# Patient Record
Sex: FEMALE | Race: WHITE | URBAN - METROPOLITAN AREA
[De-identification: names, ages, dates, MRNs, and addresses within clinical notes are randomized per-mention and may not be internally consistent; named-entity substitution may affect disease eponyms.]

---

## 2020-01-06 ENCOUNTER — INPATIENT (INPATIENT)
Facility: HOSPITAL | Age: 85
LOS: 1 days | Discharge: HOME | DRG: 864 | End: 2020-01-08
Attending: INTERNAL MEDICINE | Admitting: HOSPITALIST
Payer: MEDICARE

## 2020-01-06 VITALS
RESPIRATION RATE: 16 BRPM | HEART RATE: 98 BPM | OXYGEN SATURATION: 100 % | WEIGHT: 119.93 LBS | DIASTOLIC BLOOD PRESSURE: 54 MMHG | TEMPERATURE: 99 F | SYSTOLIC BLOOD PRESSURE: 139 MMHG | HEIGHT: 60 IN

## 2020-01-06 DIAGNOSIS — Z98.890 OTHER SPECIFIED POSTPROCEDURAL STATES: Chronic | ICD-10-CM

## 2020-01-06 DIAGNOSIS — R50.9 FEVER, UNSPECIFIED: ICD-10-CM

## 2020-01-06 LAB
ALBUMIN SERPL ELPH-MCNC: 3.6 G/DL — SIGNIFICANT CHANGE UP (ref 3.3–5)
ALP SERPL-CCNC: 43 U/L — SIGNIFICANT CHANGE UP (ref 40–120)
ALT FLD-CCNC: 26 U/L — SIGNIFICANT CHANGE UP (ref 12–78)
ANION GAP SERPL CALC-SCNC: 5 MMOL/L — SIGNIFICANT CHANGE UP (ref 5–17)
APTT BLD: 33.9 SEC — SIGNIFICANT CHANGE UP (ref 27.5–36.3)
AST SERPL-CCNC: 18 U/L — SIGNIFICANT CHANGE UP (ref 15–37)
BASOPHILS # BLD AUTO: 0.09 K/UL — SIGNIFICANT CHANGE UP (ref 0–0.2)
BASOPHILS NFR BLD AUTO: 2 % — SIGNIFICANT CHANGE UP (ref 0–2)
BILIRUB SERPL-MCNC: 0.2 MG/DL — SIGNIFICANT CHANGE UP (ref 0.2–1.2)
BUN SERPL-MCNC: 20 MG/DL — SIGNIFICANT CHANGE UP (ref 7–23)
CALCIUM SERPL-MCNC: 9 MG/DL — SIGNIFICANT CHANGE UP (ref 8.5–10.1)
CHLORIDE SERPL-SCNC: 109 MMOL/L — HIGH (ref 96–108)
CO2 SERPL-SCNC: 26 MMOL/L — SIGNIFICANT CHANGE UP (ref 22–31)
CREAT SERPL-MCNC: 0.67 MG/DL — SIGNIFICANT CHANGE UP (ref 0.5–1.3)
EOSINOPHIL # BLD AUTO: 0.04 K/UL — SIGNIFICANT CHANGE UP (ref 0–0.5)
EOSINOPHIL NFR BLD AUTO: 1 % — SIGNIFICANT CHANGE UP (ref 0–6)
GLUCOSE SERPL-MCNC: 129 MG/DL — HIGH (ref 70–99)
HCT VFR BLD CALC: 26.3 % — LOW (ref 34.5–45)
HGB BLD-MCNC: 9 G/DL — LOW (ref 11.5–15.5)
INR BLD: 1.15 RATIO — SIGNIFICANT CHANGE UP (ref 0.88–1.16)
LACTATE SERPL-SCNC: 2.2 MMOL/L — HIGH (ref 0.7–2)
LYMPHOCYTES # BLD AUTO: 0.74 K/UL — LOW (ref 1–3.3)
LYMPHOCYTES # BLD AUTO: 17 % — SIGNIFICANT CHANGE UP (ref 13–44)
MCHC RBC-ENTMCNC: 34.2 GM/DL — SIGNIFICANT CHANGE UP (ref 32–36)
MCHC RBC-ENTMCNC: 37.3 PG — HIGH (ref 27–34)
MCV RBC AUTO: 109.1 FL — HIGH (ref 80–100)
MONOCYTES # BLD AUTO: 0.43 K/UL — SIGNIFICANT CHANGE UP (ref 0–0.9)
MONOCYTES NFR BLD AUTO: 10 % — SIGNIFICANT CHANGE UP (ref 2–14)
NEUTROPHILS # BLD AUTO: 2.81 K/UL — SIGNIFICANT CHANGE UP (ref 1.8–7.4)
NEUTROPHILS NFR BLD AUTO: 64 % — SIGNIFICANT CHANGE UP (ref 43–77)
NRBC # BLD: SIGNIFICANT CHANGE UP /100 WBCS (ref 0–0)
PLATELET # BLD AUTO: 465 K/UL — HIGH (ref 150–400)
POTASSIUM SERPL-MCNC: 3.7 MMOL/L — SIGNIFICANT CHANGE UP (ref 3.5–5.3)
POTASSIUM SERPL-SCNC: 3.7 MMOL/L — SIGNIFICANT CHANGE UP (ref 3.5–5.3)
PROT SERPL-MCNC: 7.2 GM/DL — SIGNIFICANT CHANGE UP (ref 6–8.3)
PROTHROM AB SERPL-ACNC: 12.8 SEC — SIGNIFICANT CHANGE UP (ref 10–12.9)
RAPID RVP RESULT: SIGNIFICANT CHANGE UP
RBC # BLD: 2.41 M/UL — LOW (ref 3.8–5.2)
RBC # FLD: 14.4 % — SIGNIFICANT CHANGE UP (ref 10.3–14.5)
SODIUM SERPL-SCNC: 140 MMOL/L — SIGNIFICANT CHANGE UP (ref 135–145)
WBC # BLD: 4.33 K/UL — SIGNIFICANT CHANGE UP (ref 3.8–10.5)
WBC # FLD AUTO: 4.33 K/UL — SIGNIFICANT CHANGE UP (ref 3.8–10.5)

## 2020-01-06 PROCEDURE — 83036 HEMOGLOBIN GLYCOSYLATED A1C: CPT

## 2020-01-06 PROCEDURE — 83605 ASSAY OF LACTIC ACID: CPT

## 2020-01-06 PROCEDURE — 81003 URINALYSIS AUTO W/O SCOPE: CPT

## 2020-01-06 PROCEDURE — 85018 HEMOGLOBIN: CPT

## 2020-01-06 PROCEDURE — 84100 ASSAY OF PHOSPHORUS: CPT

## 2020-01-06 PROCEDURE — 71045 X-RAY EXAM CHEST 1 VIEW: CPT | Mod: 26

## 2020-01-06 PROCEDURE — 93010 ELECTROCARDIOGRAM REPORT: CPT

## 2020-01-06 PROCEDURE — G0008: CPT

## 2020-01-06 PROCEDURE — 87086 URINE CULTURE/COLONY COUNT: CPT

## 2020-01-06 PROCEDURE — 86900 BLOOD TYPING SEROLOGIC ABO: CPT

## 2020-01-06 PROCEDURE — 80048 BASIC METABOLIC PNL TOTAL CA: CPT

## 2020-01-06 PROCEDURE — 36430 TRANSFUSION BLD/BLD COMPNT: CPT

## 2020-01-06 PROCEDURE — 86920 COMPATIBILITY TEST SPIN: CPT

## 2020-01-06 PROCEDURE — 83735 ASSAY OF MAGNESIUM: CPT

## 2020-01-06 PROCEDURE — 36415 COLL VENOUS BLD VENIPUNCTURE: CPT

## 2020-01-06 PROCEDURE — P9016: CPT

## 2020-01-06 PROCEDURE — 86901 BLOOD TYPING SEROLOGIC RH(D): CPT

## 2020-01-06 PROCEDURE — 85014 HEMATOCRIT: CPT

## 2020-01-06 PROCEDURE — 85027 COMPLETE CBC AUTOMATED: CPT

## 2020-01-06 PROCEDURE — 85025 COMPLETE CBC W/AUTO DIFF WBC: CPT

## 2020-01-06 PROCEDURE — 86850 RBC ANTIBODY SCREEN: CPT

## 2020-01-06 PROCEDURE — 90686 IIV4 VACC NO PRSV 0.5 ML IM: CPT

## 2020-01-06 RX ORDER — SODIUM CHLORIDE 9 MG/ML
1700 INJECTION, SOLUTION INTRAVENOUS ONCE
Refills: 0 | Status: COMPLETED | OUTPATIENT
Start: 2020-01-06 | End: 2020-01-06

## 2020-01-06 RX ORDER — ACETAMINOPHEN 500 MG
650 TABLET ORAL EVERY 6 HOURS
Refills: 0 | Status: DISCONTINUED | OUTPATIENT
Start: 2020-01-06 | End: 2020-01-08

## 2020-01-06 RX ORDER — ASPIRIN/CALCIUM CARB/MAGNESIUM 324 MG
81 TABLET ORAL DAILY
Refills: 0 | Status: DISCONTINUED | OUTPATIENT
Start: 2020-01-06 | End: 2020-01-08

## 2020-01-06 RX ORDER — ATORVASTATIN CALCIUM 80 MG/1
80 TABLET, FILM COATED ORAL AT BEDTIME
Refills: 0 | Status: DISCONTINUED | OUTPATIENT
Start: 2020-01-06 | End: 2020-01-08

## 2020-01-06 RX ORDER — VANCOMYCIN HCL 1 G
1000 VIAL (EA) INTRAVENOUS ONCE
Refills: 0 | Status: COMPLETED | OUTPATIENT
Start: 2020-01-06 | End: 2020-01-06

## 2020-01-06 RX ORDER — PIPERACILLIN AND TAZOBACTAM 4; .5 G/20ML; G/20ML
3.38 INJECTION, POWDER, LYOPHILIZED, FOR SOLUTION INTRAVENOUS ONCE
Refills: 0 | Status: COMPLETED | OUTPATIENT
Start: 2020-01-06 | End: 2020-01-06

## 2020-01-06 RX ORDER — LEVOTHYROXINE SODIUM 125 MCG
50 TABLET ORAL DAILY
Refills: 0 | Status: DISCONTINUED | OUTPATIENT
Start: 2020-01-06 | End: 2020-01-08

## 2020-01-06 RX ORDER — LOSARTAN POTASSIUM 100 MG/1
50 TABLET, FILM COATED ORAL DAILY
Refills: 0 | Status: DISCONTINUED | OUTPATIENT
Start: 2020-01-06 | End: 2020-01-08

## 2020-01-06 RX ORDER — SERTRALINE 25 MG/1
100 TABLET, FILM COATED ORAL DAILY
Refills: 0 | Status: DISCONTINUED | OUTPATIENT
Start: 2020-01-06 | End: 2020-01-08

## 2020-01-06 RX ORDER — SODIUM CHLORIDE 9 MG/ML
1000 INJECTION, SOLUTION INTRAVENOUS ONCE
Refills: 0 | Status: COMPLETED | OUTPATIENT
Start: 2020-01-06 | End: 2020-01-06

## 2020-01-06 RX ADMIN — PIPERACILLIN AND TAZOBACTAM 3.38 GRAM(S): 4; .5 INJECTION, POWDER, LYOPHILIZED, FOR SOLUTION INTRAVENOUS at 18:40

## 2020-01-06 RX ADMIN — SODIUM CHLORIDE 1000 MILLILITER(S): 9 INJECTION, SOLUTION INTRAVENOUS at 22:32

## 2020-01-06 RX ADMIN — SODIUM CHLORIDE 1700 MILLILITER(S): 9 INJECTION, SOLUTION INTRAVENOUS at 18:08

## 2020-01-06 RX ADMIN — PIPERACILLIN AND TAZOBACTAM 200 GRAM(S): 4; .5 INJECTION, POWDER, LYOPHILIZED, FOR SOLUTION INTRAVENOUS at 18:08

## 2020-01-06 RX ADMIN — Medication 650 MILLIGRAM(S): at 22:33

## 2020-01-06 RX ADMIN — Medication 250 MILLIGRAM(S): at 22:32

## 2020-01-06 NOTE — H&P ADULT - ASSESSMENT
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   -Vitamin B12 and Folate     #Thrombocytosis   -?reactive vs secondary to hematological malignancy   -likely reactive/acute phase reactant given fever/sepsis   -continue to trend     #Hyperglycemia   -No Hx of DM  -HbA1c in AM   -will start on ISS based on results     #DVT Prophylaxis   Heparin SubQ    IMPROVE VTE Individual Risk Assessment    RISK                                                                Points    [  ] Previous VTE                                                  3    [  ] Thrombophilia                                               2    [  ] Lower limb paralysis                                      2        (unable to hold up >15 seconds)      [ x ] Current Cancer                                              2         (within 6 months)    [  ] Immobilization > 24 hrs                                1    [  ] ICU/CCU stay > 24 hours                              1    [x  ] Age > 60                                                      1    IMPROVE VTE Score: 3      #DIET: DASH diet       CODE STATUS: 85 y/o female with a PMHx of anemia, 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    Admit: Med/Surgery     #Febrile Syndrome complicated by sepsis in an immunocompromised host   Lactate 2.2-->2.1  s/p 2.7L IVF in ED   s/p Vancomycin and Cefepime   RVP negative   CXR   UA, Urine culture if positive   Blood cultures x 2   Tylenol 650 PRN for fever   monitor vitals   fall precautions     #Acute Promyelocytic Leukemia   -outpatient management   -s/p Chemotherapy  -monitor CBC     #Macrocytic Anemia  -likely secondary to hematological malignancy   -Vitamin B12 and Folate     #Thrombocytosis   -?reactive vs secondary to hematological malignancy   -likely reactive/acute phase reactant given fever/sepsis   -continue to trend     #Hyperglycemia   -No Hx of DM  -HbA1c in AM   -will start on ISS based on results     #DVT Prophylaxis   Heparin SubQ    IMPROVE VTE Individual Risk Assessment    RISK                                                                Points    [  ] Previous VTE                                                  3    [  ] Thrombophilia                                               2    [  ] Lower limb paralysis                                      2        (unable to hold up >15 seconds)      [ x ] Current Cancer                                              2         (within 6 months)    [  ] Immobilization > 24 hrs                                1    [  ] ICU/CCU stay > 24 hours                              1    [x  ] Age > 60                                                      1    IMPROVE VTE Score: 3      #DIET: DASH diet       CODE STATUS: 83 y/o female with a PMHx of anemia, 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    Admit: Med/Surgery     #Febrile Syndrome complicated by sepsis in an immunocompromised host   Lactate 2.2-->2.1  s/p 2.7L IVF in ED   s/p Vancomycin and Cefepime   RVP negative   CXR   UA, Urine culture if positive   Blood cultures x 2   Tylenol 650 PRN for fever   monitor vitals   fall precautions     #Acute Promyelocytic Leukemia   -outpatient management   -s/p Chemotherapy  -monitor CBC     #Macrocytic Anemia  -likely secondary to hematological malignancy   -Vitamin B12 and Folate     #Thrombocytosis   -?reactive vs secondary to hematological malignancy   -likely reactive/acute phase reactant given fever/sepsis   -continue to trend     #Hyperglycemia   -No Hx of DM  -HbA1c in AM   -will start on ISS based on results     #HLD   therapeutic exchange: Lipitor 80mg for Crestor 20mg     #HTN  c/w Losartan     #Hypothyroidism  c/w Synthroid     #Anxiety/Depression  c/w Zoloft 100mg     #DVT Prophylaxis   Heparin SubQ    IMPROVE VTE Individual Risk Assessment    RISK                                                                Points    [  ] Previous VTE                                                  3    [  ] Thrombophilia                                               2    [  ] Lower limb paralysis                                      2        (unable to hold up >15 seconds)      [ x ] Current Cancer                                              2         (within 6 months)    [  ] Immobilization > 24 hrs                                1    [  ] ICU/CCU stay > 24 hours                              1    [x  ] Age > 60                                                      1    IMPROVE VTE Score: 3      #DIET: DASH diet       CODE STATUS: 85 y/o female with a PMHx of anemia, 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   -trend, outpatient workup    #Thrombocytosis   -?reactive vs secondary to hematological malignancy   -likely reactive/acute phase reactant given fever/sepsis   -continue to trend     #Hyperglycemia   -No Hx of DM      #HLD   therapeutic exchange: Lipitor 80mg for Crestor 20mg     #HTN  c/w Losartan     #Hypothyroidism  c/w Synthroid     #Anxiety/Depression  c/w Zoloft 100mg     #DVT Prophylaxis   Lovenox    IMPROVE VTE Individual Risk Assessment    RISK                                                                Points    [  ] Previous VTE                                                  3    [  ] Thrombophilia                                               2    [  ] Lower limb paralysis                                      2        (unable to hold up >15 seconds)      [ x ] Current Cancer                                              2         (within 6 months)    [  ] Immobilization > 24 hrs                                1    [  ] ICU/CCU stay > 24 hours                              1    [x  ] Age > 60                                                      1    IMPROVE VTE Score: 3      #DIET: DASH diet 83 y/o female with a PMHx of anemia, 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    Admit: Med/Surgery     #Febrile Syndrome complicated by sepsis in an immunocompromised host, Viral illness vs HAP   Lactate 2.2-->2.1  s/p 2.7L IVF in ED   s/p Vancomycin and Cefepime, will continue   ID consult  RVP negative   CXR   UA, Urine culture if positive   Blood cultures x 2   Tylenol 650 PRN for fever   monitor vitals   fall precautions     #Acute Promyelocytic Leukemia   -outpatient management   -s/p Chemotherapy  -monitor CBC     #Macrocytic Anemia  -likely secondary to hematological malignancy   -trend, outpatient workup    #Thrombocytosis   -?reactive vs secondary to hematological malignancy   -likely reactive/acute phase reactant given fever/sepsis   -continue to trend     #Hyperglycemia   -No Hx of DM      #HLD   therapeutic exchange: Lipitor 80mg for Crestor 20mg     #HTN  c/w Losartan     #Hypothyroidism  c/w Synthroid     #Anxiety/Depression  c/w Zoloft 100mg     #DVT Prophylaxis   Lovenox    IMPROVE VTE Individual Risk Assessment    RISK                                                                Points    [  ] Previous VTE                                                  3    [  ] Thrombophilia                                               2    [  ] Lower limb paralysis                                      2        (unable to hold up >15 seconds)      [ x ] Current Cancer                                              2         (within 6 months)    [  ] Immobilization > 24 hrs                                1    [  ] ICU/CCU stay > 24 hours                              1    [x  ] Age > 60                                                      1    IMPROVE VTE Score: 3      #DIET: DASH diet

## 2020-01-06 NOTE — ED ADULT NURSE NOTE - OBJECTIVE STATEMENT
pt presents to ed ambulatory from dr corbin office for fever/chills. pt received chemo last week. pt is pt of [facility]. pt alert and oriented x4, rosa 4 , vitals stable. pt states tempt of 101 oral at home, afebrile orally in ed. bp normotensive. pt in no distress. ekg done, cardiac monitor

## 2020-01-06 NOTE — H&P ADULT - HISTORY OF PRESENT ILLNESS
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 Pt states she noticed that she had a temperature of 100.8 this morning. States she went to get evaluated at her Hemotologist/Oncologist Dr. Forte, where she was found to have temperature 101.7 and low WBC when the labs were drawn. Patient was sent to ED by her Heme/Onc for further evaluation. Patient reports of clear rhinorrhea, otherwise denies sore throat, cough, congestion, sinus tenderness, chest pain, palpitations shortness of breath, abdominal pain, nausea, vomiting, diarrhea, constipation, urinary frequency, urgency or dysuria. Patient denies recent travel. No sick contacts. No Hx of DVT/PE  Patient reports of receiving 1st round of chemotherapy about 2 weeks ago and reports of chemotherapy associated fatigue which has since resolved. Patient is due for chemotherapy 01/13/2020 for   Off note: Pt states she has a stray cat at home and feeds raccoons and birds. 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 Pt states she noticed that she had a temperature of 100.8 this morning. States she went to get evaluated at her Hemotologist/Oncologist Dr. Forte, where she was found to have temperature 101.7 and low WBC when the labs were drawn. Patient was sent to ED by her Heme/Onc for further evaluation. Patient reports of clear rhinorrhea, otherwise denies sore throat, cough, congestion, sinus tenderness, chest pain, palpitations shortness of breath, abdominal pain, nausea, vomiting, diarrhea, constipation, urinary frequency, urgency or dysuria. Patient denies recent travel. No sick contacts. No Hx of DVT/PE  Patient reports of receiving 1st round of chemotherapy about 2 weeks ago and reports of chemotherapy associated fatigue which has since resolved. Patient is due for chemotherapy 01/13/2020   Off note: Pt states she has a stray cat at home and feeds raccoons and birds. Denies any bites/trauma

## 2020-01-06 NOTE — ED PROVIDER NOTE - CARE PLAN
Principal Discharge DX:	Fever, unspecified fever cause  Secondary Diagnosis:	Leukemia in relapse, unspecified leukemia type

## 2020-01-06 NOTE — H&P ADULT - ATTENDING COMMENTS
Patient seen and examined after initial evaluation above by family medicine resident. Case discussed and reviewed in detail. Please note my plan below.     85 y/o F with PMH of anemia, leukemia on chemo, HTN, dyslipidemia, hypothyroidism, p/w fever.     *Severe sepsis (Fever at home + HR >90) - Possible HCAP (suspect gram negative source) vs viral infection   -RVP negative  -Lactic acidosis improving 2.2 -> 2.1  -IVF   -F/u blood culture  -Vanco / cefepime  -F/u final CXR report  -ID consult     *Leukemia on chemo  -F/u outpatient w/ heme/onc     *Anemia / Thrombocytosis   -Trend and if stable -> f/u outpatient for further management     *HTN / dyslipidemia / hypothyroidism  -C/w home meds and f/u outpatient for further management     *DVT ppx  -Lovenox

## 2020-01-06 NOTE — H&P ADULT - NSHPREVIEWOFSYSTEMS_GEN_ALL_CORE
Review of Symptoms  Gen: +fevers, +chills, sweats, weight loss, fatigue  Visual: no recent changes in vision, no blurriness, no seeing spots  Cardiovascular: no chest pain, no palpitations, no orthopnea, no leg swelling  Respiratory: no shortness of breath, no exertional dyspnea, no cough, +rhinorrhea, no nasal congestion  GI: no difficulty swallowing, no nausea, no vomiting, no abdominal pain, no diarrhea, no constipation, no melana  : no dysuria, no increased freq, no hematuria, no malodorous urine  Derm: no wounds, no rashes  Heme: no easy bleeding or bruising  MSK: no joint pain, no joint swelling or redness, no extremity pain   Neuro: no headache, no numbness, no weakness, no memory loss

## 2020-01-06 NOTE — ED PROVIDER NOTE - OBJECTIVE STATEMENT
83 y/o female with a PMHx of anemia presents to ED c/o fever tmax 104 at MD office. Pt sent in by Dr. Arreaga for high fever and low WBC. Currently on chemo, last chemo x2 weeks ago, due on the 01/13/2020 for second for leukemia. Denies SOB, cough, n/v/d. +fever. +chills. Pt did not take any medications PTA. NKDA. 85 y/o female with a PMHx of anemia, leukemia presents to ED c/o fever tmax 102 at MD office. Pt sent in by Dr. Arreaga for high fever and low WBC. Currently on chemo, last chemo x2 weeks ago, due on the 01/13/2020 for second for leukemia. Denies SOB, cough, n/v/d. +fever. +chills. Pt did not take any medications PTA. NKDA.

## 2020-01-06 NOTE — H&P ADULT - NSHPSOCIALHISTORY_GEN_ALL_CORE
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

## 2020-01-06 NOTE — ED PROVIDER NOTE - PROGRESS NOTE DETAILS
85 yo female with PMHx: leukemia and anemia presented to ed with c/c of fever. spoke to pt oncologist recommend staying for further workup pt agrees with plan. -Josie Hanson PA-C spoke to pt and son they agree for admission as per her oncologist. spoke to hospitalist for admission. -Josie Hanson PA-C

## 2020-01-06 NOTE — ED ADULT NURSE NOTE - CHIEF COMPLAINT
Patient sleeping in room, respirations regular, even, and unlabored. Sitter in position for direct obs. Patient has no complaints at this time.      The patient is a 84y Female complaining of fever.

## 2020-01-06 NOTE — ED ADULT NURSE NOTE - NSIMPLEMENTINTERV_GEN_ALL_ED
Implemented All Universal Safety Interventions:  [place] to call system. Call bell, personal items and telephone within reach. Instruct patient to call for assistance. Room bathroom lighting operational. Non-slip footwear when patient is off stretcher. Physically safe environment: no spills, clutter or unnecessary equipment. Stretcher in lowest position, wheels locked, appropriate side rails in place.

## 2020-01-06 NOTE — ED PROVIDER NOTE - NS_ ATTENDINGSCRIBEDETAILS _ED_A_ED_FT
I, Brian Mclean MD,  performed the initial face to face bedside interview with this patient regarding history of present illness, review of symptoms and relevant past medical, social and family history.  I completed an independent physical examination.    The history, relevant review of systems, past medical and surgical history, medical decision making, and physical examination was documented by the scribe in my presence and I attest to the accuracy of the documentation.

## 2020-01-06 NOTE — H&P ADULT - NSICDXFAMILYHX_GEN_ALL_CORE_FT
FAMILY HISTORY:  Family history of cancer in brother, Colon cancer  Patient's father is , Hx of spine cancer  Patient's mother is , Hx of breast and bone cancer

## 2020-01-06 NOTE — H&P ADULT - NSICDXPASTMEDICALHX_GEN_ALL_CORE_FT
PAST MEDICAL HISTORY:  Acute promyelocytic leukemia     Anemia     HLD (hyperlipidemia)     HTN (hypertension)     Hypothyroidism

## 2020-01-06 NOTE — PROVIDER CONTACT NOTE (CRITICAL VALUE NOTIFICATION) - NS PROVIDER READ BACK
Skin: Skin is warm and dry. No rash noted. Psychiatric: She has a normal mood and affect. Vitals reviewed. ASSESSMENT/PLAN:  1. Bacterial upper respiratory infection  Continue cough suppressant prn, mucinex  Hydration  Take augmentin with food or milk   - amoxicillin-clavulanate (AUGMENTIN) 500-125 MG per tablet; Take 1 tablet by mouth 2 times daily for 7 days  Dispense: 14 tablet; Refill: 0         Return for already scheduled. yes

## 2020-01-07 LAB
ANION GAP SERPL CALC-SCNC: 5 MMOL/L — SIGNIFICANT CHANGE UP (ref 5–17)
APPEARANCE UR: CLEAR — SIGNIFICANT CHANGE UP
BASOPHILS # BLD AUTO: 0.06 K/UL — SIGNIFICANT CHANGE UP (ref 0–0.2)
BASOPHILS NFR BLD AUTO: 2 % — SIGNIFICANT CHANGE UP (ref 0–2)
BILIRUB UR-MCNC: NEGATIVE — SIGNIFICANT CHANGE UP
BUN SERPL-MCNC: 15 MG/DL — SIGNIFICANT CHANGE UP (ref 7–23)
CALCIUM SERPL-MCNC: 8.1 MG/DL — LOW (ref 8.5–10.1)
CHLORIDE SERPL-SCNC: 112 MMOL/L — HIGH (ref 96–108)
CO2 SERPL-SCNC: 24 MMOL/L — SIGNIFICANT CHANGE UP (ref 22–31)
COLOR SPEC: YELLOW — SIGNIFICANT CHANGE UP
CREAT SERPL-MCNC: 0.66 MG/DL — SIGNIFICANT CHANGE UP (ref 0.5–1.3)
DIFF PNL FLD: NEGATIVE — SIGNIFICANT CHANGE UP
EOSINOPHIL # BLD AUTO: 0.11 K/UL — SIGNIFICANT CHANGE UP (ref 0–0.5)
EOSINOPHIL NFR BLD AUTO: 4 % — SIGNIFICANT CHANGE UP (ref 0–6)
GLUCOSE SERPL-MCNC: 132 MG/DL — HIGH (ref 70–99)
GLUCOSE UR QL: 50 MG/DL
HBA1C BLD-MCNC: 7.7 % — HIGH (ref 4–5.6)
HCT VFR BLD CALC: 22.3 % — LOW (ref 34.5–45)
HCT VFR BLD CALC: 22.6 % — LOW (ref 34.5–45)
HCT VFR BLD CALC: 22.6 % — LOW (ref 34.5–45)
HGB BLD-MCNC: 7.3 G/DL — LOW (ref 11.5–15.5)
HGB BLD-MCNC: 7.3 G/DL — LOW (ref 11.5–15.5)
HGB BLD-MCNC: 7.5 G/DL — LOW (ref 11.5–15.5)
KETONES UR-MCNC: NEGATIVE — SIGNIFICANT CHANGE UP
LACTATE SERPL-SCNC: 1.4 MMOL/L — SIGNIFICANT CHANGE UP (ref 0.7–2)
LEUKOCYTE ESTERASE UR-ACNC: NEGATIVE — SIGNIFICANT CHANGE UP
LYMPHOCYTES # BLD AUTO: 0.69 K/UL — LOW (ref 1–3.3)
LYMPHOCYTES # BLD AUTO: 24 % — SIGNIFICANT CHANGE UP (ref 13–44)
MCHC RBC-ENTMCNC: 33.2 GM/DL — SIGNIFICANT CHANGE UP (ref 32–36)
MCHC RBC-ENTMCNC: 36.6 PG — HIGH (ref 27–34)
MCV RBC AUTO: 110.2 FL — HIGH (ref 80–100)
MONOCYTES # BLD AUTO: 0.31 K/UL — SIGNIFICANT CHANGE UP (ref 0–0.9)
MONOCYTES NFR BLD AUTO: 11 % — SIGNIFICANT CHANGE UP (ref 2–14)
NEUTROPHILS # BLD AUTO: 1.69 K/UL — LOW (ref 1.8–7.4)
NEUTROPHILS NFR BLD AUTO: 56 % — SIGNIFICANT CHANGE UP (ref 43–77)
NITRITE UR-MCNC: NEGATIVE — SIGNIFICANT CHANGE UP
NRBC # BLD: SIGNIFICANT CHANGE UP /100 WBCS (ref 0–0)
PH UR: 8 — SIGNIFICANT CHANGE UP (ref 5–8)
PLATELET # BLD AUTO: 351 K/UL — SIGNIFICANT CHANGE UP (ref 150–400)
POTASSIUM SERPL-MCNC: 3.7 MMOL/L — SIGNIFICANT CHANGE UP (ref 3.5–5.3)
POTASSIUM SERPL-SCNC: 3.7 MMOL/L — SIGNIFICANT CHANGE UP (ref 3.5–5.3)
PROT UR-MCNC: NEGATIVE MG/DL — SIGNIFICANT CHANGE UP
RBC # BLD: 2.05 M/UL — LOW (ref 3.8–5.2)
RBC # FLD: 14.8 % — HIGH (ref 10.3–14.5)
SODIUM SERPL-SCNC: 141 MMOL/L — SIGNIFICANT CHANGE UP (ref 135–145)
SP GR SPEC: 1.01 — SIGNIFICANT CHANGE UP (ref 1.01–1.02)
UROBILINOGEN FLD QL: NEGATIVE MG/DL — SIGNIFICANT CHANGE UP
WBC # BLD: 2.86 K/UL — LOW (ref 3.8–10.5)
WBC # FLD AUTO: 2.86 K/UL — LOW (ref 3.8–10.5)

## 2020-01-07 RX ORDER — CEFEPIME 1 G/1
2000 INJECTION, POWDER, FOR SOLUTION INTRAMUSCULAR; INTRAVENOUS EVERY 12 HOURS
Refills: 0 | Status: COMPLETED | OUTPATIENT
Start: 2020-01-07 | End: 2020-01-08

## 2020-01-07 RX ORDER — CEFEPIME 1 G/1
2000 INJECTION, POWDER, FOR SOLUTION INTRAMUSCULAR; INTRAVENOUS EVERY 12 HOURS
Refills: 0 | Status: DISCONTINUED | OUTPATIENT
Start: 2020-01-07 | End: 2020-01-07

## 2020-01-07 RX ORDER — ENOXAPARIN SODIUM 100 MG/ML
40 INJECTION SUBCUTANEOUS ONCE
Refills: 0 | Status: DISCONTINUED | OUTPATIENT
Start: 2020-01-07 | End: 2020-01-07

## 2020-01-07 RX ORDER — VANCOMYCIN HCL 1 G
1000 VIAL (EA) INTRAVENOUS EVERY 12 HOURS
Refills: 0 | Status: DISCONTINUED | OUTPATIENT
Start: 2020-01-07 | End: 2020-01-07

## 2020-01-07 RX ORDER — CEFEPIME 1 G/1
1000 INJECTION, POWDER, FOR SOLUTION INTRAMUSCULAR; INTRAVENOUS EVERY 12 HOURS
Refills: 0 | Status: DISCONTINUED | OUTPATIENT
Start: 2020-01-07 | End: 2020-01-07

## 2020-01-07 RX ORDER — PIPERACILLIN AND TAZOBACTAM 4; .5 G/20ML; G/20ML
3.38 INJECTION, POWDER, LYOPHILIZED, FOR SOLUTION INTRAVENOUS EVERY 8 HOURS
Refills: 0 | Status: DISCONTINUED | OUTPATIENT
Start: 2020-01-07 | End: 2020-01-07

## 2020-01-07 RX ORDER — INFLUENZA VIRUS VACCINE 15; 15; 15; 15 UG/.5ML; UG/.5ML; UG/.5ML; UG/.5ML
0.5 SUSPENSION INTRAMUSCULAR ONCE
Refills: 0 | Status: COMPLETED | OUTPATIENT
Start: 2020-01-07 | End: 2020-01-08

## 2020-01-07 RX ORDER — SODIUM CHLORIDE 9 MG/ML
250 INJECTION INTRAMUSCULAR; INTRAVENOUS; SUBCUTANEOUS ONCE
Refills: 0 | Status: COMPLETED | OUTPATIENT
Start: 2020-01-07 | End: 2020-01-07

## 2020-01-07 RX ORDER — ENOXAPARIN SODIUM 100 MG/ML
40 INJECTION SUBCUTANEOUS DAILY
Refills: 0 | Status: DISCONTINUED | OUTPATIENT
Start: 2020-01-07 | End: 2020-01-08

## 2020-01-07 RX ADMIN — Medication 50 MICROGRAM(S): at 04:44

## 2020-01-07 RX ADMIN — CEFEPIME 100 MILLIGRAM(S): 1 INJECTION, POWDER, FOR SOLUTION INTRAMUSCULAR; INTRAVENOUS at 18:55

## 2020-01-07 RX ADMIN — ATORVASTATIN CALCIUM 80 MILLIGRAM(S): 80 TABLET, FILM COATED ORAL at 21:42

## 2020-01-07 RX ADMIN — CEFEPIME 1000 MILLIGRAM(S): 1 INJECTION, POWDER, FOR SOLUTION INTRAMUSCULAR; INTRAVENOUS at 06:12

## 2020-01-07 RX ADMIN — Medication 81 MILLIGRAM(S): at 12:57

## 2020-01-07 RX ADMIN — Medication 250 MILLIGRAM(S): at 04:56

## 2020-01-07 RX ADMIN — LOSARTAN POTASSIUM 50 MILLIGRAM(S): 100 TABLET, FILM COATED ORAL at 12:57

## 2020-01-07 RX ADMIN — SERTRALINE 100 MILLIGRAM(S): 25 TABLET, FILM COATED ORAL at 12:57

## 2020-01-07 RX ADMIN — ENOXAPARIN SODIUM 40 MILLIGRAM(S): 100 INJECTION SUBCUTANEOUS at 12:57

## 2020-01-07 NOTE — PROGRESS NOTE ADULT - SUBJECTIVE AND OBJECTIVE BOX
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 Pt states she noticed that she had a temperature of 100.8 this morning. States she went to get evaluated at her Hemotologist/Oncologist Dr. Forte, where she was found to have temperature 101.7 and low WBC when the labs were drawn. Patient was sent to ED by her Heme/Onc for further evaluation. Patient reports of clear rhinorrhea, otherwise denies sore throat, cough, congestion, sinus tenderness, chest pain, palpitations shortness of breath, abdominal pain, nausea, vomiting, diarrhea, constipation, urinary frequency, urgency or dysuria. Patient denies recent travel. No sick contacts. No Hx of DVT/PE  Patient reports of receiving 1st round of chemotherapy about 2 weeks ago and reports of chemotherapy associated fatigue which has since resolved. Patient is due for chemotherapy 2020   Off note: Pt states she has a stray cat at home and feeds raccoons and birds. Denies any bites/trauma (2020 22:23)    SUBJECTIVE:  Pt is evlauted bedside and found AAOx3 and in no acute distress. Pt report  one day ago have 2 episodes of fever of 100.8 and 101.7 respectively and was send from the office of her Hemato Onco for further evaluation. At arrival to ED vital signs stable w/o evidenc of fever. Lactate 2.4 and receive 1.9 L of IV fluids with improvment of lactated to 1.4. Chest X ray negative   MEDICATIONS  (STANDING):  aspirin enteric coated 81 milliGRAM(s) Oral daily  atorvastatin 80 milliGRAM(s) Oral at bedtime  cefepime  Injectable. 2000 milliGRAM(s) IV Push every 12 hours  enoxaparin Injectable 40 milliGRAM(s) SubCutaneous daily  influenza   Vaccine 0.5 milliLiter(s) IntraMuscular once  levothyroxine 50 MICROGram(s) Oral daily  losartan 50 milliGRAM(s) Oral daily  sertraline 100 milliGRAM(s) Oral daily  vancomycin  IVPB 1000 milliGRAM(s) IV Intermittent every 12 hours    MEDICATIONS  (PRN):  acetaminophen   Tablet .. 650 milliGRAM(s) Oral every 6 hours PRN Mild Pain (1 - 3)      Vital Signs Last 24 Hrs  T(C): 36.7 (2020 12:21), Max: 37.3 (2020 03:25)  T(F): 98 (2020 12:21), Max: 99.2 (2020 03:25)  HR: 76 (2020 12:21) (67 - 98)  BP: 105/42 (2020 12:21) (95/38 - 139/54)  BP(mean): --  RR: 18 (2020 12:21) (16 - 18)  SpO2: 98% (2020 12:21) (97% - 100%)    GEN: NAD, comfortable, resting in bed  HEENT: NC/AT, EOMI, PERRLA, MMM, clear conjunctiva and sclera, normal hearing, no nasal discharge, throat clear, no thrush, normal dentition.   NECK: supple, no JVD, no LAD, no thyromegaly  BACK:  ROM intact, no spinal/paraspinal tenderness  CV: S1S2, RRR, no mumur  RESP: good air movement, CTABL, no rales, rhonchi or wheezing, respirations unlabored  ABD: +BS, soft, ND, NT, no guarding, no rigidity, no HSM  EXT: +2 radial and pedal pulses, no edema, no calve tenderness  SKIN: No visible Rashes or Ulcers  MSK: ROM intact in all extremities  NEURO:  sensory and CN 2-12 Grossly intact, no motor deficits, no, saddle anesthesia, AOx3  PSYCH: normal behavior         LABS:                          7.5    2.86  )-----------( 351      ( 2020 07:39 )             22.6     2020 07:39    141    |  112    |  15     ----------------------------<  132    3.7     |  24     |  0.66     Ca    8.1        2020 07:39    TPro  7.2    /  Alb  3.6    /  TBili  0.2    /  DBili  x      /  AST  18     /  ALT  26     /  AlkPhos  43     2020 17:56    LIVER FUNCTIONS - ( 2020 17:56 )  Alb: 3.6 g/dL / Pro: 7.2 gm/dL / ALK PHOS: 43 U/L / ALT: 26 U/L / AST: 18 U/L / GGT: x           PT/INR - ( 2020 17:56 )   PT: 12.8 sec;   INR: 1.15 ratio         PTT - ( 2020 17:56 )  PTT:33.9 sec  CAPILLARY BLOOD GLUCOSE            Urinalysis Basic - ( 2020 04:45 )    Color: Yellow / Appearance: Clear / S.010 / pH: x  Gluc: x / Ketone: Negative  / Bili: Negative / Urobili: Negative mg/dL   Blood: x / Protein: Negative mg/dL / Nitrite: Negative   Leuk Esterase: Negative / RBC: x / WBC x   Sq Epi: x / Non Sq Epi: x / Bacteria: x        RADIOLOGY: HPI:  85 y/o female with a PMHx of anemia, 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 Pt states she noticed that she had a temperature of 100.8 this morning. States she went to get evaluated at her Hemotologist/Oncologist Dr. Forte, where she was found to have temperature 101.7 and low WBC when the labs were drawn. Patient was sent to ED by her Heme/Onc for further evaluation. Patient reports of clear rhinorrhea, otherwise denies sore throat, cough, congestion, sinus tenderness, chest pain, palpitations shortness of breath, abdominal pain, nausea, vomiting, diarrhea, constipation, urinary frequency, urgency or dysuria. Patient denies recent travel. No sick contacts. No Hx of DVT/PE  Patient reports of receiving 1st round of chemotherapy about 2 weeks ago and reports of chemotherapy associated fatigue which has since resolved. Patient is due for chemotherapy 2020   Off note: Pt states she has a stray cat at home and feeds raccoons and birds. Denies any bites/trauma (2020 22:23)    SUBJECTIVE:  Pt is evaluated at  [place] [id number] and in no acute distress. Pt report  one day ago have 2 episodes of fever of 100.8 and 101.7 respectively and was send from the office of her Hemato Onco for further evaluation. At arrival to ED vital signs stable w/o evidenc of fever. Lactate 2.4 and receive 1.9 L of IV fluids with improvment of lactated to 1.4. Chest X ray negative for infection or pleural effusion. Pt have remained afebrile since admission. By ID recommendations pt will continue in cefepime 2g every 12 hrs. Today she denies fever, chills, dizziness, nausea, vomits , SOB, chest pain, sore throat, dysuria and any other symptoms.    Review of Symptoms  Gen: no fevers, chills, sweats, weight loss, fatigue  Visual: no recent changes in vision, no blurriness, no seeing spots  Cardiovascular: no chest pain, no palpitations, no orthopnea, no leg swelling  Respiratory: no shortness of breath, no exertional dyspnea, no cough, no rhinorrhea, no nasal congestion  GI: no difficulty swallowing, no nausea, no vomiting, no abdominal pain, no diarrhea, no constipation, no melana  : no dysuria, no increased freq, no hematuria, no malodorous urine  Derm: no wounds, no rashes  Heme: no easy bleeding or bruising  MSK: no joint pain, no joint swelling or redness, no extremity pain   Neuro: no headache, no numbness, no weakness, no memory loss  Psych: no depression, no anxiety, no SI        MEDICATIONS  (STANDING):  aspirin enteric coated 81 milliGRAM(s) Oral daily  atorvastatin 80 milliGRAM(s) Oral at bedtime  cefepime  Injectable. 2000 milliGRAM(s) IV Push every 12 hours  enoxaparin Injectable 40 milliGRAM(s) SubCutaneous daily  influenza   Vaccine 0.5 milliLiter(s) IntraMuscular once  levothyroxine 50 MICROGram(s) Oral daily  losartan 50 milliGRAM(s) Oral daily  sertraline 100 milliGRAM(s) Oral daily  vancomycin  IVPB 1000 milliGRAM(s) IV Intermittent every 12 hours    MEDICATIONS  (PRN):  acetaminophen   Tablet .. 650 milliGRAM(s) Oral every 6 hours PRN Mild Pain (1 - 3)      Vital Signs Last 24 Hrs  T(C): 36.7 (2020 12:21), Max: 37.3 (2020 03:25)  T(F): 98 (2020 12:21), Max: 99.2 (2020 03:25)  HR: 76 (2020 12:21) (67 - 98)  BP: 105/42 (2020 12:21) (95/38 - 139/54)  BP(mean): --  RR: 18 (2020 12:21) (16 - 18)  SpO2: 98% (2020 12:21) (97% - 100%)    GEN: NAD, comfortable, resting in bed  HEENT: NC/AT, EOMI, PERRLA, MMM, clear conjunctiva and sclera, normal hearing, no nasal discharge, throat clear, no thrush, normal dentition.   NECK: supple, no JVD, no LAD, no thyromegaly  BACK:  ROM intact, no spinal/paraspinal tenderness  CV: S1S2, RRR, no mumur  RESP: good air movement, CTABL, no rales, rhonchi or wheezing, respirations unlabored  ABD: +BS, soft, ND, NT, no guarding, no rigidity, no HSM  EXT: +2 radial and pedal pulses, no edema, no calve tenderness  SKIN: No visible Rashes or Ulcers  MSK: ROM intact in all extremities  NEURO:  sensory and CN 2-12 Grossly intact, no motor deficits, no, saddle anesthesia, AOx3  PSYCH: normal behavior         LABS:                          7.5    2.86  )-----------( 351      ( 2020 07:39 )             22.6     2020 07:39    141    |  112    |  15     ----------------------------<  132    3.7     |  24     |  0.66     Ca    8.1        2020 07:39    TPro  7.2    /  Alb  3.6    /  TBili  0.2    /  DBili  x      /  AST  18     /  ALT  26     /  AlkPhos  43     2020 17:56    LIVER FUNCTIONS - ( 2020 17:56 )  Alb: 3.6 g/dL / Pro: 7.2 gm/dL / ALK PHOS: 43 U/L / ALT: 26 U/L / AST: 18 U/L / GGT: x           PT/INR - ( 2020 17:56 )   PT: 12.8 sec;   INR: 1.15 ratio         PTT - ( 2020 17:56 )  PTT:33.9 sec  CAPILLARY BLOOD GLUCOSE            Urinalysis Basic - ( 2020 04:45 )    Color: Yellow / Appearance: Clear / S.010 / pH: x  Gluc: x / Ketone: Negative  / Bili: Negative / Urobili: Negative mg/dL   Blood: x / Protein: Negative mg/dL / Nitrite: Negative   Leuk Esterase: Negative / RBC: x / WBC x   Sq Epi: x / Non Sq Epi: x / Bacteria: x        RADIOLOGY: HPI:  83 y/o female with a PMHx of anemia, 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 Pt states she noticed that she had a temperature of 100.8 this morning. States she went to get evaluated at her Hemotologist/Oncologist Dr. Forte, where she was found to have temperature 101.7 and low WBC when the labs were drawn. Patient was sent to ED by her Heme/Onc for further evaluation. Patient reports of clear rhinorrhea, otherwise denies sore throat, cough, congestion, sinus tenderness, chest pain, palpitations shortness of breath, abdominal pain, nausea, vomiting, diarrhea, constipation, urinary frequency, urgency or dysuria. Patient denies recent travel. No sick contacts. No Hx of DVT/PE  Patient reports of receiving 1st round of chemotherapy about 2 weeks ago and reports of chemotherapy associated fatigue which has since resolved. Patient is due for chemotherapy 2020   Off note: Pt states she has a stray cat at home and feeds raccoons and birds. Denies any bites/trauma (2020 22:23)    SUBJECTIVE:  Pt is evaluated bedside and found AAOx3 and in no acute distress. Pt reports one day ago have 2 episodes of fever of 100.8 and 101.7 respectively and was send from the office of her Hemato Onco for further evaluation. At arrival to ED vital signs stable w/o evidence of fever. Lactate 2.4 and received 2.7 L of IV fluids with improvement of lactate to 1.4. Chest X ray negative for infection or pleural effusion. Pt have remained afebrile since admission. By ID recommendations pt will continue in cefepime 2g every 12 hrs. Today she denies fever, chills, dizziness, nausea, vomits , SOB, chest pain, sore throat, dysuria and any other symptoms.    Review of Symptoms  Gen: no fevers, chills, sweats, weight loss, fatigue  Visual: no recent changes in vision, no blurriness, no seeing spots  Cardiovascular: no chest pain, no palpitations, no orthopnea, no leg swelling  Respiratory: no shortness of breath, no exertional dyspnea, no cough, no rhinorrhea, no nasal congestion  GI: no difficulty swallowing, no nausea, no vomiting, no abdominal pain, no diarrhea, no constipation, no melana  : no dysuria, no increased freq, no hematuria, no malodorous urine  Derm: no wounds, no rashes  Heme: no easy bleeding or bruising  MSK: no joint pain, no joint swelling or redness, no extremity pain   Neuro: no headache, no numbness, no weakness, no memory loss  Psych: no depression, no anxiety, no SI        MEDICATIONS  (STANDING):  aspirin enteric coated 81 milliGRAM(s) Oral daily  atorvastatin 80 milliGRAM(s) Oral at bedtime  cefepime  Injectable. 2000 milliGRAM(s) IV Push every 12 hours  enoxaparin Injectable 40 milliGRAM(s) SubCutaneous daily  influenza   Vaccine 0.5 milliLiter(s) IntraMuscular once  levothyroxine 50 MICROGram(s) Oral daily  losartan 50 milliGRAM(s) Oral daily  sertraline 100 milliGRAM(s) Oral daily  vancomycin  IVPB 1000 milliGRAM(s) IV Intermittent every 12 hours    MEDICATIONS  (PRN):  acetaminophen   Tablet .. 650 milliGRAM(s) Oral every 6 hours PRN Mild Pain (1 - 3)      Vital Signs Last 24 Hrs  T(C): 36.7 (2020 12:21), Max: 37.3 (2020 03:25)  T(F): 98 (2020 12:21), Max: 99.2 (2020 03:25)  HR: 76 (2020 12:21) (67 - 98)  BP: 105/42 (2020 12:21) (95/38 - 139/54)  BP(mean): --  RR: 18 (2020 12:21) (16 - 18)  SpO2: 98% (2020 12:21) (97% - 100%)    GEN: NAD, comfortable, resting in bed  HEENT: NC/AT, EOMI, PERRLA, MMM, clear conjunctiva and sclera, normal hearing, no nasal discharge, throat clear, no thrush, normal dentition.   NECK: supple, no JVD, no LAD, no thyromegaly  BACK:  ROM intact, no spinal/paraspinal tenderness  CHEST: R breast mastectomy scar noticed.   CV: S1S2, RRR, no mumur.   RESP: good air movement, CTABL, no rales, rhonchi or wheezing, respirations unlabored  ABD: +BS, soft, ND, NT, no guarding, no rigidity, no HSM  EXT: +2 radial and pedal pulses, no edema, no calve tenderness  SKIN: No visible Rashes or Ulcers  MSK: ROM intact in all extremities  NEURO:  sensory and CN 2-12 Grossly intact, no motor deficits, no, saddle anesthesia, AOx3  PSYCH: normal behavior         LABS:                          7.5    2.86  )-----------( 351      ( 2020 07:39 )             22.6     2020 07:39    141    |  112    |  15     ----------------------------<  132    3.7     |  24     |  0.66     Ca    8.1        2020 07:39    TPro  7.2    /  Alb  3.6    /  TBili  0.2    /  DBili  x      /  AST  18     /  ALT  26     /  AlkPhos  43     2020 17:56    LIVER FUNCTIONS - ( 2020 17:56 )  Alb: 3.6 g/dL / Pro: 7.2 gm/dL / ALK PHOS: 43 U/L / ALT: 26 U/L / AST: 18 U/L / GGT: x           PT/INR - ( 2020 17:56 )   PT: 12.8 sec;   INR: 1.15 ratio         PTT - ( 2020 17:56 )  PTT:33.9 sec  CAPILLARY BLOOD GLUCOSE            Urinalysis Basic - ( 2020 04:45 )    Color: Yellow / Appearance: Clear / S.010 / pH: x  Gluc: x / Ketone: Negative  / Bili: Negative / Urobili: Negative mg/dL   Blood: x / Protein: Negative mg/dL / Nitrite: Negative   Leuk Esterase: Negative / RBC: x / WBC x   Sq Epi: x / Non Sq Epi: x / Bacteria: x        RADIOLOGY:  < from: Xray Chest 1 View-PORTABLE IMMEDIATE (20 @ 18:26) >  PROCEDURE DATE:  2020          INTERPRETATION:  Clinical indication:  Sepsis    Technique: XR CHEST IMMEDIATE portable AP    Comparison:None    Findings:  Lines: None    Heart/Mediastinum/Lungs/Other: The heart size is normal.The lungs are clear. Increased opacity over the left mid to lower chest is likely related to the presence of a breast prosthesis. There are multiple surgical clips in the left axilla. There are no pleural effusions.    Impression:    As above.    < end of copied text >

## 2020-01-07 NOTE — PROGRESS NOTE ADULT - ASSESSMENT
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    #Febrile Syndrome complicated by sepsis in an immunocompromised host  - Mostly due to viral URI , PNA less likely   - Fever of 100.8 > 101.7 at home   -Lactate 2.2-->2.1-->1.4   - s/p 2.7L IVF in ED   -s/p Vancomycin and Cefepime on ED   - RVP negative  - Chest X ray negative for infection and effusions   - ID consult appreciated   - Continue cefepime 2 g IV every 12 hrs #day 1   - F/U BC and UC   - Continue Tylenol 650 PRN for fever   - Monitor vitals     #Leukopenia  - WBC in decreased 4.33  - s/p 2.7 L of IV fluids  - Etiology could be due to dilutional effect or chemotherapy     #Acute Promyelocytic Leukemia  - Last chemo 2 weeks ago   - Continue outpatient management   - Monitor CBC     #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   -Trend, outpatient workup    #Thrombocytosis   -?reactive vs secondary to hematological malignancy   -likely reactive/acute phase reactant given fever/sepsis   -In decreased trend from 465 to 351    #Hyperglycemia   - Today   -No Hx of DM    #HLD   - Continue Lipitor 80mg     #HTN  - Continue Losartan 50 mg PO daily    #Hypothyroidism  - Continue Synthroid 50 mcg daily     #Anxiety/Depression  - Continue Zoloft 100mg PO daily     #DVT Prophylaxis   - Continue Lovenox 40 mg subcutaneus daily     Case discussed with Dr Armstrong 83 y/o female with a PMHx of anemia, 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    #Febrile Syndrome complicated by sepsis in an immunocompromised host  - Mostly due to viral URI , PNA less likely   - Fever of 100.8 > 101.7 at home   -Lactate 2.2-->2.1-->1.4   - s/p 2.7L IVF in ED   -s/p Vancomycin and Cefepime on ED   - RVP negative  - Chest X ray negative for infection and effusions   - ID consult appreciated   - Continue cefepime 2 g IV every 12 hrs #day 1   - F/U BC and UC   - Continue Tylenol 650 PRN for fever   - Monitor vitals     #Leukopenia  - WBC in decreased 4.33  - s/p 2.7 L of IV fluids  - Etiology could be due to dilutional effect or chemotherapy     #Acute Promyelocytic Leukemia  - Last chemo 2 weeks ago   - Continue outpatient management   - Monitor CBC     #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

## 2020-01-07 NOTE — ED ADULT NURSE REASSESSMENT NOTE - NS ED NURSE REASSESS COMMENT FT1
no acute respiratory distress, ambulating with steady gait, tolerated dinner, no c/o nausea/vomiting, resting in bed comfortably.

## 2020-01-07 NOTE — PATIENT PROFILE ADULT - VISION (WITH CORRECTIVE LENSES IF THE PATIENT USUALLY WEARS THEM):
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

## 2020-01-07 NOTE — PROGRESS NOTE ADULT - SUBJECTIVE AND OBJECTIVE BOX
Pt has been seen and examined with FP resident, resident supervised agree with a/p       Patient is a 84y old  Female who presents with a chief complaint of fever complicated with sepsis (07 Jan 2020 09:04)      PHYSICAL EXAM:  Vital Signs Last 24 Hrs  T(C): 36.5 (07 Jan 2020 04:48), Max: 37.3 (07 Jan 2020 03:25)  T(F): 97.7 (07 Jan 2020 04:48), Max: 99.2 (07 Jan 2020 03:25)  HR: 69 (07 Jan 2020 04:48) (67 - 98)  BP: 95/38 (07 Jan 2020 04:48) (95/38 - 139/54)  BP(mean): --  RR: 18 (07 Jan 2020 04:48) (16 - 18)  SpO2: 100% (07 Jan 2020 04:48) (97% - 100%)  general- comfortable   -rs-aeeb,cta  -cvs-s1s2 normal   -p/a-soft,bs+  -extremity- no asymmetrical swelling noted   -cns- non focal       A/P    #ct abx, possible source if urti     #Dropping cell counts- most likely due to recent chemo     #dvt pr

## 2020-01-07 NOTE — CHART NOTE - NSCHARTNOTEFT_GEN_A_CORE
POST SEPSIS NOTE     Pt seen and evaluated at bedside. No acute distress. Resting comfortably. Receiving IV abx     Vitals   Vital Signs Last 24 Hrs  T(C): 36.5 (07 Jan 2020 04:48), Max: 37.3 (07 Jan 2020 03:25)  T(F): 97.7 (07 Jan 2020 04:48), Max: 99.2 (07 Jan 2020 03:25)  HR: 69 (07 Jan 2020 04:48) (67 - 98)  BP: 95/38 (07 Jan 2020 04:48) (95/38 - 139/54)  RR: 18 (07 Jan 2020 04:48) (16 - 18)  SpO2: 100% (07 Jan 2020 04:48) (97% - 100%)    exam  NAD   s1, s2   CTAB  NT, ND, +BS  extremties warm, 2+ pulses in all extremities, <2 sec cap refill     A/P   Sepsis likely secondary to viral illness/?PNA  repeat lactate: wnl   hemodynamically stable     Arnav Hamilton (PGY-1)

## 2020-01-07 NOTE — CONSULT NOTE ADULT - ASSESSMENT
83 y/o female with h/o anemia, acute Promyelocytic Leukemia on chemotherapy, HTN, HLD and hypothyroidism was admitted on 1/6 for fever. Pt states she noticed that she had a temperature of 100.8F on the day of admission. States she went to get evaluated at her Hemotologist/Oncologist Dr. Forte, where she was found to have temperature 101.7 and low WBC when the labs were drawn. Patient was sent to ED by her Heme/Onc for further evaluation. Patient reports of clear rhinorrhea. In ER she received cefepime.     1. Febrile syndrome. ?cause. Probable URI. Acute promyelocytic leukemia on chemotherapy. Immunocompromised host.  -leukopenia  -obtain BC x 2, urine c/s  -no clinical or radiological evidence of penumonia  -agree with cefepime 2 gm IV q12h   -reason for abx use and side effects reviewed with patient; monitor BMP   -old chart reviewed to assess prior cultures  -monitor temps  -f/u CBC  -supportive care  2. Other issues:   -care per medicine

## 2020-01-07 NOTE — CONSULT NOTE ADULT - SUBJECTIVE AND OBJECTIVE BOX
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 Pt states she noticed that she had a temperature of 100.8F on the day of admission. States she went to get evaluated at her Hemotologist/Oncologist Dr. Forte, where she was found to have temperature 101.7 and low WBC when the labs were drawn. Patient was sent to ED by her Heme/Onc for further evaluation. Patient reports of clear rhinorrhea. In ER she received cefepime.     Patient reports of receiving 1st round of chemotherapy about 2 weeks ago and reports of chemotherapy associated fatigue which has since resolved.      PMH: as above  PSH: as above  Meds: per reconciliation sheet, noted below  MEDICATIONS  (STANDING):  aspirin enteric coated 81 milliGRAM(s) Oral daily  atorvastatin 80 milliGRAM(s) Oral at bedtime  cefepime  Injectable. 1000 milliGRAM(s) IV Push every 12 hours  enoxaparin Injectable 40 milliGRAM(s) SubCutaneous daily  influenza   Vaccine 0.5 milliLiter(s) IntraMuscular once  levothyroxine 50 MICROGram(s) Oral daily  losartan 50 milliGRAM(s) Oral daily  sertraline 100 milliGRAM(s) Oral daily  vancomycin  IVPB 1000 milliGRAM(s) IV Intermittent every 12 hours    MEDICATIONS  (PRN):  acetaminophen   Tablet .. 650 milliGRAM(s) Oral every 6 hours PRN Mild Pain (1 - 3)    Allergies    No Known Allergies    Intolerances    Social: no smoking, no alcohol, no illegal drugs; no recent travel, no exposure to TB   Pt states she has a stray cat at home and feeds raccoons and birds.   FAMILY HISTORY:  Family history of cancer in brother: Colon cancer  Patient's mother is : Hx of breast and bone cancer  Patient's father is : Hx of spine cancer    no history of premature cardiovascular disease in first degree relatives    ROS: the patient denies HA, no seizures, no dizziness, no sore throat, no nasal congestion, no blurry vision, no CP, no palpitations, no SOB, no cough, no abdominal pain, no diarrhea, no N/V, no dysuria, no leg pain, no claudication, no rash, no joint aches, no rectal pain or bleeding, no night sweats  All other systems reviewed and are negative    Vital Signs Last 24 Hrs  T(C): 36.5 (2020 04:48), Max: 37.3 (2020 03:25)  T(F): 97.7 (2020 04:48), Max: 99.2 (2020 03:25)  HR: 69 (2020 04:48) (67 - 98)  BP: 95/38 (2020 04:48) (95/38 - 139/54)  BP(mean): --  RR: 18 (2020 04:48) (16 - 18)  SpO2: 100% (2020 04:48) (97% - 100%)  Daily Height in cm: 144.78 (2020 04:48)    Daily     PE:    Constitutional: frail looking  HEENT: NC/AT, EOMI, PERRLA, conjunctivae clear; ears and nose atraumatic; pharynx benign  Neck: supple; thyroid not palpable  Back: no tenderness  Respiratory: respiratory effort normal; clear to auscultation  Cardiovascular: S1S2 regular, no murmurs  Abdomen: soft, not tender, not distended, positive BS; no liver or spleen organomegaly  Genitourinary: no suprapubic tenderness  Lymphatic: no LN palpable  Musculoskeletal: no muscle tenderness, no joint swelling or tenderness  Extremities: no pedal edema  Neurological/ Psychiatric: AxOx3, judgement and insight normal; moving all extremities  Skin: no rashes; no palpable lesions    Labs: all available labs reviewed                        7.5    2.86  )-----------( 351      ( 2020 07:39 )             22.6     01-07    141  |  112<H>  |  15  ----------------------------<  132<H>  3.7   |  24  |  0.66    Ca    8.1<L>      2020 07:39    TPro  7.2  /  Alb  3.6  /  TBili  0.2  /  DBili  x   /  AST  18  /  ALT  26  /  AlkPhos  43  01-06     LIVER FUNCTIONS - ( 2020 17:56 )  Alb: 3.6 g/dL / Pro: 7.2 gm/dL / ALK PHOS: 43 U/L / ALT: 26 U/L / AST: 18 U/L / GGT: x           Urinalysis Basic - ( 2020 04:45 )    Color: Yellow / Appearance: Clear / S.010 / pH: x  Gluc: x / Ketone: Negative  / Bili: Negative / Urobili: Negative mg/dL   Blood: x / Protein: Negative mg/dL / Nitrite: Negative   Leuk Esterase: Negative / RBC: x / WBC x   Sq Epi: x / Non Sq Epi: x / Bacteria: x    Rapid Respiratory Viral Panel (20 @ 17:56)    Rapid RVP Result: NotDetec: This Respiratory Panel uses polymerase chain reaction (PCR) to detect for  adenovirus; coronavirus (HKU1, NL63, 229E, OC43); human metapneumovirus  (hMPV); human enterovirus/rhinovirus (Entero/RV); influenza A; influenza  A/H1; influenza A/H3; influenza A/H1-2009; influenza B; parainfluenza  viruses 1, 2, 3, 4; respiratory syncytial virus; Mycoplasma pneumoniae;  and Chlamydophila pneumoniae.      Radiology: all available radiological tests reviewed    < from: Xray Chest 1 View-PORTABLE IMMEDIATE (20 @ 18:26) >  Heart/Mediastinum/Lungs/Other: The heart size is normal.The lungs are clear. Increased opacity over the left mid to lower chest is likely related to the presence of a breast prosthesis. There are multiple surgical clips in the left axilla. There are no pleural effusions.    < end of copied text >      Advanced directives addressed: full resuscitation

## 2020-01-07 NOTE — PROGRESS NOTE ADULT - SUBJECTIVE AND OBJECTIVE BOX
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 Pt states she noticed that she had a temperature of 100.8F on the day of admission. States she went to get evaluated at her Hemotologist/Oncologist Dr. Forte, where she was found to have temperature 101.7 and low WBC when the labs were drawn. Patient was sent to ED by her Heme/Onc for further evaluation. Patient reports of clear rhinorrhea. In ER she received cefepime.     Patient reports of receiving 1st round of chemotherapy about 2 weeks ago and reports of chemotherapy associated fatigue which has since resolved.      PMH: as above  PSH: as above  Meds: per reconciliation sheet, noted below  MEDICATIONS  (STANDING):  aspirin enteric coated 81 milliGRAM(s) Oral daily  atorvastatin 80 milliGRAM(s) Oral at bedtime  cefepime  Injectable. 1000 milliGRAM(s) IV Push every 12 hours  enoxaparin Injectable 40 milliGRAM(s) SubCutaneous daily  influenza   Vaccine 0.5 milliLiter(s) IntraMuscular once  levothyroxine 50 MICROGram(s) Oral daily  losartan 50 milliGRAM(s) Oral daily  sertraline 100 milliGRAM(s) Oral daily  vancomycin  IVPB 1000 milliGRAM(s) IV Intermittent every 12 hours    MEDICATIONS  (PRN):  acetaminophen   Tablet .. 650 milliGRAM(s) Oral every 6 hours PRN Mild Pain (1 - 3)    Allergies    No Known Allergies    Intolerances    Social: no smoking, no alcohol, no illegal drugs; no recent travel, no exposure to TB   Pt states she has a stray cat at home and feeds raccoons and birds.   FAMILY HISTORY:  Family history of cancer in brother: Colon cancer  Patient's mother is : Hx of breast and bone cancer  Patient's father is : Hx of spine cancer    no history of premature cardiovascular disease in first degree relatives    ROS: the patient denies HA, no seizures, no dizziness, no sore throat, no nasal congestion, no blurry vision, no CP, no palpitations, no SOB, no cough, no abdominal pain, no diarrhea, no N/V, no dysuria, no leg pain, no claudication, no rash, no joint aches, no rectal pain or bleeding, no night sweats  All other systems reviewed and are negative    Vital Signs Last 24 Hrs  T(C): 36.5 (2020 04:48), Max: 37.3 (2020 03:25)  T(F): 97.7 (2020 04:48), Max: 99.2 (2020 03:25)  HR: 69 (2020 04:48) (67 - 98)  BP: 95/38 (2020 04:48) (95/38 - 139/54)  BP(mean): --  RR: 18 (2020 04:48) (16 - 18)  SpO2: 100% (2020 04:48) (97% - 100%)  Daily Height in cm: 144.78 (2020 04:48)    Daily     PE:    Constitutional: frail looking  HEENT: NC/AT, EOMI, PERRLA, conjunctivae clear; ears and nose atraumatic; pharynx benign  Neck: supple; thyroid not palpable  Back: no tenderness  Respiratory: respiratory effort normal; clear to auscultation  Cardiovascular: S1S2 regular, no murmurs  Abdomen: soft, not tender, not distended, positive BS; no liver or spleen organomegaly  Genitourinary: no suprapubic tenderness  Lymphatic: no LN palpable  Musculoskeletal: no muscle tenderness, no joint swelling or tenderness  Extremities: no pedal edema  Neurological/ Psychiatric: AxOx3, judgement and insight normal; moving all extremities  Skin: no rashes; no palpable lesions    Labs: all available labs reviewed                        7.5    2.86  )-----------( 351      ( 2020 07:39 )             22.6     01-07    141  |  112<H>  |  15  ----------------------------<  132<H>  3.7   |  24  |  0.66    Ca    8.1<L>      2020 07:39    TPro  7.2  /  Alb  3.6  /  TBili  0.2  /  DBili  x   /  AST  18  /  ALT  26  /  AlkPhos  43  01-06     LIVER FUNCTIONS - ( 2020 17:56 )  Alb: 3.6 g/dL / Pro: 7.2 gm/dL / ALK PHOS: 43 U/L / ALT: 26 U/L / AST: 18 U/L / GGT: x           Urinalysis Basic - ( 2020 04:45 )    Color: Yellow / Appearance: Clear / S.010 / pH: x  Gluc: x / Ketone: Negative  / Bili: Negative / Urobili: Negative mg/dL   Blood: x / Protein: Negative mg/dL / Nitrite: Negative   Leuk Esterase: Negative / RBC: x / WBC x   Sq Epi: x / Non Sq Epi: x / Bacteria: x    Rapid Respiratory Viral Panel (20 @ 17:56)    Rapid RVP Result: NotDetec: This Respiratory Panel uses polymerase chain reaction (PCR) to detect for  adenovirus; coronavirus (HKU1, NL63, 229E, OC43); human metapneumovirus  (hMPV); human enterovirus/rhinovirus (Entero/RV); influenza A; influenza  A/H1; influenza A/H3; influenza A/H1-2009; influenza B; parainfluenza  viruses 1, 2, 3, 4; respiratory syncytial virus; Mycoplasma pneumoniae;  and Chlamydophila pneumoniae.      Radiology: all available radiological tests reviewed    < from: Xray Chest 1 View-PORTABLE IMMEDIATE (20 @ 18:26) >  Heart/Mediastinum/Lungs/Other: The heart size is normal.The lungs are clear. Increased opacity over the left mid to lower chest is likely related to the presence of a breast prosthesis. There are multiple surgical clips in the left axilla. There are no pleural effusions.    < end of copied text >      Advanced directives addressed: full resuscitation

## 2020-01-08 VITALS
OXYGEN SATURATION: 97 % | RESPIRATION RATE: 18 BRPM | SYSTOLIC BLOOD PRESSURE: 120 MMHG | HEART RATE: 74 BPM | DIASTOLIC BLOOD PRESSURE: 44 MMHG | TEMPERATURE: 98 F

## 2020-01-08 LAB
ANION GAP SERPL CALC-SCNC: 5 MMOL/L — SIGNIFICANT CHANGE UP (ref 5–17)
BUN SERPL-MCNC: 16 MG/DL — SIGNIFICANT CHANGE UP (ref 7–23)
CALCIUM SERPL-MCNC: 8.1 MG/DL — LOW (ref 8.5–10.1)
CHLORIDE SERPL-SCNC: 115 MMOL/L — HIGH (ref 96–108)
CO2 SERPL-SCNC: 23 MMOL/L — SIGNIFICANT CHANGE UP (ref 22–31)
CREAT SERPL-MCNC: 0.62 MG/DL — SIGNIFICANT CHANGE UP (ref 0.5–1.3)
CULTURE RESULTS: SIGNIFICANT CHANGE UP
GLUCOSE SERPL-MCNC: 132 MG/DL — HIGH (ref 70–99)
HCT VFR BLD CALC: 22.4 % — LOW (ref 34.5–45)
HGB BLD-MCNC: 7.4 G/DL — LOW (ref 11.5–15.5)
MAGNESIUM SERPL-MCNC: 2 MG/DL — SIGNIFICANT CHANGE UP (ref 1.6–2.6)
MCHC RBC-ENTMCNC: 33 GM/DL — SIGNIFICANT CHANGE UP (ref 32–36)
MCHC RBC-ENTMCNC: 36.3 PG — HIGH (ref 27–34)
MCV RBC AUTO: 109.8 FL — HIGH (ref 80–100)
PHOSPHATE SERPL-MCNC: 2.6 MG/DL — SIGNIFICANT CHANGE UP (ref 2.5–4.5)
PLATELET # BLD AUTO: 379 K/UL — SIGNIFICANT CHANGE UP (ref 150–400)
POTASSIUM SERPL-MCNC: 4.1 MMOL/L — SIGNIFICANT CHANGE UP (ref 3.5–5.3)
POTASSIUM SERPL-SCNC: 4.1 MMOL/L — SIGNIFICANT CHANGE UP (ref 3.5–5.3)
RBC # BLD: 2.04 M/UL — LOW (ref 3.8–5.2)
RBC # FLD: 15.6 % — HIGH (ref 10.3–14.5)
SODIUM SERPL-SCNC: 143 MMOL/L — SIGNIFICANT CHANGE UP (ref 135–145)
SPECIMEN SOURCE: SIGNIFICANT CHANGE UP
WBC # BLD: 2.63 K/UL — LOW (ref 3.8–10.5)
WBC # FLD AUTO: 2.63 K/UL — LOW (ref 3.8–10.5)

## 2020-01-08 RX ORDER — SERTRALINE 25 MG/1
1 TABLET, FILM COATED ORAL
Qty: 0 | Refills: 0 | DISCHARGE

## 2020-01-08 RX ORDER — SERTRALINE 25 MG/1
1 TABLET, FILM COATED ORAL
Qty: 0 | Refills: 0 | DISCHARGE
Start: 2020-01-08

## 2020-01-08 RX ADMIN — INFLUENZA VIRUS VACCINE 0.5 MILLILITER(S): 15; 15; 15; 15 SUSPENSION INTRAMUSCULAR at 16:16

## 2020-01-08 RX ADMIN — Medication 50 MICROGRAM(S): at 05:29

## 2020-01-08 RX ADMIN — Medication 81 MILLIGRAM(S): at 12:11

## 2020-01-08 RX ADMIN — CEFEPIME 100 MILLIGRAM(S): 1 INJECTION, POWDER, FOR SOLUTION INTRAMUSCULAR; INTRAVENOUS at 16:21

## 2020-01-08 RX ADMIN — LOSARTAN POTASSIUM 50 MILLIGRAM(S): 100 TABLET, FILM COATED ORAL at 05:29

## 2020-01-08 RX ADMIN — SERTRALINE 100 MILLIGRAM(S): 25 TABLET, FILM COATED ORAL at 12:10

## 2020-01-08 RX ADMIN — CEFEPIME 100 MILLIGRAM(S): 1 INJECTION, POWDER, FOR SOLUTION INTRAMUSCULAR; INTRAVENOUS at 05:29

## 2020-01-08 RX ADMIN — ENOXAPARIN SODIUM 40 MILLIGRAM(S): 100 INJECTION SUBCUTANEOUS at 12:11

## 2020-01-08 NOTE — PROGRESS NOTE ADULT - SUBJECTIVE AND OBJECTIVE BOX
Date of service: 20 @ 07:56    Lying in bed in NAD  No further fever  No cough or SOB    ROS: no fever or chills; denies dizziness, no HA, no abdominal pain, no diarrhea or constipation; no dysuria, no legs pain, no rashes    MEDICATIONS  (STANDING):  aspirin enteric coated 81 milliGRAM(s) Oral daily  atorvastatin 80 milliGRAM(s) Oral at bedtime  cefepime   IVPB 2000 milliGRAM(s) IV Intermittent every 12 hours  enoxaparin Injectable 40 milliGRAM(s) SubCutaneous daily  influenza   Vaccine 0.5 milliLiter(s) IntraMuscular once  levothyroxine 50 MICROGram(s) Oral daily  losartan 50 milliGRAM(s) Oral daily  sertraline 100 milliGRAM(s) Oral daily      Vital Signs Last 24 Hrs  T(C): 36.8 (2020 05:06), Max: 36.8 (2020 05:06)  T(F): 98.3 (2020 05:06), Max: 98.3 (2020 05:06)  HR: 74 (2020 05:06) (74 - 80)  BP: 118/53 (2020 05:06) (105/42 - 139/45)  BP(mean): --  RR: 18 (2020 05:06) (18 - 18)  SpO2: 98% (2020 05:06) (97% - 98%)    Physical Exam:      Constitutional: frail looking  HEENT: NC/AT, EOMI, PERRLA, conjunctivae clear  Neck: supple; thyroid not palpable  Back: no tenderness  Respiratory: respiratory effort normal; clear to auscultation  Cardiovascular: S1S2 regular, no murmurs  Abdomen: soft, not tender, not distended, positive BS  Genitourinary: no suprapubic tenderness  Lymphatic: no LN palpable  Musculoskeletal: no muscle tenderness, no joint swelling or tenderness  Extremities: no pedal edema  Neurological/ Psychiatric: AxOx3, moving all extremities  Skin: no rashes; no palpable lesions    Labs: reviewed                        7.4    2.63  )-----------( 379      ( 2020 06:55 )             22.4     01-08    143  |  115<H>  |  16  ----------------------------<  132<H>  4.1   |  23  |  0.62    Ca    8.1<L>      2020 06:55  Phos  2.6     01-08  Mg     2.0     01-08    TPro  7.2  /  Alb  3.6  /  TBili  0.2  /  DBili  x   /  AST  18  /  ALT  26  /  AlkPhos  43  01-06                        7.5    2.86  )-----------( 351      ( 2020 07:39 )             22.6     01-07    141  |  112<H>  |  15  ----------------------------<  132<H>  3.7   |  24  |  0.66    Ca    8.1<L>      2020 07:39    TPro  7.2  /  Alb  3.6  /  TBili  0.2  /  DBili  x   /  AST  18  /  ALT  26  /  AlkPhos  43  01-06     LIVER FUNCTIONS - ( 2020 17:56 )  Alb: 3.6 g/dL / Pro: 7.2 gm/dL / ALK PHOS: 43 U/L / ALT: 26 U/L / AST: 18 U/L / GGT: x           Urinalysis Basic - ( 2020 04:45 )    Color: Yellow / Appearance: Clear / S.010 / pH: x  Gluc: x / Ketone: Negative  / Bili: Negative / Urobili: Negative mg/dL   Blood: x / Protein: Negative mg/dL / Nitrite: Negative   Leuk Esterase: Negative / RBC: x / WBC x   Sq Epi: x / Non Sq Epi: x / Bacteria: x    Rapid Respiratory Viral Panel (20 @ 17:56)    Rapid RVP Result: NotDetec: This Respiratory Panel uses polymerase chain reaction (PCR) to detect for  adenovirus; coronavirus (HKU1, NL63, 229E, OC43); human metapneumovirus  (hMPV); human enterovirus/rhinovirus (Entero/RV); influenza A; influenza  A/H1; influenza A/H3; influenza A/H1-2009; influenza B; parainfluenza  viruses 1, 2, 3, 4; respiratory syncytial virus; Mycoplasma pneumoniae;  and Chlamydophila pneumoniae.      Culture - Blood (collected 2020 17:56)  Source: .Blood None  Preliminary Report (2020 01:04):    No growth to date.    Culture - Blood (collected 2020 17:56)  Source: .Blood None  Preliminary Report (2020 01:04):    No growth to date.    Radiology: all available radiological tests reviewed    < from: Xray Chest 1 View-PORTABLE IMMEDIATE (20 @ 18:26) >  Heart/Mediastinum/Lungs/Other: The heart size is normal.The lungs are clear. Increased opacity over the left mid to lower chest is likely related to the presence of a breast prosthesis. There are multiple surgical clips in the left axilla. There are no pleural effusions.    < end of copied text >      Advanced directives addressed: full resuscitation

## 2020-01-08 NOTE — DISCHARGE NOTE PROVIDER - NSDCCPCAREPLAN_GEN_ALL_CORE_FT
PRINCIPAL DISCHARGE DIAGNOSIS  Diagnosis: Fever, unspecified fever cause  Assessment and Plan of Treatment: You came for evaluation of fever. During admission you remained w/o fever. You received antibiotic for 2 daybut you dont need to continue it at home. You also were foun with low blood level Hb 7.4 and received a blood tranfusion.You need to follow up with Dr Romaine Kirk on Friday for evaluation and drawn of bood.      SECONDARY DISCHARGE DIAGNOSES  Diagnosis: Anemia  Assessment and Plan of Treatment: You were found with low blood level and  received a blod tranfusion. You need to follow up with Dr Romaine Kirk.    Diagnosis: Leukemia in relapse, unspecified leukemia type  Assessment and Plan of Treatment: Continue outpatient treatment with Dr Romaine Kirk. Make an appt for Friday PRINCIPAL DISCHARGE DIAGNOSIS  Diagnosis: Fever, unspecified fever cause  Assessment and Plan of Treatment: You came for evaluation of fever. During admission you remained without fever. You received antibiotic for 2 days but you dont need to continue it at home. You also were found with low blood level Hemoglobin 7.4 and received a blood tranfusion.You need to follow up with Dr Romaine Kirk or Dr. Leone on Friday for evaluation and drawn of bood.      SECONDARY DISCHARGE DIAGNOSES  Diagnosis: Anemia  Assessment and Plan of Treatment: You were found with low blood level and  received a blood tranfusion. You need to follow up with Dr Romaine Kirk.    Diagnosis: Leukemia in relapse, unspecified leukemia type  Assessment and Plan of Treatment: Continue outpatient treatment with Dr Romaine Kirk. Make an appt for Friday

## 2020-01-08 NOTE — PROGRESS NOTE ADULT - ASSESSMENT
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 Pt states she noticed that she had a temperature of 100.8F on the day of admission. States she went to get evaluated at her Hemotologist/Oncologist Dr. Forte, where she was found to have temperature 101.7 and low WBC when the labs were drawn. Patient was sent to ED by her Heme/Onc for further evaluation. Patient reports of clear rhinorrhea. In ER she received cefepime.     1. Febrile syndrome resolved. ?cause. Probable URI. Acute promyelocytic leukemia on chemotherapy. Immunocompromised host.  -leukopenia  -BC x 2 are negative to date  -no clinical or radiological evidence of penumonia  -on cefepime 2 gm IV q12h # 2  -tolerating abx well so far; no side effects noted  -no evidence of sepsis  -no evidence of bacterial disease  -will d/c further abx coverage  -f/u cultures  -monitor temps  -f/u CBC  -supportive care  2. Other issues:   -care per medicine

## 2020-01-08 NOTE — DISCHARGE NOTE NURSING/CASE MANAGEMENT/SOCIAL WORK - PATIENT PORTAL LINK FT
You can access the FollowMyHealth Patient Portal offered by [facility] by registering at the following website: [email or web address]. By joining [email or web address] FollowMyHealth portal, you will also be able to view your health information using other applications (apps) compatible with our system.

## 2020-01-08 NOTE — DISCHARGE NOTE PROVIDER - CARE PROVIDER_API CALL
Romaine Kirk)  Medical Oncology  [address] [place] [address], [address] [place] [address]  Phone: [phone]  Fax: [phone]  Follow Up Time:

## 2020-01-08 NOTE — PROGRESS NOTE ADULT - REASON FOR ADMISSION
fever complicated with sepsis

## 2020-01-08 NOTE — DISCHARGE NOTE PROVIDER - NSDCMRMEDTOKEN_GEN_ALL_CORE_FT
aspirin 81 mg oral tablet: 1 tab(s) orally once a day  fenofibrate 160 mg oral tablet: 1 tab(s) orally once a day  levothyroxine 50 mcg (0.05 mg) oral tablet: 1 tab(s) orally once a day  losartan 50 mg oral tablet: 1 tab(s) orally once a day (in the evening)  rosuvastatin 20 mg oral tablet: 1 tab(s) orally once a day  sertraline 100 mg oral tablet: 1 tab(s) orally once a day

## 2020-01-08 NOTE — DISCHARGE NOTE PROVIDER - HOSPITAL COURSE
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 Pt noticed that she had a temperature of 100.8 and went to get evaluated at her Hemotologist/Oncologist Dr. Forte, where she was found to have temperature 101.7 and low WBC when the labs were drawn. Patient was sent to ED by her Heme/Onc for further evaluation. In ED VS within normal limits w/o evidence of fever. Lactate was 2.2 and pt received approximately 2.7 L of IV fluids with improvement of lactate to 1.4. Chest X ray performed and showed no evidence of infection. RVP panel negative. Due patient is immunocompromised and currently on chemotherapy pt was admitted for observation. Pt received abx treatment with cefepime 2 g IV every 12 hrs but by ID recommendation she don't need to continue in abx treatment. Repeated CBC on 1/7/20 showed leukopenia of 2.86 and Hb 7.5.Hemotologist/Oncologist recommended to transfuse 1 PRBC on 1/8/20 before discharge. Pt have remained afebrile since admission and is hemodynamically stable. She will be discharge home and will follow with her Hemotologist/Oncologist Dr. Forte on Friday for follow up.        SUBJECTIVE:    Pt is evaluated at bedside and found AAOX3, hemodynamically stable and in no acute distress. She reports feel well and denies fever, chills, SOB, chest pain, leg swelling an any other symptoms. Today labs showed a HB 7.4 by recommendations of hemo- oncologist she will receive 1 PRBC and will be discharge home for follow up outpatient.         Vital Signs Last 24 Hrs    T(C): 37.1 (08 Jan 2020 14:07), Max: 37.1 (08 Jan 2020 14:07)    T(F): 98.8 (08 Jan 2020 14:07), Max: 98.8 (08 Jan 2020 14:07)    HR: 75 (08 Jan 2020 14:07) (74 - 87)    BP: 108/41 (08 Jan 2020 14:07) (108/41 - 139/45)    BP(mean): --    RR: 18 (08 Jan 2020 14:07) (18 - 18)    SpO2: 97% (08 Jan 2020 14:07) (97% - 99%)        GEN: NAD, comfortable, resting in bed    HEENT: NC/AT, EOMI, PERRLA, MMM, clear conjunctiva and sclera, normal hearing, no nasal discharge, throat clear, no thrush, normal dentition.     NECK: supple, no JVD, no LAD, no thyromegaly    BACK:  ROM intact, no spinal/paraspinal tenderness    CHEST: R breast mastectomy scar noticed.     CV: S1S2, RRR, no mumur.     RESP: good air movement, CTABL, no rales, rhonchi or wheezing, respirations unlabored    ABD: +BS, soft, ND, NT, no guarding, no rigidity, no HSM    EXT: +2 radial and pedal pulses, no edema, no calve tenderness    SKIN: No visible Rashes or Ulcers    MSK: ROM intact in all extremities    NEURO:  sensory and CN 2-12 Grossly intact, no motor deficits, no, saddle anesthesia, AOx3    PSYCH: normal behavior         RADIOLOGIC:        Xray Chest 1 View-PORTABLE IMMEDIATE (01.06.20 @ 18:26) >        INTERPRETATION:  Clinical indication:    Sepsis        XR CHEST IMMEDIATE portable AP        Comparison:None        Findings:    Lines: None        Heart/Mediastinum/Lungs/Other: The heart size is normal.The lungs are clear. Increased opacity over the left mid to lower chest is likely related to the presence of a breast prosthesis. There are multiple surgical clips in the left axilla. There are no pleural effusions.        Impression:        As above.            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 Pt noticed that she had a temperature of 100.8 and went to get evaluated at her Hemotologist/Oncologist Dr. Forte, where she was found to have temperature 101.7 and low WBC when the labs were drawn. Patient was sent to ED by her Heme/Onc for further evaluation. In ED VS within normal limits w/o evidence of fever. Lactate was 2.2 and pt received approximately 2.7 L of IV fluids with improvement of lactate to 1.4. Chest X ray performed and showed no evidence of infection. RVP panel negative. Due patient is immunocompromised and currently on chemotherapy pt was admitted for observation. Pt received abx treatment with cefepime 2 g IV every 12 hrs but by ID recommendation she don't need to continue in abx treatment. Repeated CBC on 1/7/20 showed leukopenia of 2.86 and Hb 7.5.Hemotologist/Oncologist recommended to transfuse 1 PRBC on 1/8/20 before discharge. Pt have remained afebrile since admission and is hemodynamically stable. She will be discharge home and will follow with her Hemotologist/Oncologist Dr. Forte on Friday for follow up.        SUBJECTIVE:    Pt is evaluated at bedside and found AAOX3, hemodynamically stable and in no acute distress. She reports feel well and denies fever, chills, SOB, chest pain, leg swelling an any other symptoms. Today labs showed a HB 7.4 by recommendations of hemo- oncologist she will receive 1 PRBC and will be discharge home for follow up outpatient.         Vital Signs Last 24 Hrs    T(C): 37.1 (08 Jan 2020 14:07), Max: 37.1 (08 Jan 2020 14:07)    T(F): 98.8 (08 Jan 2020 14:07), Max: 98.8 (08 Jan 2020 14:07)    HR: 75 (08 Jan 2020 14:07) (74 - 87)    BP: 108/41 (08 Jan 2020 14:07) (108/41 - 139/45)    RR: 18 (08 Jan 2020 14:07) (18 - 18)    SpO2: 97% (08 Jan 2020 14:07) (97% - 99%)        GEN: NAD, comfortable, resting in bed    HEENT: NC/AT, EOMI, PERRLA, MMM, clear conjunctiva and sclera, normal hearing, no nasal discharge, throat clear, no thrush, normal dentition.     NECK: supple, no JVD, no LAD, no thyromegaly    BACK:  ROM intact, no spinal/paraspinal tenderness    CHEST: R breast mastectomy scar noticed.     CV: S1S2, RRR, no mumur.     RESP: good air movement, CTABL, no rales, rhonchi or wheezing, respirations unlabored    ABD: +BS, soft, ND, NT, no guarding, no rigidity, no HSM    EXT: +2 radial and pedal pulses, no edema, no calve tenderness    SKIN: No visible Rashes or Ulcers    MSK: ROM intact in all extremities    NEURO:  sensory and CN 2-12 Grossly intact, no motor deficits, no, saddle anesthesia, AOx3    PSYCH: normal behavior         RADIOLOGIC:        Xray Chest 1 View-PORTABLE IMMEDIATE (01.06.20 @ 18:26) >        INTERPRETATION:  Clinical indication:    Sepsis        XR CHEST IMMEDIATE portable AP        Comparison:None        Findings:    Lines: None        Heart/Mediastinum/Lungs/Other: The heart size is normal.The lungs are clear. Increased opacity over the left mid to lower chest is likely related to the presence of a breast prosthesis. There are multiple surgical clips in the left axilla. There are no pleural effusions.        Impression:        As above.

## 2020-01-12 LAB
CULTURE RESULTS: SIGNIFICANT CHANGE UP
CULTURE RESULTS: SIGNIFICANT CHANGE UP
SPECIMEN SOURCE: SIGNIFICANT CHANGE UP
SPECIMEN SOURCE: SIGNIFICANT CHANGE UP

## 2020-01-13 DIAGNOSIS — E03.9 HYPOTHYROIDISM, UNSPECIFIED: ICD-10-CM

## 2020-01-13 DIAGNOSIS — I10 ESSENTIAL (PRIMARY) HYPERTENSION: ICD-10-CM

## 2020-01-13 DIAGNOSIS — C95.92 LEUKEMIA, UNSPECIFIED, IN RELAPSE: ICD-10-CM

## 2020-01-13 DIAGNOSIS — D53.9 NUTRITIONAL ANEMIA, UNSPECIFIED: ICD-10-CM

## 2020-01-13 DIAGNOSIS — E87.2 ACIDOSIS: ICD-10-CM

## 2020-01-13 DIAGNOSIS — F32.9 MAJOR DEPRESSIVE DISORDER, SINGLE EPISODE, UNSPECIFIED: ICD-10-CM

## 2020-01-13 DIAGNOSIS — R73.9 HYPERGLYCEMIA, UNSPECIFIED: ICD-10-CM

## 2020-01-13 DIAGNOSIS — R50.9 FEVER, UNSPECIFIED: ICD-10-CM

## 2020-01-13 DIAGNOSIS — E78.5 HYPERLIPIDEMIA, UNSPECIFIED: ICD-10-CM

## 2020-01-13 DIAGNOSIS — F41.9 ANXIETY DISORDER, UNSPECIFIED: ICD-10-CM

## 2020-06-16 ENCOUNTER — INPATIENT (INPATIENT)
Facility: HOSPITAL | Age: 85
LOS: 3 days | Discharge: NURSING, REHAB OR LONG-TERM CARE | DRG: 481 | End: 2020-06-20
Attending: FAMILY MEDICINE | Admitting: FAMILY MEDICINE
Payer: MEDICARE

## 2020-06-16 VITALS
WEIGHT: 175.05 LBS | DIASTOLIC BLOOD PRESSURE: 68 MMHG | TEMPERATURE: 98 F | OXYGEN SATURATION: 100 % | RESPIRATION RATE: 18 BRPM | HEIGHT: 65 IN | HEART RATE: 67 BPM | SYSTOLIC BLOOD PRESSURE: 178 MMHG

## 2020-06-16 DIAGNOSIS — Z98.890 OTHER SPECIFIED POSTPROCEDURAL STATES: Chronic | ICD-10-CM

## 2020-06-16 DIAGNOSIS — S72.8X1A OTHER FRACTURE OF RIGHT FEMUR, INITIAL ENCOUNTER FOR CLOSED FRACTURE: ICD-10-CM

## 2020-06-16 PROBLEM — I10 ESSENTIAL (PRIMARY) HYPERTENSION: Chronic | Status: ACTIVE | Noted: 2020-01-06

## 2020-06-16 PROBLEM — D64.9 ANEMIA, UNSPECIFIED: Chronic | Status: ACTIVE | Noted: 2020-01-06

## 2020-06-16 PROBLEM — E78.5 HYPERLIPIDEMIA, UNSPECIFIED: Chronic | Status: ACTIVE | Noted: 2020-01-06

## 2020-06-16 PROBLEM — H35.30 UNSPECIFIED MACULAR DEGENERATION: Chronic | Status: ACTIVE | Noted: 2020-01-07

## 2020-06-16 PROBLEM — C92.40 ACUTE PROMYELOCYTIC LEUKEMIA, NOT HAVING ACHIEVED REMISSION: Chronic | Status: ACTIVE | Noted: 2020-01-06

## 2020-06-16 PROBLEM — H26.9 UNSPECIFIED CATARACT: Chronic | Status: ACTIVE | Noted: 2020-01-07

## 2020-06-16 PROBLEM — E03.9 HYPOTHYROIDISM, UNSPECIFIED: Chronic | Status: ACTIVE | Noted: 2020-01-06

## 2020-06-16 LAB
ADD ON TEST-SPECIMEN IN LAB: SIGNIFICANT CHANGE UP
ALBUMIN SERPL ELPH-MCNC: 3.4 G/DL — SIGNIFICANT CHANGE UP (ref 3.3–5)
ALP SERPL-CCNC: 39 U/L — LOW (ref 40–120)
ALT FLD-CCNC: 37 U/L — SIGNIFICANT CHANGE UP (ref 12–78)
ANION GAP SERPL CALC-SCNC: 4 MMOL/L — LOW (ref 5–17)
ANION GAP SERPL CALC-SCNC: 9 MMOL/L — SIGNIFICANT CHANGE UP (ref 5–17)
APTT BLD: 32.4 SEC — SIGNIFICANT CHANGE UP (ref 27.5–36.3)
AST SERPL-CCNC: 26 U/L — SIGNIFICANT CHANGE UP (ref 15–37)
BASOPHILS # BLD AUTO: 0.05 K/UL — SIGNIFICANT CHANGE UP (ref 0–0.2)
BASOPHILS NFR BLD AUTO: 0.7 % — SIGNIFICANT CHANGE UP (ref 0–2)
BILIRUB SERPL-MCNC: 0.4 MG/DL — SIGNIFICANT CHANGE UP (ref 0.2–1.2)
BLD GP AB SCN SERPL QL: SIGNIFICANT CHANGE UP
BUN SERPL-MCNC: 26 MG/DL — HIGH (ref 7–23)
BUN SERPL-MCNC: 30 MG/DL — HIGH (ref 7–23)
CALCIUM SERPL-MCNC: 7.6 MG/DL — LOW (ref 8.5–10.1)
CALCIUM SERPL-MCNC: 8.5 MG/DL — SIGNIFICANT CHANGE UP (ref 8.5–10.1)
CHLORIDE SERPL-SCNC: 106 MMOL/L — SIGNIFICANT CHANGE UP (ref 96–108)
CHLORIDE SERPL-SCNC: 109 MMOL/L — HIGH (ref 96–108)
CK SERPL-CCNC: 136 U/L — SIGNIFICANT CHANGE UP (ref 26–192)
CO2 SERPL-SCNC: 20 MMOL/L — LOW (ref 22–31)
CO2 SERPL-SCNC: 26 MMOL/L — SIGNIFICANT CHANGE UP (ref 22–31)
CREAT SERPL-MCNC: 0.76 MG/DL — SIGNIFICANT CHANGE UP (ref 0.5–1.3)
CREAT SERPL-MCNC: 0.93 MG/DL — SIGNIFICANT CHANGE UP (ref 0.5–1.3)
EOSINOPHIL # BLD AUTO: 0.13 K/UL — SIGNIFICANT CHANGE UP (ref 0–0.5)
EOSINOPHIL NFR BLD AUTO: 1.9 % — SIGNIFICANT CHANGE UP (ref 0–6)
GLUCOSE SERPL-MCNC: 178 MG/DL — HIGH (ref 70–99)
GLUCOSE SERPL-MCNC: 245 MG/DL — HIGH (ref 70–99)
HCT VFR BLD CALC: 22.2 % — LOW (ref 34.5–45)
HCT VFR BLD CALC: 34.6 % — SIGNIFICANT CHANGE UP (ref 34.5–45)
HGB BLD-MCNC: 11.6 G/DL — SIGNIFICANT CHANGE UP (ref 11.5–15.5)
HGB BLD-MCNC: 7.2 G/DL — LOW (ref 11.5–15.5)
IMM GRANULOCYTES NFR BLD AUTO: 0.4 % — SIGNIFICANT CHANGE UP (ref 0–1.5)
INR BLD: 1.17 RATIO — HIGH (ref 0.88–1.16)
LYMPHOCYTES # BLD AUTO: 1.42 K/UL — SIGNIFICANT CHANGE UP (ref 1–3.3)
LYMPHOCYTES # BLD AUTO: 21.3 % — SIGNIFICANT CHANGE UP (ref 13–44)
MCHC RBC-ENTMCNC: 32.3 PG — SIGNIFICANT CHANGE UP (ref 27–34)
MCHC RBC-ENTMCNC: 32.4 GM/DL — SIGNIFICANT CHANGE UP (ref 32–36)
MCHC RBC-ENTMCNC: 32.6 PG — SIGNIFICANT CHANGE UP (ref 27–34)
MCHC RBC-ENTMCNC: 33.5 GM/DL — SIGNIFICANT CHANGE UP (ref 32–36)
MCV RBC AUTO: 97.2 FL — SIGNIFICANT CHANGE UP (ref 80–100)
MCV RBC AUTO: 99.6 FL — SIGNIFICANT CHANGE UP (ref 80–100)
MONOCYTES # BLD AUTO: 0.71 K/UL — SIGNIFICANT CHANGE UP (ref 0–0.9)
MONOCYTES NFR BLD AUTO: 10.6 % — SIGNIFICANT CHANGE UP (ref 2–14)
NEUTROPHILS # BLD AUTO: 4.33 K/UL — SIGNIFICANT CHANGE UP (ref 1.8–7.4)
NEUTROPHILS NFR BLD AUTO: 65.1 % — SIGNIFICANT CHANGE UP (ref 43–77)
PLATELET # BLD AUTO: 190 K/UL — SIGNIFICANT CHANGE UP (ref 150–400)
PLATELET # BLD AUTO: 219 K/UL — SIGNIFICANT CHANGE UP (ref 150–400)
POTASSIUM SERPL-MCNC: 4 MMOL/L — SIGNIFICANT CHANGE UP (ref 3.5–5.3)
POTASSIUM SERPL-MCNC: 4.5 MMOL/L — SIGNIFICANT CHANGE UP (ref 3.5–5.3)
POTASSIUM SERPL-SCNC: 4 MMOL/L — SIGNIFICANT CHANGE UP (ref 3.5–5.3)
POTASSIUM SERPL-SCNC: 4.5 MMOL/L — SIGNIFICANT CHANGE UP (ref 3.5–5.3)
PROT SERPL-MCNC: 6.8 GM/DL — SIGNIFICANT CHANGE UP (ref 6–8.3)
PROTHROM AB SERPL-ACNC: 13 SEC — HIGH (ref 10–12.9)
RBC # BLD: 2.23 M/UL — LOW (ref 3.8–5.2)
RBC # BLD: 3.56 M/UL — LOW (ref 3.8–5.2)
RBC # FLD: 13.9 % — SIGNIFICANT CHANGE UP (ref 10.3–14.5)
RBC # FLD: 14.1 % — SIGNIFICANT CHANGE UP (ref 10.3–14.5)
SARS-COV-2 RNA SPEC QL NAA+PROBE: SIGNIFICANT CHANGE UP
SODIUM SERPL-SCNC: 136 MMOL/L — SIGNIFICANT CHANGE UP (ref 135–145)
SODIUM SERPL-SCNC: 138 MMOL/L — SIGNIFICANT CHANGE UP (ref 135–145)
TROPONIN I SERPL-MCNC: <0.015 NG/ML — SIGNIFICANT CHANGE UP (ref 0.01–0.04)
WBC # BLD: 6.67 K/UL — SIGNIFICANT CHANGE UP (ref 3.8–10.5)
WBC # BLD: 7.31 K/UL — SIGNIFICANT CHANGE UP (ref 3.8–10.5)
WBC # FLD AUTO: 6.67 K/UL — SIGNIFICANT CHANGE UP (ref 3.8–10.5)
WBC # FLD AUTO: 7.31 K/UL — SIGNIFICANT CHANGE UP (ref 3.8–10.5)

## 2020-06-16 PROCEDURE — 36415 COLL VENOUS BLD VENIPUNCTURE: CPT

## 2020-06-16 PROCEDURE — 82040 ASSAY OF SERUM ALBUMIN: CPT

## 2020-06-16 PROCEDURE — 80048 BASIC METABOLIC PNL TOTAL CA: CPT

## 2020-06-16 PROCEDURE — 97116 GAIT TRAINING THERAPY: CPT | Mod: GP

## 2020-06-16 PROCEDURE — 86923 COMPATIBILITY TEST ELECTRIC: CPT

## 2020-06-16 PROCEDURE — C1889: CPT

## 2020-06-16 PROCEDURE — 99221 1ST HOSP IP/OBS SF/LOW 40: CPT

## 2020-06-16 PROCEDURE — 99223 1ST HOSP IP/OBS HIGH 75: CPT

## 2020-06-16 PROCEDURE — 93010 ELECTROCARDIOGRAM REPORT: CPT

## 2020-06-16 PROCEDURE — 71045 X-RAY EXAM CHEST 1 VIEW: CPT

## 2020-06-16 PROCEDURE — 71045 X-RAY EXAM CHEST 1 VIEW: CPT | Mod: 26

## 2020-06-16 PROCEDURE — 36430 TRANSFUSION BLD/BLD COMPNT: CPT

## 2020-06-16 PROCEDURE — 82306 VITAMIN D 25 HYDROXY: CPT

## 2020-06-16 PROCEDURE — 27506 TREATMENT OF THIGH FRACTURE: CPT | Mod: AS

## 2020-06-16 PROCEDURE — 85027 COMPLETE CBC AUTOMATED: CPT

## 2020-06-16 PROCEDURE — 87635 SARS-COV-2 COVID-19 AMP PRB: CPT

## 2020-06-16 PROCEDURE — 97162 PT EVAL MOD COMPLEX 30 MIN: CPT | Mod: GP

## 2020-06-16 PROCEDURE — 73502 X-RAY EXAM HIP UNI 2-3 VIEWS: CPT | Mod: 26,RT

## 2020-06-16 PROCEDURE — P9016: CPT

## 2020-06-16 PROCEDURE — 97530 THERAPEUTIC ACTIVITIES: CPT | Mod: GP

## 2020-06-16 PROCEDURE — C1713: CPT

## 2020-06-16 PROCEDURE — 73552 X-RAY EXAM OF FEMUR 2/>: CPT | Mod: 26,RT

## 2020-06-16 PROCEDURE — 76000 FLUOROSCOPY <1 HR PHYS/QHP: CPT

## 2020-06-16 RX ORDER — LANOLIN ALCOHOL/MO/W.PET/CERES
3 CREAM (GRAM) TOPICAL AT BEDTIME
Refills: 0 | Status: DISCONTINUED | OUTPATIENT
Start: 2020-06-16 | End: 2020-06-16

## 2020-06-16 RX ORDER — LOSARTAN POTASSIUM 100 MG/1
50 TABLET, FILM COATED ORAL DAILY
Refills: 0 | Status: DISCONTINUED | OUTPATIENT
Start: 2020-06-16 | End: 2020-06-16

## 2020-06-16 RX ORDER — MORPHINE SULFATE 50 MG/1
4 CAPSULE, EXTENDED RELEASE ORAL ONCE
Refills: 0 | Status: DISCONTINUED | OUTPATIENT
Start: 2020-06-16 | End: 2020-06-16

## 2020-06-16 RX ORDER — MAGNESIUM HYDROXIDE 400 MG/1
30 TABLET, CHEWABLE ORAL DAILY
Refills: 0 | Status: DISCONTINUED | OUTPATIENT
Start: 2020-06-16 | End: 2020-06-16

## 2020-06-16 RX ORDER — ONDANSETRON 8 MG/1
4 TABLET, FILM COATED ORAL EVERY 6 HOURS
Refills: 0 | Status: DISCONTINUED | OUTPATIENT
Start: 2020-06-16 | End: 2020-06-20

## 2020-06-16 RX ORDER — MORPHINE SULFATE 50 MG/1
2 CAPSULE, EXTENDED RELEASE ORAL EVERY 4 HOURS
Refills: 0 | Status: DISCONTINUED | OUTPATIENT
Start: 2020-06-16 | End: 2020-06-20

## 2020-06-16 RX ORDER — POLYETHYLENE GLYCOL 3350 17 G/17G
17 POWDER, FOR SOLUTION ORAL DAILY
Refills: 0 | Status: DISCONTINUED | OUTPATIENT
Start: 2020-06-16 | End: 2020-06-16

## 2020-06-16 RX ORDER — HEPARIN SODIUM 5000 [USP'U]/ML
5000 INJECTION INTRAVENOUS; SUBCUTANEOUS EVERY 8 HOURS
Refills: 0 | Status: DISCONTINUED | OUTPATIENT
Start: 2020-06-16 | End: 2020-06-16

## 2020-06-16 RX ORDER — FENOFIBRATE,MICRONIZED 130 MG
145 CAPSULE ORAL DAILY
Refills: 0 | Status: DISCONTINUED | OUTPATIENT
Start: 2020-06-16 | End: 2020-06-16

## 2020-06-16 RX ORDER — LEVOTHYROXINE SODIUM 125 MCG
50 TABLET ORAL DAILY
Refills: 0 | Status: DISCONTINUED | OUTPATIENT
Start: 2020-06-16 | End: 2020-06-16

## 2020-06-16 RX ORDER — SENNA PLUS 8.6 MG/1
2 TABLET ORAL AT BEDTIME
Refills: 0 | Status: DISCONTINUED | OUTPATIENT
Start: 2020-06-16 | End: 2020-06-16

## 2020-06-16 RX ORDER — CEFAZOLIN SODIUM 1 G
2000 VIAL (EA) INJECTION EVERY 6 HOURS
Refills: 0 | Status: COMPLETED | OUTPATIENT
Start: 2020-06-16 | End: 2020-06-17

## 2020-06-16 RX ORDER — SODIUM CHLORIDE 9 MG/ML
1000 INJECTION INTRAMUSCULAR; INTRAVENOUS; SUBCUTANEOUS
Refills: 0 | Status: DISCONTINUED | OUTPATIENT
Start: 2020-06-16 | End: 2020-06-16

## 2020-06-16 RX ORDER — POLYETHYLENE GLYCOL 3350 17 G/17G
17 POWDER, FOR SOLUTION ORAL DAILY
Refills: 0 | Status: DISCONTINUED | OUTPATIENT
Start: 2020-06-16 | End: 2020-06-20

## 2020-06-16 RX ORDER — LANOLIN ALCOHOL/MO/W.PET/CERES
3 CREAM (GRAM) TOPICAL AT BEDTIME
Refills: 0 | Status: DISCONTINUED | OUTPATIENT
Start: 2020-06-16 | End: 2020-06-20

## 2020-06-16 RX ORDER — OXYCODONE HYDROCHLORIDE 5 MG/1
2.5 TABLET ORAL EVERY 4 HOURS
Refills: 0 | Status: DISCONTINUED | OUTPATIENT
Start: 2020-06-16 | End: 2020-06-16

## 2020-06-16 RX ORDER — ONDANSETRON 8 MG/1
4 TABLET, FILM COATED ORAL EVERY 6 HOURS
Refills: 0 | Status: DISCONTINUED | OUTPATIENT
Start: 2020-06-16 | End: 2020-06-16

## 2020-06-16 RX ORDER — MORPHINE SULFATE 50 MG/1
2 CAPSULE, EXTENDED RELEASE ORAL EVERY 4 HOURS
Refills: 0 | Status: DISCONTINUED | OUTPATIENT
Start: 2020-06-16 | End: 2020-06-16

## 2020-06-16 RX ORDER — FENTANYL CITRATE 50 UG/ML
50 INJECTION INTRAVENOUS
Refills: 0 | Status: DISCONTINUED | OUTPATIENT
Start: 2020-06-16 | End: 2020-06-16

## 2020-06-16 RX ORDER — OXYCODONE HYDROCHLORIDE 5 MG/1
5 TABLET ORAL ONCE
Refills: 0 | Status: DISCONTINUED | OUTPATIENT
Start: 2020-06-16 | End: 2020-06-16

## 2020-06-16 RX ORDER — ENOXAPARIN SODIUM 100 MG/ML
40 INJECTION SUBCUTANEOUS EVERY 24 HOURS
Refills: 0 | Status: DISCONTINUED | OUTPATIENT
Start: 2020-06-16 | End: 2020-06-20

## 2020-06-16 RX ORDER — LEVOTHYROXINE SODIUM 125 MCG
50 TABLET ORAL DAILY
Refills: 0 | Status: DISCONTINUED | OUTPATIENT
Start: 2020-06-16 | End: 2020-06-20

## 2020-06-16 RX ORDER — ATORVASTATIN CALCIUM 80 MG/1
80 TABLET, FILM COATED ORAL AT BEDTIME
Refills: 0 | Status: DISCONTINUED | OUTPATIENT
Start: 2020-06-16 | End: 2020-06-16

## 2020-06-16 RX ORDER — FAMOTIDINE 10 MG/ML
20 INJECTION INTRAVENOUS EVERY 12 HOURS
Refills: 0 | Status: DISCONTINUED | OUTPATIENT
Start: 2020-06-16 | End: 2020-06-20

## 2020-06-16 RX ORDER — SODIUM CHLORIDE 9 MG/ML
1000 INJECTION, SOLUTION INTRAVENOUS
Refills: 0 | Status: DISCONTINUED | OUTPATIENT
Start: 2020-06-16 | End: 2020-06-16

## 2020-06-16 RX ORDER — ATORVASTATIN CALCIUM 80 MG/1
80 TABLET, FILM COATED ORAL AT BEDTIME
Refills: 0 | Status: DISCONTINUED | OUTPATIENT
Start: 2020-06-16 | End: 2020-06-20

## 2020-06-16 RX ORDER — SERTRALINE 25 MG/1
100 TABLET, FILM COATED ORAL DAILY
Refills: 0 | Status: DISCONTINUED | OUTPATIENT
Start: 2020-06-16 | End: 2020-06-16

## 2020-06-16 RX ORDER — SODIUM CHLORIDE 9 MG/ML
1000 INJECTION, SOLUTION INTRAVENOUS
Refills: 0 | Status: DISCONTINUED | OUTPATIENT
Start: 2020-06-16 | End: 2020-06-20

## 2020-06-16 RX ORDER — ACETAMINOPHEN 500 MG
975 TABLET ORAL EVERY 8 HOURS
Refills: 0 | Status: DISCONTINUED | OUTPATIENT
Start: 2020-06-16 | End: 2020-06-20

## 2020-06-16 RX ORDER — ONDANSETRON 8 MG/1
4 TABLET, FILM COATED ORAL ONCE
Refills: 0 | Status: COMPLETED | OUTPATIENT
Start: 2020-06-16 | End: 2020-06-16

## 2020-06-16 RX ORDER — ACETAMINOPHEN 500 MG
975 TABLET ORAL EVERY 8 HOURS
Refills: 0 | Status: DISCONTINUED | OUTPATIENT
Start: 2020-06-16 | End: 2020-06-16

## 2020-06-16 RX ORDER — FAMOTIDINE 10 MG/ML
20 INJECTION INTRAVENOUS EVERY 12 HOURS
Refills: 0 | Status: DISCONTINUED | OUTPATIENT
Start: 2020-06-16 | End: 2020-06-16

## 2020-06-16 RX ORDER — FENOFIBRATE,MICRONIZED 130 MG
145 CAPSULE ORAL DAILY
Refills: 0 | Status: DISCONTINUED | OUTPATIENT
Start: 2020-06-16 | End: 2020-06-20

## 2020-06-16 RX ORDER — ASPIRIN/CALCIUM CARB/MAGNESIUM 324 MG
81 TABLET ORAL DAILY
Refills: 0 | Status: DISCONTINUED | OUTPATIENT
Start: 2020-06-16 | End: 2020-06-16

## 2020-06-16 RX ORDER — LOSARTAN POTASSIUM 100 MG/1
50 TABLET, FILM COATED ORAL DAILY
Refills: 0 | Status: DISCONTINUED | OUTPATIENT
Start: 2020-06-16 | End: 2020-06-20

## 2020-06-16 RX ORDER — OXYCODONE HYDROCHLORIDE 5 MG/1
5 TABLET ORAL EVERY 4 HOURS
Refills: 0 | Status: DISCONTINUED | OUTPATIENT
Start: 2020-06-16 | End: 2020-06-16

## 2020-06-16 RX ORDER — OXYCODONE HYDROCHLORIDE 5 MG/1
5 TABLET ORAL EVERY 4 HOURS
Refills: 0 | Status: DISCONTINUED | OUTPATIENT
Start: 2020-06-16 | End: 2020-06-20

## 2020-06-16 RX ADMIN — MORPHINE SULFATE 4 MILLIGRAM(S): 50 CAPSULE, EXTENDED RELEASE ORAL at 12:38

## 2020-06-16 RX ADMIN — ONDANSETRON 4 MILLIGRAM(S): 8 TABLET, FILM COATED ORAL at 21:34

## 2020-06-16 NOTE — DISCHARGE NOTE PROVIDER - HOSPITAL COURSE
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 She woke up feeling fine, hadn't eaten breakfast yet, when she made a phone call to get her car inspected. While on the telephone, she thinks she either lost her balance or may have felt dizzy and she fell backwards injuring her right hip. No LOC. She was subsequently unable to get up and brought to the ER. In ED found to have right subtrochanteric hip fracture.    She underwent IM nail 6/16.     She required prbcs post op for anemia related to surgery. She is now hemodynamically stable and will be discharged to [facility] today. She is recommended dvt px with lovenox X 4 weeks post procedure per a/c services.         for physical exam today please see progress note from 6/20        LABS:                            8.8      4.66  )-----------( 138      ( 20 Jun 2020 10:14 )               26.1         06-19        139  |  108  |  22    ----------------------------<  238<H>    3.9   |  24  |  0.65        Ca    7.7<L>      19 Jun 2020 06:28        Xray Chest 1 View-PORTABLE IMMEDIATE (06.16.20 @ 13:01) >    IMPRESSION: Signs of left mastectomy again noted.        FINAL DIAGNOSIS:    #S/p mechanical Fall with Right hip fracture s/p IMN POD#4.    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    summary to be faxed to pcp

## 2020-06-16 NOTE — H&P ADULT - HISTORY OF PRESENT ILLNESS
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 She woke up feeling fine, hadn't eaten breakfast yet, when she made a phone call to get her car inspected. While on the telephone, she thinks she either lost her balance or may have felt dizzy and she fell backwards injuring her right hip. No LOC. She was subsequently unable to get up and brought to the ER. In ED found to have right subtrochanteric hip fracture. She currently feels ok. No pain at rest. No sob/chest pain. no recent f/c/r. Was eating and drinking as usual.   Was supposed to have cataract surgery next month, so chemo schedule was being adjusted.   She exercises regularly (walks a mile daily). No difficulty climbing a flight of stairs prior to fall.   No h/o heart disease/stroke. no prior reactions to anesthesia.    ROS: all 10 systems reviewed and is as above otherwise negative.     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 She woke up feeling fine, hadn't eaten breakfast yet, when she made a phone call to get her car inspected. While on the telephone, she thinks she either lost her balance or may have felt dizzy and she fell backwards injuring her right hip. No LOC. She was subsequently unable to get up and brought to the ER. In ED found to have right subtrochanteric hip fracture. She currently feels ok. No pain at rest. No sob/chest pain. no recent f/c/r. Was eating and drinking as usual.   Was supposed to have cataract surgery next month, so chemo schedule was being adjusted.   She exercises regularly (walks a mile daily). No difficulty climbing a flight of stairs prior to fall.   No h/o heart disease/stroke. no prior reactions to anesthesia.    ROS: all 10 systems reviewed and is as above otherwise negative.     PMH: as above  PSH: left breast mastectomy  F/H; brother-colon ca, father-spinal ca, other-breast and bone ca  Social: No tobacco/etoh

## 2020-06-16 NOTE — DISCHARGE NOTE PROVIDER - CARE PROVIDER_API CALL
Pancho Salinas  ORTHOPAEDIC SURGERY  [address] [place] [address]  Phone: [phone]  Fax: [phone]  Follow Up Time: Pancho Salinas  ORTHOPAEDIC SURGERY  [address] [facility] [place] [address]  Phone: [phone]  Fax: [phone]  Follow Up Time:     Romaine Kirk  MEDICAL ONCOLOGY  [address] [place] [address]  Phone: [phone]  Fax: [phone]  Follow Up Time:

## 2020-06-16 NOTE — ED PROVIDER NOTE - PHYSICAL EXAMINATION
Constitutional: NAD, well appearing  HEENT: no rhinorrhea, PERRL, no oropharyngeal erythema or exudates, midline uvula.  TMs clear.  CVS:  RRR, no m/r/g  Resp:  CTAB  GI: soft, ntnd  MSK:  pain and deformity to prox R thigh anteriorly.  No hip ttp.  NVI distally.    Neuro:  A&Ox3, 5/5 strength to all extremities,  SILT to all extremities  Skin: no rash  psych: clear thought content  Heme/lymph:  No LAD

## 2020-06-16 NOTE — DISCHARGE NOTE PROVIDER - PROVIDER TOKENS
PROVIDER:[TOKEN:[[id number]]] PROVIDER:[TOKEN:[[id number]]],PROVIDER:[TOKEN:[[id number]]]

## 2020-06-16 NOTE — DISCHARGE NOTE PROVIDER - CARE PROVIDERS DIRECT ADDRESSES
,DirectAddress_Unknown ,DirectAddress_Unknown,[email or web address]

## 2020-06-16 NOTE — ED PROVIDER NOTE - CARE PLAN
Principal Discharge DX:	Other closed fracture of right femur, unspecified portion of femur, initial encounter  Secondary Diagnosis:	Dizziness

## 2020-06-16 NOTE — H&P ADULT - NSHPPHYSICALEXAM_GEN_ALL_CORE
Vital Signs Last 24 Hrs  T(C): 36.8 (16 Jun 2020 11:48), Max: 36.8 (16 Jun 2020 11:48)  T(F): 98.3 (16 Jun 2020 11:48), Max: 98.3 (16 Jun 2020 11:48)  HR: 67 (16 Jun 2020 11:48) (67 - 67)  BP: 178/68 (16 Jun 2020 11:48) (178/68 - 178/68)-->118/72 on monitor.  RR: 18 (16 Jun 2020 11:48) (18 - 18)  SpO2: 100% (16 Jun 2020 11:48) (100% - 100%)    PHYSICAL EXAM:    GENERAL: Comfortable, no acute distress   HEAD:  Normocephalic, atraumatic  EYES: EOMI, PERRLA  HEENT: dry mucous membranes  NECK: Supple, No JVD  NERVOUS SYSTEM:  Alert & Oriented X3, grossly non focal.   CHEST/LUNG: Clear to auscultation bilaterally  HEART: Regular rate and rhythm  ABDOMEN: Soft, Nontender, Nondistended, Bowel sounds present  GENITOURINARY: Voiding, no palpable bladder  EXTREMITIES:   No clubbing, cyanosis, or edema  MUSCULOSKELETAL- RLE flexed at hip and knee, everted.  SKIN-no rash

## 2020-06-16 NOTE — ED PROVIDER NOTE - NS ED ROS FT
Constitutional: nad, well appearing  HEENT:  no nasal congestion, eye drainage or ear pain.   CVS:  no cp  Resp:  No sob, no cough  GI:  no abdominal pain, no nausea or vomiting  :  no dysuria  MSK: R thigh pain  Skin: no rash  Neuro: no change in mental status or level of consciousness  Heme/lymph: no bleeding

## 2020-06-16 NOTE — DISCHARGE NOTE PROVIDER - NSDCCPCAREPLAN_GEN_ALL_CORE_FT
PRINCIPAL DISCHARGE DIAGNOSIS  Diagnosis: Other closed fracture of right femur, unspecified portion of femur, initial encounter  Assessment and Plan of Treatment:       SECONDARY DISCHARGE DIAGNOSES  Diagnosis: Dizziness  Assessment and Plan of Treatment: PRINCIPAL DISCHARGE DIAGNOSIS  Diagnosis: Other closed fracture of right femur, unspecified portion of femur, initial encounter  Assessment and Plan of Treatment: physical therapy at rehab.   follow up with orthopedics as advised.      SECONDARY DISCHARGE DIAGNOSES  Diagnosis: MDS (myelodysplastic syndrome)  Assessment and Plan of Treatment: follow up with Dr. Kirk after discharge from rehab.    Diagnosis: Dizziness  Assessment and Plan of Treatment:

## 2020-06-16 NOTE — ED PROVIDER NOTE - CLINICAL SUMMARY MEDICAL DECISION MAKING FREE TEXT BOX
Pt with deformity to RLE.  XR reveals fx.  Will likely need medical clearance as well given presyncope.  Otherwise asymptomatic.  EKG unremarkable.  Discussed with ortho.  Discussed with Dr. D'Amico.  Pt accepted by hospitalist for further w/u and management.  Further care per inpatient treatment team.

## 2020-06-16 NOTE — ED PROVIDER NOTE - OBJECTIVE STATEMENT
84 y F pmh of htn, hl, anemia, and hypothyroid presenting  s/p fall with pain to rle.  States that she felt lightheaded and fell backwards while talking on the phone.  No LOC.  No n/v/d/f/c.  No cp/sob.  States that she gets lightheaded frequently.  Never fully syncopized.  Unable to bear weight on RLE at this time.

## 2020-06-16 NOTE — DISCHARGE NOTE PROVIDER - NSDCFUADDINST_GEN_ALL_CORE_FT
1.	Pain Control  2.	Toe touch weight bearing right lower extremity, with assistive devices as needed  3.	DVT Prophylaxis per anticoagulation team  4.	PT as needed  5.	Follow up with Dr. Salinas as Outpatient in 10-14 Days after Discharge from the Hospital or Rehab. Call Office For Appointment.  6.	Staples to be removed Post-Op Day 14, and repeat x-rays  7.	Ice/Elevate affected area as needed  8.	Keep Dressing clean and dry

## 2020-06-16 NOTE — ED ADULT NURSE NOTE - OBJECTIVE STATEMENT
Patient comes in s/p fall from standing height on aspirin. Patient states she was lightheaded and fell backwards. Patient with c/o right upper leg pain. Patient denies hitting head, - loc. TA called at triage.

## 2020-06-16 NOTE — DISCHARGE NOTE PROVIDER - NSDCMRMEDTOKEN_GEN_ALL_CORE_FT
aspirin 81 mg oral tablet: 1 tab(s) orally once a day  fenofibrate 160 mg oral tablet: 1 tab(s) orally once a day  levothyroxine 50 mcg (0.05 mg) oral tablet: 1 tab(s) orally once a day  losartan 50 mg oral tablet: 1 tab(s) orally once a day (in the evening)  rosuvastatin 20 mg oral tablet: 1 tab(s) orally once a day  sertraline 100 mg oral tablet: 1 tab(s) orally once a day acetaminophen 325 mg oral tablet: 2 tab(s) orally every 4 hours, As Needed for mild pain  aspirin 81 mg oral tablet: 1 tab(s) orally once a day  calcium-vitamin D 500 mg-200 intl units (5 mcg) oral tablet: 1 tab(s) orally once a day  cholecalciferol oral tablet: 1000 unit(s) orally once a day  enoxaparin: 40 milligram(s) subcutaneous once a day  fenofibrate 160 mg oral tablet: 1 tab(s) orally once a day  levothyroxine 50 mcg (0.05 mg) oral tablet: 1 tab(s) orally once a day  losartan 50 mg oral tablet: 1 tab(s) orally once a day (in the evening)  polyethylene glycol 3350 oral powder for reconstitution: 17 gram(s) orally once a day  rosuvastatin 20 mg oral tablet: 1 tab(s) orally once a day  sertraline 100 mg oral tablet: 1 tab(s) orally once a day  traMADol 50 mg oral tablet: 25 milligram(s) orally every 6 hours, As Needed for moderate pain, 50mg oral every 6 hours as needed for severe pain

## 2020-06-16 NOTE — PROGRESS NOTE ADULT - SUBJECTIVE AND OBJECTIVE BOX
pt seen at bedside in PACU, min pain right hip, denies N/T RLE.     PE right hip   dressing c/d/i   compartments soft non tender  FROM ankle and toes  + EHL FHL GS TA   SILT   DP intact                          7.2    7.31  )-----------( 190      ( 16 Jun 2020 21:09 )             22.2

## 2020-06-16 NOTE — PROGRESS NOTE ADULT - ASSESSMENT
A: 84F s/p Right hip IMN     P:  TTWB RLE   therapy   DVT ppx   post op abx  venodynes  incentive spirometer  pain control  2U PRBC ordered for H&H 7.2/22.2  follow labs

## 2020-06-16 NOTE — ED ADULT TRIAGE NOTE - CHIEF COMPLAINT QUOTE
Patient presents to ED complaining of fall at home. Pt endorses right leg pain. Takes aspirin daily. Denies LOC or head injury. Pt states "I was on the phone, felt dizzy and fell back". Trauma alert called @ 11:42. Dr Isaac made aware and at bedside.

## 2020-06-16 NOTE — ED PROVIDER NOTE - PMH
Acute promyelocytic leukemia    Anemia    Cataract  left eye  HLD (hyperlipidemia)    HTN (hypertension)    Hypothyroidism    Macular degeneration  right

## 2020-06-16 NOTE — CONSULT NOTE ADULT - SUBJECTIVE AND OBJECTIVE BOX
Orthopedics Consult Note:    This is a 84y Female with PMHx Promyelocytic Leukemia on chemotherapy, HTN, HLD and hypothyroidism who presents to the ED today with c/o right hip pain and inability to bear weight s/p fall today. Pt reports she was standing, talking on the phone with her son and eating breakfast when she suddenly fell backwards into a tray stand and then to the floor. Pt denies any other injuries. Pt denies head trauma or LOC. Pt denies any numbness, tingling or paresthesias. Pt is a community ambulator without use of any assistive devices at baseline.    Past Medical & Surgical History:  Other fracture of right femur, initial encounter for closed fracture  Family history of cancer in brother  Patient's mother is   Patient's father is   MEWS Score  Cataract  Macular degeneration  Hypothyroidism  HLD (hyperlipidemia)  HTN (hypertension)  Acute promyelocytic leukemia  Anemia  No pertinent past medical history  Other closed fracture of right femur, unspecified portion of femur, initial encounter  H/O lumpectomy  fall  90+  Dizziness    Allergies:  No Known Allergies    Vital Signs:  T(C): 36.8 (20 @ 11:48), Max: 36.8 (20 @ 11:48)  HR: 67 (20 @ 11:48) (67 - 67)  BP: 178/68 (20 @ 11:48) (178/68 - 178/68)  RR: 18 (20 @ 11:48) (18 - 18)  SpO2: 100% (20 @ 11:48) (100% - 100%)    Labs:                        11.6   6.67  )-----------( 219      ( 2020 11:53 )             34.6       136  |  106  |  26<H>  ----------------------------<  178<H>  4.0   |  26  |  0.76    Ca    8.5      2020 11:53    TPro  6.8  /  Alb  3.4  /  TBili  0.4  /  DBili  x   /  AST  26  /  ALT  37  /  AlkPhos  39<L>      PT/INR - ( 2020 11:53 )   PT: 13.0 sec;   INR: 1.17 ratio         PTT - ( 2020 11:53 )  PTT:32.4 sec    Imaging:  X-rays of the pelvis, right hip and femur demonstrate a subtrochanteric hip fracture.    PE right hip:  +mild swelling, no ecchymosis, no erythema, skin intact, normothermic. +TTP over groin. LROM 2' pain. Moving ankle and all toes, +EHL/FHL/TA/GS. SILT throughout. DP and PT pulses 2+.    Secondary Survey:  Right knee, right ankle, LLE and B/L UEs with no swelling, no ecchymoses, no abrasions, no lacerations or any other signs of injury with full painless baseline ROM and no bony TTP. Able to SLR LLE. No pain with axial loading or log roll LLE. Sensation intact distally, moving all digits. Distal pulses intact.     Spine and ribs with no bony TTP.     Assessment:  84y Female with a right subtrochanteric hip fracture s/p fall today.    Plan:  Pt advised that surgical intervention for right hip fracture with IMN is necessary.  Surgical procedure discussed in detail with pt and family including all R/B/As; Pt expressed understanding and consents for surgery obtained.  Admit to Medical service for optimization and medical clearance.  f/u medical clearance.  f/u labs/imaging.  Complete bedrest.  Pain control.  Ice application.  NPO and hold chemical anticoagulation now for possible OR today pending medical optimization and clearance.   DVT ppx with SCDs for now.    Case discussed with Dr. Salinas who agrees with plan.

## 2020-06-16 NOTE — H&P ADULT - NSHPLABSRESULTS_GEN_ALL_CORE
LABS:                        11.6   6.67  )-----------( 219      ( 16 Jun 2020 11:53 )             34.6     06-16    136  |  106  |  26<H>  ----------------------------<  178<H>  4.0   |  26  |  0.76    Ca    8.5      16 Jun 2020 11:53    TPro  6.8  /  Alb  3.4  /  TBili  0.4  /  DBili  x   /  AST  26  /  ALT  37  /  AlkPhos  39<L>  06-16    PT/INR - ( 16 Jun 2020 11:53 )   PT: 13.0 sec;   INR: 1.17 ratio         PTT - ( 16 Jun 2020 11:53 )  PTT:32.4 sec      CARDIAC MARKERS ( 16 Jun 2020 11:53 )  <0.015 ng/mL / x     / 136 U/L / x     / x          EKG: sinus, no acute ischemic changes

## 2020-06-16 NOTE — ED ADULT NURSE NOTE - NSIMPLEMENTINTERV_GEN_ALL_ED
Implemented All Fall with Harm Risk Interventions:  [place] to call system. Call bell, personal items and telephone within reach. Instruct patient to call for assistance. Room bathroom lighting operational. Non-slip footwear when patient is off stretcher. Physically safe environment: no spills, clutter or unnecessary equipment. Stretcher in lowest position, wheels locked, appropriate side rails in place. Provide visual cue, wrist band, yellow gown, etc. Monitor gait and stability. Monitor for mental status changes and reorient to person, place, and time. Review medications for side effects contributing to fall risk. Reinforce activity limits and safety measures with patient and family. Provide visual clues: red socks.

## 2020-06-17 LAB
24R-OH-CALCIDIOL SERPL-MCNC: 28.8 NG/ML — LOW (ref 30–80)
ANION GAP SERPL CALC-SCNC: 8 MMOL/L — SIGNIFICANT CHANGE UP (ref 5–17)
BUN SERPL-MCNC: 34 MG/DL — HIGH (ref 7–23)
CALCIUM SERPL-MCNC: 7.5 MG/DL — LOW (ref 8.5–10.1)
CHLORIDE SERPL-SCNC: 107 MMOL/L — SIGNIFICANT CHANGE UP (ref 96–108)
CO2 SERPL-SCNC: 23 MMOL/L — SIGNIFICANT CHANGE UP (ref 22–31)
CREAT SERPL-MCNC: 0.96 MG/DL — SIGNIFICANT CHANGE UP (ref 0.5–1.3)
GLUCOSE SERPL-MCNC: 194 MG/DL — HIGH (ref 70–99)
HCT VFR BLD CALC: 30.4 % — LOW (ref 34.5–45)
HGB BLD-MCNC: 10.2 G/DL — LOW (ref 11.5–15.5)
MCHC RBC-ENTMCNC: 32 PG — SIGNIFICANT CHANGE UP (ref 27–34)
MCHC RBC-ENTMCNC: 33.6 GM/DL — SIGNIFICANT CHANGE UP (ref 32–36)
MCV RBC AUTO: 95.3 FL — SIGNIFICANT CHANGE UP (ref 80–100)
PLATELET # BLD AUTO: 140 K/UL — LOW (ref 150–400)
POTASSIUM SERPL-MCNC: 4.2 MMOL/L — SIGNIFICANT CHANGE UP (ref 3.5–5.3)
POTASSIUM SERPL-SCNC: 4.2 MMOL/L — SIGNIFICANT CHANGE UP (ref 3.5–5.3)
RBC # BLD: 3.19 M/UL — LOW (ref 3.8–5.2)
RBC # FLD: 13.7 % — SIGNIFICANT CHANGE UP (ref 10.3–14.5)
SODIUM SERPL-SCNC: 138 MMOL/L — SIGNIFICANT CHANGE UP (ref 135–145)
WBC # BLD: 6.52 K/UL — SIGNIFICANT CHANGE UP (ref 3.8–10.5)
WBC # FLD AUTO: 6.52 K/UL — SIGNIFICANT CHANGE UP (ref 3.8–10.5)

## 2020-06-17 PROCEDURE — 99223 1ST HOSP IP/OBS HIGH 75: CPT

## 2020-06-17 PROCEDURE — 99233 SBSQ HOSP IP/OBS HIGH 50: CPT

## 2020-06-17 RX ADMIN — Medication 100 MILLIGRAM(S): at 06:24

## 2020-06-17 RX ADMIN — Medication 50 MICROGRAM(S): at 06:21

## 2020-06-17 RX ADMIN — Medication 100 MILLIGRAM(S): at 01:07

## 2020-06-17 RX ADMIN — Medication 1 TABLET(S): at 10:57

## 2020-06-17 RX ADMIN — ENOXAPARIN SODIUM 40 MILLIGRAM(S): 100 INJECTION SUBCUTANEOUS at 06:21

## 2020-06-17 RX ADMIN — FAMOTIDINE 20 MILLIGRAM(S): 10 INJECTION INTRAVENOUS at 19:03

## 2020-06-17 RX ADMIN — Medication 975 MILLIGRAM(S): at 21:33

## 2020-06-17 RX ADMIN — POLYETHYLENE GLYCOL 3350 17 GRAM(S): 17 POWDER, FOR SOLUTION ORAL at 10:57

## 2020-06-17 RX ADMIN — FAMOTIDINE 20 MILLIGRAM(S): 10 INJECTION INTRAVENOUS at 06:21

## 2020-06-17 RX ADMIN — LOSARTAN POTASSIUM 50 MILLIGRAM(S): 100 TABLET, FILM COATED ORAL at 10:57

## 2020-06-17 RX ADMIN — Medication 975 MILLIGRAM(S): at 06:21

## 2020-06-17 RX ADMIN — Medication 145 MILLIGRAM(S): at 10:57

## 2020-06-17 RX ADMIN — ATORVASTATIN CALCIUM 80 MILLIGRAM(S): 80 TABLET, FILM COATED ORAL at 21:33

## 2020-06-17 NOTE — PROGRESS NOTE ADULT - SUBJECTIVE AND OBJECTIVE BOX
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 She woke up feeling fine, hadn't eaten breakfast yet, when she made a phone call to get her car inspected. While on the telephone, she thinks she either lost her balance or may have felt dizzy and she fell backwards injuring her right hip. No LOC. She was subsequently unable to get up and brought to the ER. In ED found to have right subtrochanteric hip fracture. She currently feels ok. No pain at rest. No sob/chest pain. no recent f/c/r. Was eating and drinking as usual.   Was supposed to have cataract surgery next month, so chemo schedule was being adjusted.   She exercises regularly (walks a mile daily). No difficulty climbing a flight of stairs prior to fall.   No h/o heart disease/stroke. no prior reactions to anesthesia.    ROS: all 10 systems reviewed and is as above otherwise negative.     PMH: as above  PSH: left breast mastectomy  F/H; brother-colon ca, father-spinal ca, other-breast and bone ca  Social: No tobacco/etoh (16 Jun 2020 13:36)    6/17/20- feels dizzy and lightheaded today    Review of system- All 10 systems reviewed and is as per HPI otherwise negative.     T(C): 37 (06-17-20 @ 09:10), Max: 37.6 (06-16-20 @ 15:05)  HR: 95 (06-17-20 @ 09:10) (68 - 95)  BP: 117/47 (06-17-20 @ 09:10) (82/33 - 148/50)  RR: 16 (06-17-20 @ 09:10) (12 - 17)  SpO2: 97% (06-17-20 @ 09:10) (95% - 100%)  Wt(kg): --    LABS:                        10.2   6.52  )-----------( 140      ( 17 Jun 2020 06:41 )             30.4     06-17    138  |  107  |  34<H>  ----------------------------<  194<H>  4.2   |  23  |  0.96    Ca    7.5<L>      17 Jun 2020 06:41    TPro  x   /  Alb  2.2<L>  /  TBili  x   /  DBili  x   /  AST  x   /  ALT  x   /  AlkPhos  x   06-16    PT/INR - ( 16 Jun 2020 11:53 )   PT: 13.0 sec;   INR: 1.17 ratio       PTT - ( 16 Jun 2020 11:53 )  PTT:32.4 sec    POCT Blood Glucose.: 168 mg/dL (16 Jun 2020 11:51)  CARDIAC MARKERS ( 16 Jun 2020 11:53 )  <0.015 ng/mL / x     / 136 U/L / x     / x        RADIOLOGY & ADDITIONAL TESTS:      PHYSICAL EXAM:  GENERAL: NAD, well-groomed, well-developed  HEAD:  Atraumatic, Normocephalic  EYES: EOMI, PERRLA, conjunctiva and sclera clear  HEENT: Moist mucous membranes  NECK: Supple, No JVD  NERVOUS SYSTEM:  Alert & Oriented X3, Motor Strength 5/5 B/L upper and lower extremities; DTRs 2+ intact and symmetric  CHEST/LUNG: Clear to auscultation bilaterally; No rales, rhonchi, wheezing, or rubs  HEART: Regular rate and rhythm; No murmurs, rubs, or gallops  ABDOMEN: Soft, Nontender, Nondistended; Bowel sounds present  GENITOURINARY- Voiding, no palpable bladder  EXTREMITIES:  2+ Peripheral Pulses, No clubbing, cyanosis, or edema  MUSCULOSKELTAL- RT hip dressing dry  SKIN-no rash, no lesion  CNS- alert, oriented X3, non focal     Daily Height in cm: 152.4 (16 Jun 2020 15:35)      MEDICATIONS  (STANDING):  acetaminophen   Tablet .. 975 milliGRAM(s) Oral every 8 hours  atorvastatin 80 milliGRAM(s) Oral at bedtime  calcium carbonate 1250 mG  + Vitamin D (OsCal 500 + D) 1 Tablet(s) Oral daily  enoxaparin Injectable 40 milliGRAM(s) SubCutaneous every 24 hours  famotidine    Tablet 20 milliGRAM(s) Oral every 12 hours  fenofibrate Tablet 145 milliGRAM(s) Oral daily  lactated ringers. 1000 milliLiter(s) (75 mL/Hr) IV Continuous <Continuous>  levothyroxine 50 MICROGram(s) Oral daily  losartan 50 milliGRAM(s) Oral daily  polyethylene glycol 3350 17 Gram(s) Oral daily    MEDICATIONS  (PRN):  melatonin 3 milliGRAM(s) Oral at bedtime PRN Insomnia  morphine  - Injectable 2 milliGRAM(s) SubCutaneous every 4 hours PRN Severe Pain (7 - 10)  ondansetron Injectable 4 milliGRAM(s) IV Push every 6 hours PRN Nausea and/or Vomiting  oxyCODONE    IR 5 milliGRAM(s) Oral every 4 hours PRN Moderate Pain (4 - 6)    Assessment/Plan  #S/p mechanical Fall with Right hip fracture s/p IMN  #Mild anemia acute blood loss from fracture/postop  Ortho eval appreciated  PT as tolerated  Pain meds prn  Monitor HH  AC by Lovenox x4 weeks  Bowel regimen  Incentive spirometry    #H/o High grade MDS with high risk of progression to AML:  On f/u DR Kirk  Patient states her next chemo is on 06/26    3. HTN:  -continue arb    4. HLD:  -statin/fenofibrate    5. Depression:  -sertraline.     6. Hypothyroid:  -synthroid    7. DVT px- lovenox x4 weeks    #Dispo- continue PT, likely [facility] vs home PT depending on progress. D/w pt and ortho team

## 2020-06-17 NOTE — PROGRESS NOTE ADULT - SUBJECTIVE AND OBJECTIVE BOX
Pt seen and examined at bedside, resting comfortably. No acute events overnight. Denies numbness/tingling, chest pain, SOB.    T(C): 36.3 (06-17-20 @ 05:00), Max: 37.6 (06-16-20 @ 15:05)  HR: 77 (06-17-20 @ 05:00) (67 - 79)  BP: 126/61 (06-17-20 @ 05:00) (82/33 - 178/68)  RR: 16 (06-17-20 @ 05:00) (12 - 18)  SpO2: 97% (06-17-20 @ 05:00) (95% - 100%)                          7.2    7.31  )-----------( 190      ( 16 Jun 2020 21:09 )             22.2     16 Jun 2020 21:09    138    |  109    |  30     ----------------------------<  245    4.5     |  20     |  0.93     Ca    7.6        16 Jun 2020 21:09    TPro  x      /  Alb  2.2    /  TBili  x      /  DBili  x      /  AST  x      /  ALT  x      /  AlkPhos  x      16 Jun 2020 21:09      Physical Exam:  Gen: NAD, A&Ox3    RLE:  Skin intact, dressing C/D/I  SILT L2-S1  + EHL/FHL/TA/GSC  + DP/PT pulses  Compartments soft, compressible    A/P: 84yFemale s/p R femur IMN POD1    - Pain control  - PT/OT  - TTWB RLE  - DVT prophylaxis to start today per AC team  - Encourage incentive spirometry, deep breathing exercises  - Will discuss with attending, Dr. Salinas and advise if plan changes    Alin Jimenez, DO PGY1  Orthopaedic Surgery

## 2020-06-17 NOTE — CONSULT NOTE ADULT - SUBJECTIVE AND OBJECTIVE BOX
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 She woke up feeling fine, hadn't eaten breakfast yet, when she made a phone call to get her car inspected. While on the telephone, she thinks she either lost her balance or may have felt dizzy and she fell backwards injuring her right hip. No LOC. She was subsequently unable to get up and brought to the ER. In ED found to have right subtrochanteric hip fracture. She currently feels ok. No pain at rest. No sob/chest pain. no recent f/c/r. Was eating and drinking as usual.   Was supposed to have cataract surgery next month, so chemo schedule was being adjusted.   She exercises regularly (walks a mile daily). No difficulty climbing a flight of stairs prior to fall.   No h/o heart disease/stroke. no prior reactions to anesthesia.      Patient is a 84y old  Female who presents with a chief complaint of right hip fracture (2020 13:36)      Consulted by Dr. Pancho Salinas  for VTE prophylaxis, risk stratification, and anticoagulation management.    PAST MEDICAL & SURGICAL HISTORY:  Cataract: left eye  Macular degeneration: right  Hypothyroidism  HLD (hyperlipidemia)  HTN (hypertension)  Acute promyelocytic leukemia  Anemia  H/O lumpectomy: Left side in , performed by Dr. Samuels at   Interval Note:  2020 Pt seen at bedside on [facility].  Discussed her anticoagulation with Lovenox for a total of four weeks post her procedure.  Pt states she knows how to give self inj if discharged home.  Questions answered risks and benefits discussed.  Pt wants to go home but she lives alone.      FAMILY HISTORY:  Family history of cancer in brother: Colon cancer  Patient's mother is : Hx of breast and bone cancer  Patient's father is : Hx of spine cancer    CAPRINI SCORE  AGE RELATED RISK FACTORS                                                       MOBILITY RELATED FACTORS  [ ] Age 41-60 years                                            (1 Point)                  [ ] Bed rest                                                        (1 Point)  [ ] Age: 61-74 years                                           (2 Points)                [ ] Plaster cast                                                   (2 Points)  [x ] Age= 75 years                                              (3 Points)                 [ ] Bed bound for more than 72 hours                   (2 Points)    DISEASE RELATED RISK FACTORS                                               GENDER SPECIFIC FACTORS  [ ] Edema in the lower extremities                       (1 Point)           [ ] Pregnancy                                                            (1 Point)  [ ] Varicose veins                                               (1 Point)                  [ ] Post-partum < 6 weeks                                      (1 Point)             [ ] BMI > 25 Kg/m2                                            (1 Point)                  [ ] Hormonal therapy or oral contraception       (1 Point)                 [ ] Sepsis (in the previous month)                        (1 Point)             [ ] History of pregnancy complications                (1Point)  [ ] Pneumonia or serious lung disease                                             [ ] Unexplained or recurrent  (=/>3), premature                                 (In the previous month)                               (1 Point)                birth with toxemia or growth-restricted infant (1 Point)  [ ] Abnormal pulmonary function test            (1 Point)                                   SURGERY RELATED RISK FACTORS  [ ] Acute myocardial infarction                       (1 Point)                  [ ]  Section                                         (1 Point)  [ ] Congestive heart failure (in the previous month) (1 Point)   [ ] Minor surgery   lasting <45 minutes       (1 Point)   [ ] Inflammatory bowel disease                             (1 Point)          [ ] Arthroscopic surgery                                  (2 Points)  [ ] Central venous access                                    (2 Points)            [ x] General surgery lasting >45 minutes      (2 Points)       [ ] Stroke (in the previous month)                  (5 Points)            [ ] Elective major lower extremity arthroplasty (5 Points)                                   [ x ] Malignancy (present or past include skin melanoma                                          but exclude  basal skin cell)    (2 points)                                      TRAUMA RELATED RISK FACTORS                HEMATOLOGY RELATED FACTORS                                  [ x] Fracture of the hip, pelvis, or leg                       (5 Points)  [ ] Prior episodes of VTE                                     (3 Points)          [ ] Acute spinal cord injury (in the previous month)  (5 Points)  [ ] Positive family history for VTE                         (3 Points)       [ ] Paralysis (less than 1 month)                          (5 Points)  [ ] Prothrombin 57481 A                                      (3 Points)         [ ] Multiple Trauma (within 1month)                 (5Points)                                                                                                                                                                [ ] Factor V Leiden                                          (3 Points)                                OTHER RISK FACTORS                          [ ] Lupus anticoagulants                                     (3 Points)                       [ ] BMI > 40                          (1 Point)                                                         [ ] Anticardiolipin antibodies                                (3 Points)                   [ ] Smoking                              (1Point)                                                [ ] High homocysteine in the blood                      (3 Points)                [  ] Diabetes requiring insulin (1point)                         [ ] Other congenital or acquired thrombophilia       (3 Points)          [  ] Chemotherapy                   (1 Point)  [ ] Heparin induced thrombocytopenia                  (3 Points)             [  ] Blood Transfusion                (1 point)                                                                                                             Total Score [ 10 ]                                                                                                                                                                                                                                                                                                                                                                                                                                         IMPROVE Bleeding Risk Score    Falls Risk:   High ( x )  Mod (  )  Low (  )    Interval Note    EBL:  400ml  crcl 38.2 cr .96 bmi 23.3        Denies any personal or familial history of clotting or bleeding disorders.    Allergies    No Known Allergies    Intolerances        REVIEW OF SYSTEMS    (  )Fever	     (  )Constipation	(  )SOB				(  )Headache	(  )Dysuria  (  )Chills	     (  )Melena	(  )Dyspnea present on exertion	                    (  )Dizziness                    (  )Polyuria  (  )Nausea	     (  )Hematochezia	(  )Cough			                    (  )Syncope   	(  )Hematuria  (  )Vomiting    (  )Chest Pain	(  )Wheezing			(x  )Weakness  (x) pain  (  )Diarrhea     (  )Palpitations	(  )Anorexia			(  )Myalgia    Pertinent positives in HPI and daily subjective.  All other ROS negative.    Vital Signs Last 24 Hrs  T(C): 37 (2020 09:10), Max: 37.6 (2020 15:05)  T(F): 98.6 (2020 09:10), Max: 99.6 (2020 15:05)  HR: 95 (2020 09:10) (68 - 95)  BP: 117/47 (2020 09:10) (82/33 - 148/50)  BP(mean): 65 (2020 09:10) (65 - 65)  RR: 16 (2020 09:10) (12 - 17)  SpO2: 97% (2020 09:10) (95% - 100%)    PHYSICAL EXAM:    Constitutional: Appears Well    Neurological: A& O x 3    Skin: Warm    Respiratory and Thorax: normal effort; Breath sounds: normal; No rales/wheezing/rhonchi  	  Cardiovascular: S1, S2, regular, NMBR	    Gastrointestinal: BS + x 4Q, nontender	    Genitourinary:  Bladder nondistended, nontender    Musculoskeletal:   General Right:   no muscle/joint tenderness,   normal tone, no joint swelling,   ROM: limited	    General Left:   no muscle/joint tenderness,   normal tone, no joint swelling,   ROM: full    Hip:  Right: Dressing CDI    Lower extrems:   Right: no calf tenderness              negative giorgio's sign               + pedal pulses    Left:   no calf tenderness              negative giorgio's sign               + pedal pulses                          10.2   6.52  )-----------( 140      ( 2020 06:41 )             30.4           138  |  107  |  34<H>  ----------------------------<  194<H>  4.2   |  23  |  0.96    Ca    7.5<L>      2020 06:41    TPro  x   /  Alb  2.2<L>  /  TBili  x   /  DBili  x   /  AST  x   /  ALT  x   /  AlkPhos  x         PT/INR - ( 2020 11:53 )   PT: 13.0 sec;   INR: 1.17 ratio         PTT - ( 2020 11:53 )  PTT:32.4 sec				    MEDICATIONS  (STANDING):  acetaminophen   Tablet .. 975 milliGRAM(s) Oral every 8 hours  atorvastatin 80 milliGRAM(s) Oral at bedtime  calcium carbonate 1250 mG  + Vitamin D (OsCal 500 + D) 1 Tablet(s) Oral daily  enoxaparin Injectable 40 milliGRAM(s) SubCutaneous every 24 hours  famotidine    Tablet 20 milliGRAM(s) Oral every 12 hours  fenofibrate Tablet 145 milliGRAM(s) Oral daily  lactated ringers. 1000 milliLiter(s) IV Continuous <Continuous>  levothyroxine 50 MICROGram(s) Oral daily  losartan 50 milliGRAM(s) Oral daily  polyethylene glycol 3350 17 Gram(s) Oral daily          DVT Prophylaxis:  LMWH                   (  )  Heparin SQ           (  )  Coumadin             (  )  Xarelto                  (  )  Eliquis                   (  )  Venodynes           (  )  Ambulation          (  )  UFH                       (  )  Contraindicated  (  )  EC Aspirin             (  )

## 2020-06-17 NOTE — CONSULT NOTE ADULT - ASSESSMENT
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 Pt now s/p right femur IMN  on 6-.  Pt has high thrombosis risk and requires anticoagulation prophylaxis.     6- Discussed with pt the use of lovenox as her anticoagulation medication and she is welling to us it.       Plan:  :Lovenox 40mg sq daily for four weeks post procedure  :daily cbc/bmp  :LE Venodynes  : increase mobility as tolerated  :Thanks for consult will f/u

## 2020-06-17 NOTE — PHYSICAL THERAPY INITIAL EVALUATION ADULT - MODALITIES TREATMENT COMMENTS
Pt returned to supine w/ /97, Pt unable to stand today due to weakness and dizziness, denies hip pain at this time, Nursing aide attending to pt at end of session for bed pan. RN aware of attempt, pt resting comfortable

## 2020-06-18 LAB
ANION GAP SERPL CALC-SCNC: 8 MMOL/L — SIGNIFICANT CHANGE UP (ref 5–17)
BUN SERPL-MCNC: 32 MG/DL — HIGH (ref 7–23)
CALCIUM SERPL-MCNC: 7.2 MG/DL — LOW (ref 8.5–10.1)
CHLORIDE SERPL-SCNC: 102 MMOL/L — SIGNIFICANT CHANGE UP (ref 96–108)
CO2 SERPL-SCNC: 23 MMOL/L — SIGNIFICANT CHANGE UP (ref 22–31)
CREAT SERPL-MCNC: 0.96 MG/DL — SIGNIFICANT CHANGE UP (ref 0.5–1.3)
GLUCOSE SERPL-MCNC: 227 MG/DL — HIGH (ref 70–99)
HCT VFR BLD CALC: 20.5 % — CRITICAL LOW (ref 34.5–45)
HCT VFR BLD CALC: 21.9 % — LOW (ref 34.5–45)
HGB BLD-MCNC: 7.1 G/DL — LOW (ref 11.5–15.5)
HGB BLD-MCNC: 7.7 G/DL — LOW (ref 11.5–15.5)
MCHC RBC-ENTMCNC: 32.1 PG — SIGNIFICANT CHANGE UP (ref 27–34)
MCHC RBC-ENTMCNC: 32.7 PG — SIGNIFICANT CHANGE UP (ref 27–34)
MCHC RBC-ENTMCNC: 34.6 GM/DL — SIGNIFICANT CHANGE UP (ref 32–36)
MCHC RBC-ENTMCNC: 35.2 GM/DL — SIGNIFICANT CHANGE UP (ref 32–36)
MCV RBC AUTO: 91.3 FL — SIGNIFICANT CHANGE UP (ref 80–100)
MCV RBC AUTO: 94.5 FL — SIGNIFICANT CHANGE UP (ref 80–100)
PLATELET # BLD AUTO: 102 K/UL — LOW (ref 150–400)
PLATELET # BLD AUTO: 120 K/UL — LOW (ref 150–400)
POTASSIUM SERPL-MCNC: 3.8 MMOL/L — SIGNIFICANT CHANGE UP (ref 3.5–5.3)
POTASSIUM SERPL-SCNC: 3.8 MMOL/L — SIGNIFICANT CHANGE UP (ref 3.5–5.3)
RBC # BLD: 2.17 M/UL — LOW (ref 3.8–5.2)
RBC # BLD: 2.4 M/UL — LOW (ref 3.8–5.2)
RBC # FLD: 14.3 % — SIGNIFICANT CHANGE UP (ref 10.3–14.5)
RBC # FLD: 14.5 % — SIGNIFICANT CHANGE UP (ref 10.3–14.5)
SODIUM SERPL-SCNC: 133 MMOL/L — LOW (ref 135–145)
WBC # BLD: 3.93 K/UL — SIGNIFICANT CHANGE UP (ref 3.8–10.5)
WBC # BLD: 5.26 K/UL — SIGNIFICANT CHANGE UP (ref 3.8–10.5)
WBC # FLD AUTO: 3.93 K/UL — SIGNIFICANT CHANGE UP (ref 3.8–10.5)
WBC # FLD AUTO: 5.26 K/UL — SIGNIFICANT CHANGE UP (ref 3.8–10.5)

## 2020-06-18 PROCEDURE — 99233 SBSQ HOSP IP/OBS HIGH 50: CPT

## 2020-06-18 PROCEDURE — 99231 SBSQ HOSP IP/OBS SF/LOW 25: CPT

## 2020-06-18 RX ORDER — CHOLECALCIFEROL (VITAMIN D3) 125 MCG
1000 CAPSULE ORAL DAILY
Refills: 0 | Status: DISCONTINUED | OUTPATIENT
Start: 2020-06-18 | End: 2020-06-20

## 2020-06-18 RX ADMIN — Medication 1000 UNIT(S): at 14:11

## 2020-06-18 RX ADMIN — Medication 50 MICROGRAM(S): at 05:06

## 2020-06-18 RX ADMIN — Medication 145 MILLIGRAM(S): at 14:12

## 2020-06-18 RX ADMIN — Medication 975 MILLIGRAM(S): at 05:06

## 2020-06-18 RX ADMIN — FAMOTIDINE 20 MILLIGRAM(S): 10 INJECTION INTRAVENOUS at 22:40

## 2020-06-18 RX ADMIN — Medication 1 TABLET(S): at 10:30

## 2020-06-18 RX ADMIN — ATORVASTATIN CALCIUM 80 MILLIGRAM(S): 80 TABLET, FILM COATED ORAL at 22:40

## 2020-06-18 RX ADMIN — POLYETHYLENE GLYCOL 3350 17 GRAM(S): 17 POWDER, FOR SOLUTION ORAL at 10:30

## 2020-06-18 RX ADMIN — FAMOTIDINE 20 MILLIGRAM(S): 10 INJECTION INTRAVENOUS at 05:06

## 2020-06-18 RX ADMIN — Medication 975 MILLIGRAM(S): at 22:40

## 2020-06-18 RX ADMIN — ENOXAPARIN SODIUM 40 MILLIGRAM(S): 100 INJECTION SUBCUTANEOUS at 05:06

## 2020-06-18 RX ADMIN — Medication 975 MILLIGRAM(S): at 14:11

## 2020-06-18 RX ADMIN — OXYCODONE HYDROCHLORIDE 5 MILLIGRAM(S): 5 TABLET ORAL at 03:06

## 2020-06-18 NOTE — DISCHARGE NOTE NURSING/CASE MANAGEMENT/SOCIAL WORK - PATIENT PORTAL LINK FT
You can access the FollowMyHealth Patient Portal offered by [facility] by registering at the following website: [email or web address]. By joining [employer]’s FollowMyHealth portal, you will also be able to view your health information using other applications (apps) compatible with our system.

## 2020-06-18 NOTE — PROGRESS NOTE ADULT - SUBJECTIVE AND OBJECTIVE BOX
Pt S/E at bedside, no acute events overnight, pain controlled, denies SOB, CP, N, V, numbness, tingling, fevers, chills.     Vital Signs Last 24 Hrs  T(C): 37.1 (18 Jun 2020 05:15), Max: 37.8 (17 Jun 2020 19:55)  T(F): 98.7 (18 Jun 2020 05:15), Max: 100.1 (17 Jun 2020 19:55)  HR: 70 (18 Jun 2020 05:15) (70 - 95)  BP: 103/39 (18 Jun 2020 05:15) (103/39 - 117/47)  BP(mean): 65 (17 Jun 2020 09:10) (65 - 65)  RR: 16 (18 Jun 2020 05:15) (16 - 18)  SpO2: 93% (18 Jun 2020 05:15) (93% - 97%)    Gen: NAD,    Right Lower Extremity:  Dressing clean dry intact  +EHL/FHL/TA/GS  SILT L3-S1  +DP/PT Pulses  Compartments soft  No calf TTP B/L

## 2020-06-18 NOTE — PROGRESS NOTE ADULT - ASSESSMENT
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 Pt now s/p right femur IMN  on 6-.  Pt has high thrombosis risk and requires anticoagulation prophylaxis.     6- Discussed with pt the use of lovenox as her anticoagulation medication and she is welling to us it.       Plan:  :Lovenox 40mg sq daily for four weeks post procedure  :daily cbc/bmp noted low h/h 7.1/ 20.5  will receive 2units of PRBC today.  :LE Venodynes  : increase mobility as tolerated   will f/u

## 2020-06-18 NOTE — DISCHARGE NOTE NURSING/CASE MANAGEMENT/SOCIAL WORK - NSTRANSFERDENTURES_GEN_A_NUR
upper
You can access the FollowMyHealth Patient Portal offered by [facility] by registering at the following website: [email or web address]. By joining [employer]’s FollowMyHealth portal, you will also be able to view your health information using other applications (apps) compatible with our system.

## 2020-06-18 NOTE — PROGRESS NOTE ADULT - SUBJECTIVE AND OBJECTIVE BOX
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 She woke up feeling fine, hadn't eaten breakfast yet, when she made a phone call to get her car inspected. While on the telephone, she thinks she either lost her balance or may have felt dizzy and she fell backwards injuring her right hip. No LOC. She was subsequently unable to get up and brought to the ER. In ED found to have right subtrochanteric hip fracture. She currently feels ok. No pain at rest. No sob/chest pain. no recent f/c/r. Was eating and drinking as usual.   Was supposed to have cataract surgery next month, so chemo schedule was being adjusted.   She exercises regularly (walks a mile daily). No difficulty climbing a flight of stairs prior to fall.   No h/o heart disease/stroke. no prior reactions to anesthesia.      Patient is a 84y old  Female who presents with a chief complaint of right hip fracture (2020 13:36)      Consulted by Dr. Pancho Salinas  for VTE prophylaxis, risk stratification, and anticoagulation management.    PAST MEDICAL & SURGICAL HISTORY:  Cataract: left eye  Macular degeneration: right  Hypothyroidism  HLD (hyperlipidemia)  HTN (hypertension)  Acute promyelocytic leukemia  Anemia  H/O lumpectomy: Left side in , performed by Dr. Samuels at   Interval Note:  2020 Pt seen at bedside on [facility].  Discussed her anticoagulation with Lovenox for a total of four weeks post her procedure.  Pt states she knows how to give self inj if discharged home.  Questions answered risks and benefits discussed.  Pt wants to go home but she lives alone.    2020 Pt seen on [facility] oob in chair.  Discussed her anticoagulation with Lovenox  No concerns Pt states she is now going to rehab not sure which one. States her son will take care of it.    FAMILY HISTORY:  Family history of cancer in brother: Colon cancer  Patient's mother is : Hx of breast and bone cancer  Patient's father is : Hx of spine cancer    CAPRINI SCORE  AGE RELATED RISK FACTORS                                                       MOBILITY RELATED FACTORS  [ ] Age 41-60 years                                            (1 Point)                  [ ] Bed rest                                                        (1 Point)  [ ] Age: 61-74 years                                           (2 Points)                [ ] Plaster cast                                                   (2 Points)  [x ] Age= 75 years                                              (3 Points)                 [ ] Bed bound for more than 72 hours                   (2 Points)    DISEASE RELATED RISK FACTORS                                               GENDER SPECIFIC FACTORS  [ ] Edema in the lower extremities                       (1 Point)           [ ] Pregnancy                                                            (1 Point)  [ ] Varicose veins                                               (1 Point)                  [ ] Post-partum < 6 weeks                                      (1 Point)             [ ] BMI > 25 Kg/m2                                            (1 Point)                  [ ] Hormonal therapy or oral contraception       (1 Point)                 [ ] Sepsis (in the previous month)                        (1 Point)             [ ] History of pregnancy complications                (1Point)  [ ] Pneumonia or serious lung disease                                             [ ] Unexplained or recurrent  (=/>3), premature                                 (In the previous month)                               (1 Point)                birth with toxemia or growth-restricted infant (1 Point)  [ ] Abnormal pulmonary function test            (1 Point)                                   SURGERY RELATED RISK FACTORS  [ ] Acute myocardial infarction                       (1 Point)                  [ ]  Section                                         (1 Point)  [ ] Congestive heart failure (in the previous month) (1 Point)   [ ] Minor surgery   lasting <45 minutes       (1 Point)   [ ] Inflammatory bowel disease                             (1 Point)          [ ] Arthroscopic surgery                                  (2 Points)  [ ] Central venous access                                    (2 Points)            [ x] General surgery lasting >45 minutes      (2 Points)       [ ] Stroke (in the previous month)                  (5 Points)            [ ] Elective major lower extremity arthroplasty (5 Points)                                   [ x ] Malignancy (present or past include skin melanoma                                          but exclude  basal skin cell)    (2 points)                                      TRAUMA RELATED RISK FACTORS                HEMATOLOGY RELATED FACTORS                                  [ x] Fracture of the hip, pelvis, or leg                       (5 Points)  [ ] Prior episodes of VTE                                     (3 Points)          [ ] Acute spinal cord injury (in the previous month)  (5 Points)  [ ] Positive family history for VTE                         (3 Points)       [ ] Paralysis (less than 1 month)                          (5 Points)  [ ] Prothrombin 60152 A                                      (3 Points)         [ ] Multiple Trauma (within 1month)                 (5Points)                                                                                                                                                                [ ] Factor V Leiden                                          (3 Points)                                OTHER RISK FACTORS                          [ ] Lupus anticoagulants                                     (3 Points)                       [ ] BMI > 40                          (1 Point)                                                         [ ] Anticardiolipin antibodies                                (3 Points)                   [ ] Smoking                              (1Point)                                                [ ] High homocysteine in the blood                      (3 Points)                [  ] Diabetes requiring insulin (1point)                         [ ] Other congenital or acquired thrombophilia       (3 Points)          [  ] Chemotherapy                   (1 Point)  [ ] Heparin induced thrombocytopenia                  (3 Points)             [  ] Blood Transfusion                (1 point)                                                                                                             Total Score [ 10 ]                                                                                                                                                                                                                                                                                                                                                                                                                                         IMPROVE Bleeding Risk Score    Falls Risk:   High ( x )  Mod (  )  Low (  )    Interval Note    EBL:  400ml  crcl 38.2 cr .96 bmi 23.3        Denies any personal or familial history of clotting or bleeding disorders.    Allergies    No Known Allergies    Intolerances        REVIEW OF SYSTEMS    (  )Fever	     (  )Constipation	(  )SOB				(  )Headache	(  )Dysuria  (  )Chills	     (  )Melena	(  )Dyspnea present on exertion	                    (  )Dizziness                    (  )Polyuria  (  )Nausea	     (  )Hematochezia	(  )Cough			                    (  )Syncope   	(  )Hematuria  (  )Vomiting    (  )Chest Pain	(  )Wheezing			(x  )Weakness  (x) pain  (  )Diarrhea     (  )Palpitations	(  )Anorexia			(  )Myalgia    Pertinent positives in HPI and daily subjective.  All other ROS negative.    Vital Signs Last 24 Hrs  T(C): 36.8 (-18-20 @ 12:55), Max: 37.8 (17-20 @ 19:55)  T(F): 98.2 (-18-20 @ 12:55), Max: 100.1 (-17-20 @ 19:55)  HR: 86 (18-20 @ 12:55) (70 - 86)  BP: 108/36 (-18-20 @ 12:55) (96/31 - 123/34)  BP(mean): --  RR: 16 (-20 @ 12:55) (16 - 18)  SpO2: 97% (18-20 @ 12:55) (93% - 98%)    PHYSICAL EXAM:    Constitutional: Appears Well    Neurological: A& O x 3    Skin: Warm    Respiratory and Thorax: normal effort; Breath sounds: normal; No rales/wheezing/rhonchi  	  Cardiovascular: S1, S2, regular, NMBR	    Gastrointestinal: BS + x 4Q, nontender	    Genitourinary:  Bladder nondistended, nontender    Musculoskeletal:   General Right:   no muscle/joint tenderness,   normal tone, no joint swelling,   ROM: limited	    General Left:   no muscle/joint tenderness,   normal tone, no joint swelling,   ROM: full    Hip:  Right: Dressing CDI    Lower extrems:   Right: no calf tenderness              negative giorgio's sign               + pedal pulses    Left:   no calf tenderness              negative giorgio's sign               + pedal pulses                        7.1    5.26  )-----------( 120      ( 2020 10:15 )             20.5       06-18    133<L>  |  102  |  32<H>  ----------------------------<  227<H>  3.8   |  23  |  0.96    Ca    7.2<L>      2020 10:15    TPro  x   /  Alb  2.2<L>  /  TBili  x   /  DBili  x   /  AST  x   /  ALT  x   /  AlkPhos  x   -16                              10.2   6.52  )-----------( 140      ( 2020 06:41 )             30.4       06-17    138  |  107  |  34<H>  ----------------------------<  194<H>  4.2   |  23  |  0.96    Ca    7.5<L>      2020 06:41    TPro  x   /  Alb  2.2<L>  /  TBili  x   /  DBili  x   /  AST  x   /  ALT  x   /  AlkPhos  x   06-16      PT/INR - ( 2020 11:53 )   PT: 13.0 sec;   INR: 1.17 ratio         PTT - ( 2020 11:53 )  PTT:32.4 sec				    MEDICATIONS  (STANDING):  acetaminophen   Tablet .. 975 milliGRAM(s) Oral every 8 hours  atorvastatin 80 milliGRAM(s) Oral at bedtime  calcium carbonate 1250 mG  + Vitamin D (OsCal 500 + D) 1 Tablet(s) Oral daily  cholecalciferol 1000 Unit(s) Oral daily  enoxaparin Injectable 40 milliGRAM(s) SubCutaneous every 24 hours  famotidine    Tablet 20 milliGRAM(s) Oral every 12 hours  fenofibrate Tablet 145 milliGRAM(s) Oral daily  lactated ringers. 1000 milliLiter(s) IV Continuous <Continuous>  levothyroxine 50 MICROGram(s) Oral daily  losartan 50 milliGRAM(s) Oral daily  polyethylene glycol 3350 17 Gram(s) Oral daily      DVT Prophylaxis:  LMWH                   ( x )  Heparin SQ           (  )  Coumadin             (  )  Xarelto                  (  )  Eliquis                   (  )  Venodynes           (x  )  Ambulation          ( x )  UFH                       (  )  Contraindicated  (  )  EC Aspirin             (  )

## 2020-06-18 NOTE — PROGRESS NOTE ADULT - SUBJECTIVE AND OBJECTIVE BOX
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 She woke up feeling fine, hadn't eaten breakfast yet, when she made a phone call to get her car inspected. While on the telephone, she thinks she either lost her balance or may have felt dizzy and she fell backwards injuring her right hip. No LOC. She was subsequently unable to get up and brought to the ER. In ED found to have right subtrochanteric hip fracture. She currently feels ok. No pain at rest. No sob/chest pain. no recent f/c/r. Was eating and drinking as usual.   Was supposed to have cataract surgery next month, so chemo schedule was being adjusted.   She exercises regularly (walks a mile daily). No difficulty climbing a flight of stairs prior to fall.   No h/o heart disease/stroke. no prior reactions to anesthesia.    ROS: all 10 systems reviewed and is as above otherwise negative.     PMH: as above  PSH: left breast mastectomy  F/H; brother-colon ca, father-spinal ca, other-breast and bone ca  Social: No tobacco/etoh (16 Jun 2020 13:36)    6/17/20- feels dizzy and lightheaded today  6/18/20 still feels weak and fatigued. Up with PT but not able to do much. NO CP/SOB    Review of system- All 10 systems reviewed and is as per HPI otherwise negative.     Vital Signs Last 24 Hrs  T(C): 37.2 (18 Jun 2020 10:03), Max: 37.8 (17 Jun 2020 19:55)  T(F): 98.9 (18 Jun 2020 10:03), Max: 100.1 (17 Jun 2020 19:55)  HR: 82 (18 Jun 2020 10:29) (70 - 85)  BP: 123/34 (18 Jun 2020 10:29) (96/31 - 123/34)  BP(mean): --  RR: 18 (18 Jun 2020 10:29) (16 - 18)  SpO2: 98% (18 Jun 2020 10:29) (93% - 98%)    LABS:                        7.1    5.26  )-----------( 120      ( 18 Jun 2020 10:15 )             20.5     18 Jun 2020 10:15    133    |  102    |  32     ----------------------------<  227    3.8     |  23     |  0.96     Ca    7.2        18 Jun 2020 10:15    TPro  x      /  Alb  2.2    /  TBili  x      /  DBili  x      /  AST  x      /  ALT  x      /  AlkPhos  x      16 Jun 2020 21:09    LIVER FUNCTIONS - ( 16 Jun 2020 21:09 )  Alb: 2.2 g/dL / Pro: x     / ALK PHOS: x     / ALT: x     / AST: x     / GGT: x           PT/INR - ( 16 Jun 2020 11:53 )   PT: 13.0 sec;   INR: 1.17 ratio       PTT - ( 16 Jun 2020 11:53 )  PTT:32.4 sec    CARDIAC MARKERS ( 16 Jun 2020 11:53 )  <0.015 ng/mL / x     / 136 U/L / x     / x        RADIOLOGY & ADDITIONAL TESTS:      PHYSICAL EXAM:  GENERAL: NAD, well-groomed, well-developed  HEAD:  Atraumatic, Normocephalic  EYES: EOMI, PERRLA, conjunctiva and sclera clear  HEENT: Moist mucous membranes  NECK: Supple, No JVD  NERVOUS SYSTEM:  Alert & Oriented X3, Motor Strength 5/5 B/L upper and lower extremities; DTRs 2+ intact and symmetric  CHEST/LUNG: Clear to auscultation bilaterally; No rales, rhonchi, wheezing, or rubs  HEART: Regular rate and rhythm; No murmurs, rubs, or gallops  ABDOMEN: Soft, Nontender, Nondistended; Bowel sounds present  GENITOURINARY- Voiding, no palpable bladder  EXTREMITIES:  2+ Peripheral Pulses, No clubbing, cyanosis, or edema  MUSCULOSKELTAL- RT hip dressing dry  SKIN-no rash, no lesion  CNS- alert, oriented X3, non focal     Daily Height in cm: 152.4 (16 Jun 2020 15:35)      MEDICATIONS  (STANDING):  acetaminophen   Tablet .. 975 milliGRAM(s) Oral every 8 hours  atorvastatin 80 milliGRAM(s) Oral at bedtime  calcium carbonate 1250 mG  + Vitamin D (OsCal 500 + D) 1 Tablet(s) Oral daily  enoxaparin Injectable 40 milliGRAM(s) SubCutaneous every 24 hours  famotidine    Tablet 20 milliGRAM(s) Oral every 12 hours  fenofibrate Tablet 145 milliGRAM(s) Oral daily  lactated ringers. 1000 milliLiter(s) (75 mL/Hr) IV Continuous <Continuous>  levothyroxine 50 MICROGram(s) Oral daily  losartan 50 milliGRAM(s) Oral daily  polyethylene glycol 3350 17 Gram(s) Oral daily    MEDICATIONS  (PRN):  melatonin 3 milliGRAM(s) Oral at bedtime PRN Insomnia  morphine  - Injectable 2 milliGRAM(s) SubCutaneous every 4 hours PRN Severe Pain (7 - 10)  ondansetron Injectable 4 milliGRAM(s) IV Push every 6 hours PRN Nausea and/or Vomiting  oxyCODONE    IR 5 milliGRAM(s) Oral every 4 hours PRN Moderate Pain (4 - 6)    Assessment/Plan  #S/p mechanical Fall with Right hip fracture s/p IMN  #anemia acute blood loss from fracture/postop  Ortho eval appreciated  PT as tolerated  Pain meds prn  Transfuse PRBC today, repeat HH to decide upon further transfusion  Monitor HH  AC by Lovenox x4 weeks  Bowel regimen  Incentive spirometry    #H/o High grade MDS with high risk of progression to AML:  Onc eval DR Kirk appreciated  Patient is OK for discharge to [facility] and can postpone chemo until released from [facility]    3. HTN:  -continue arb    4. HLD:  -statin/fenofibrate    5. Depression:  -sertraline.     6. Hypothyroid:  -synthroid    7. DVT px- lovenox x4 weeks    #Dispo- continue PT, likely [facility] tomorrow if HH stable. D/w pt, DR Kirk and ortho team

## 2020-06-18 NOTE — PROGRESS NOTE ADULT - ASSESSMENT
A/P: 84yFemale s/p R femur IMN POD2    - Pain control  - PT/OT  - TTWB RLE  - DVT prophylaxis to start today per AC team  - Encourage incentive spirometry, deep breathing exercises  - Will discuss with attending, Dr. Salinas and advise if plan changes

## 2020-06-19 LAB
ANION GAP SERPL CALC-SCNC: 7 MMOL/L — SIGNIFICANT CHANGE UP (ref 5–17)
BUN SERPL-MCNC: 22 MG/DL — SIGNIFICANT CHANGE UP (ref 7–23)
CALCIUM SERPL-MCNC: 7.7 MG/DL — LOW (ref 8.5–10.1)
CHLORIDE SERPL-SCNC: 108 MMOL/L — SIGNIFICANT CHANGE UP (ref 96–108)
CO2 SERPL-SCNC: 24 MMOL/L — SIGNIFICANT CHANGE UP (ref 22–31)
CREAT SERPL-MCNC: 0.65 MG/DL — SIGNIFICANT CHANGE UP (ref 0.5–1.3)
GLUCOSE SERPL-MCNC: 238 MG/DL — HIGH (ref 70–99)
HCT VFR BLD CALC: 25.8 % — LOW (ref 34.5–45)
HGB BLD-MCNC: 8.9 G/DL — LOW (ref 11.5–15.5)
MCHC RBC-ENTMCNC: 31.2 PG — SIGNIFICANT CHANGE UP (ref 27–34)
MCHC RBC-ENTMCNC: 34.5 GM/DL — SIGNIFICANT CHANGE UP (ref 32–36)
MCV RBC AUTO: 90.5 FL — SIGNIFICANT CHANGE UP (ref 80–100)
PLATELET # BLD AUTO: 96 K/UL — LOW (ref 150–400)
POTASSIUM SERPL-MCNC: 3.9 MMOL/L — SIGNIFICANT CHANGE UP (ref 3.5–5.3)
POTASSIUM SERPL-SCNC: 3.9 MMOL/L — SIGNIFICANT CHANGE UP (ref 3.5–5.3)
RBC # BLD: 2.85 M/UL — LOW (ref 3.8–5.2)
RBC # FLD: 14.6 % — HIGH (ref 10.3–14.5)
SODIUM SERPL-SCNC: 139 MMOL/L — SIGNIFICANT CHANGE UP (ref 135–145)
WBC # BLD: 4.21 K/UL — SIGNIFICANT CHANGE UP (ref 3.8–10.5)
WBC # FLD AUTO: 4.21 K/UL — SIGNIFICANT CHANGE UP (ref 3.8–10.5)

## 2020-06-19 PROCEDURE — 99231 SBSQ HOSP IP/OBS SF/LOW 25: CPT

## 2020-06-19 PROCEDURE — 99233 SBSQ HOSP IP/OBS HIGH 50: CPT

## 2020-06-19 RX ORDER — HALOPERIDOL DECANOATE 100 MG/ML
1 INJECTION INTRAMUSCULAR ONCE
Refills: 0 | Status: COMPLETED | OUTPATIENT
Start: 2020-06-19 | End: 2020-06-19

## 2020-06-19 RX ADMIN — Medication 0.5 MILLIGRAM(S): at 17:59

## 2020-06-19 RX ADMIN — MORPHINE SULFATE 2 MILLIGRAM(S): 50 CAPSULE, EXTENDED RELEASE ORAL at 19:02

## 2020-06-19 RX ADMIN — Medication 1000 UNIT(S): at 09:21

## 2020-06-19 RX ADMIN — POLYETHYLENE GLYCOL 3350 17 GRAM(S): 17 POWDER, FOR SOLUTION ORAL at 09:21

## 2020-06-19 RX ADMIN — Medication 50 MICROGRAM(S): at 05:07

## 2020-06-19 RX ADMIN — Medication 975 MILLIGRAM(S): at 05:07

## 2020-06-19 RX ADMIN — LOSARTAN POTASSIUM 50 MILLIGRAM(S): 100 TABLET, FILM COATED ORAL at 09:21

## 2020-06-19 RX ADMIN — FAMOTIDINE 20 MILLIGRAM(S): 10 INJECTION INTRAVENOUS at 09:24

## 2020-06-19 RX ADMIN — ENOXAPARIN SODIUM 40 MILLIGRAM(S): 100 INJECTION SUBCUTANEOUS at 09:21

## 2020-06-19 RX ADMIN — HALOPERIDOL DECANOATE 1 MILLIGRAM(S): 100 INJECTION INTRAMUSCULAR at 19:12

## 2020-06-19 RX ADMIN — Medication 145 MILLIGRAM(S): at 09:22

## 2020-06-19 RX ADMIN — Medication 1 TABLET(S): at 09:21

## 2020-06-19 RX ADMIN — OXYCODONE HYDROCHLORIDE 5 MILLIGRAM(S): 5 TABLET ORAL at 09:22

## 2020-06-19 RX ADMIN — Medication 975 MILLIGRAM(S): at 13:07

## 2020-06-19 NOTE — PROGRESS NOTE ADULT - ASSESSMENT
A/P: 84yFemale s/p R femur IMN POD3    - Pain control  - PT/OT  - TTWB RLE  - DVT prophylaxis to start today per AC team  - Encourage incentive spirometry, deep breathing exercises  - Will discuss with attending, Dr. Salinas and advise if plan changes A/P: 84yFemale s/p R femur IMN POD3    - Pain control  - PT/OT  - TTWB RLE  - DVT prophylaxis per AC team  - Encourage incentive spirometry, deep breathing exercises  - Will discuss with attending, Dr. Salinas and advise if plan changes

## 2020-06-19 NOTE — PROGRESS NOTE ADULT - SUBJECTIVE AND OBJECTIVE BOX
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 She woke up feeling fine, hadn't eaten breakfast yet, when she made a phone call to get her car inspected. While on the telephone, she thinks she either lost her balance or may have felt dizzy and she fell backwards injuring her right hip. No LOC. She was subsequently unable to get up and brought to the ER. In ED found to have right subtrochanteric hip fracture.  She underwent IM nail 6/16.   She required prbcs post op for anemia related to surgery.     6/19: pt seen and examined this am. Felt ok. c/o pain/swelling RIght hip/thigh. no sob/chest pain.     ROS: all 10 systems reviewed and is as above otherwise negative.     Vital Signs Last 24 Hrs  T(C): 37.4 (19 Jun 2020 09:24), Max: 37.8 (18 Jun 2020 16:59)  T(F): 99.4 (19 Jun 2020 09:24), Max: 100 (18 Jun 2020 16:59)  HR: 81 (19 Jun 2020 09:24) (75 - 87)  BP: 115/54 (19 Jun 2020 09:24) (106/47 - 123/45)  RR: 16 (19 Jun 2020 09:24) (16 - 18)  SpO2: 97% (19 Jun 2020 09:24) (95% - 98%)    PHYSICAL EXAM:    GENERAL: Comfortable, no acute distress   HEAD:  Normocephalic, atraumatic  EYES: EOMI, PERRLA  HEENT: Moist mucous membranes  NECK: Supple, No JVD  NERVOUS SYSTEM:  Alert & Oriented X3, grossly non focal.   CHEST/LUNG: Clear to auscultation bilaterally  HEART: Regular rate and rhythm  ABDOMEN: Soft, Nontender, Nondistended, Bowel sounds present  GENITOURINARY: Voiding, no palpable bladder  EXTREMITIES:   No clubbing, cyanosis, or edema  MUSCULOSKELETAL- Right thigh hematoma+. Hip dressing c/d/i  SKIN-no rash        LABS:                        8.9    4.21  )-----------( 96       ( 19 Jun 2020 06:28 )             25.8     06-19    139  |  108  |  22  ----------------------------<  238<H>  3.9   |  24  |  0.65    Ca    7.7<L>      19 Jun 2020 06:28          MEDICATIONS  (STANDING):  acetaminophen   Tablet .. 975 milliGRAM(s) Oral every 8 hours  atorvastatin 80 milliGRAM(s) Oral at bedtime  calcium carbonate 1250 mG  + Vitamin D (OsCal 500 + D) 1 Tablet(s) Oral daily  cholecalciferol 1000 Unit(s) Oral daily  enoxaparin Injectable 40 milliGRAM(s) SubCutaneous every 24 hours  famotidine    Tablet 20 milliGRAM(s) Oral every 12 hours  fenofibrate Tablet 145 milliGRAM(s) Oral daily  lactated ringers. 1000 milliLiter(s) (75 mL/Hr) IV Continuous <Continuous>  levothyroxine 50 MICROGram(s) Oral daily  losartan 50 milliGRAM(s) Oral daily  polyethylene glycol 3350 17 Gram(s) Oral daily    MEDICATIONS  (PRN):  melatonin 3 milliGRAM(s) Oral at bedtime PRN Insomnia  morphine  - Injectable 2 milliGRAM(s) SubCutaneous every 4 hours PRN Severe Pain (7 - 10)  ondansetron Injectable 4 milliGRAM(s) IV Push every 6 hours PRN Nausea and/or Vomiting  oxyCODONE    IR 5 milliGRAM(s) Oral every 4 hours PRN Moderate Pain (4 - 6)        Assessment/Plan  #S/p mechanical Fall with Right hip fracture s/p IMN POD#3.  #anemia acute blood loss from fracture/postop  -transfused yesterday with good response.  -physical therapy  -incentive spirometry  -bowel regimen.     #Thrombocytopenia:  -likely related to consumption, but d/w a/c services, will monitor for one more day.     #H/o High grade MDS with high risk of progression to AML:  Onc kalpesh Kirk appreciated  Patient is OK for discharge to [facility] and can postpone chemo until released from [facility]    # HTN:  -continue arb    # HLD:  -statin/fenofibrate    # Depression:  -sertraline.     # Hypothyroid:  -synthroid    # DVT px-   lovenox x4 weeks    #DIspo:  if platelets stable, dc planning to fei tomorrow.

## 2020-06-19 NOTE — PROGRESS NOTE ADULT - ASSESSMENT
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 Pt now s/p right femur IMN  on 6-.  Pt has high thrombosis risk and requires anticoagulation prophylaxis.     6- Discussed with pt the use of lovenox as her anticoagulation medication and she is welling to us it.   6- Spoke with Dr Zuniga concerning decreased plts will monitor one more day.     Plan:  :Lovenox 40mg sq daily for four weeks post procedure  :daily cbc/bmp noted low h/h 7.1/ 20.5   received 1units of PRBC yesterday. today 8.9 /25.8 plts noted 96,00 from 102,000 yesterday will monitor   :LE Venodynes  : increase mobility as tolerated   will f/u

## 2020-06-19 NOTE — PROGRESS NOTE ADULT - SUBJECTIVE AND OBJECTIVE BOX
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 She woke up feeling fine, hadn't eaten breakfast yet, when she made a phone call to get her car inspected. While on the telephone, she thinks she either lost her balance or may have felt dizzy and she fell backwards injuring her right hip. No LOC. She was subsequently unable to get up and brought to the ER. In ED found to have right subtrochanteric hip fracture. She currently feels ok. No pain at rest. No sob/chest pain. no recent f/c/r. Was eating and drinking as usual.   Was supposed to have cataract surgery next month, so chemo schedule was being adjusted.   She exercises regularly (walks a mile daily). No difficulty climbing a flight of stairs prior to fall.   No h/o heart disease/stroke. no prior reactions to anesthesia.      Patient is a 84y old  Female who presents with a chief complaint of right hip fracture (2020 13:36)      Consulted by Dr. Pancho Salinas  for VTE prophylaxis, risk stratification, and anticoagulation management.    PAST MEDICAL & SURGICAL HISTORY:  Cataract: left eye  Macular degeneration: right  Hypothyroidism  HLD (hyperlipidemia)  HTN (hypertension)  Acute promyelocytic leukemia  Anemia  H/O lumpectomy: Left side in , performed by Dr. Samuels at   Interval Note:  [facility]  Discussed her anticoagulation with Lovenox for a total of four weeks post her procedure.  Pt states she knows how to give self inj if discharged home.  Questions answered risks and benefits discussed.  Pt wants to go home but she lives alone.    2020 Pt seen on [facility] oob in chair.  Discussed her anticoagulation with Lovenox  No concerns Pt states she is now going to rehab not sure which one. States her son will take care of it.  2020 Pt seen at bedside on orth oob in chair.  Pt for rehab on discharge.  Noted decrease plts     FAMILY HISTORY:  Family history of cancer in brother: Colon cancer  Patient's mother is : Hx of breast and bone cancer  Patient's father is : Hx of spine cancer    CAPRINI SCORE  AGE RELATED RISK FACTORS                                                       MOBILITY RELATED FACTORS  [ ] Age 41-60 years                                            (1 Point)                  [ ] Bed rest                                                        (1 Point)  [ ] Age: 61-74 years                                           (2 Points)                [ ] Plaster cast                                                   (2 Points)  [x ] Age= 75 years                                              (3 Points)                 [ ] Bed bound for more than 72 hours                   (2 Points)    DISEASE RELATED RISK FACTORS                                               GENDER SPECIFIC FACTORS  [ ] Edema in the lower extremities                       (1 Point)           [ ] Pregnancy                                                            (1 Point)  [ ] Varicose veins                                               (1 Point)                  [ ] Post-partum < 6 weeks                                      (1 Point)             [ ] BMI > 25 Kg/m2                                            (1 Point)                  [ ] Hormonal therapy or oral contraception       (1 Point)                 [ ] Sepsis (in the previous month)                        (1 Point)             [ ] History of pregnancy complications                (1Point)  [ ] Pneumonia or serious lung disease                                             [ ] Unexplained or recurrent  (=/>3), premature                                 (In the previous month)                               (1 Point)                birth with toxemia or growth-restricted infant (1 Point)  [ ] Abnormal pulmonary function test            (1 Point)                                   SURGERY RELATED RISK FACTORS  [ ] Acute myocardial infarction                       (1 Point)                  [ ]  Section                                         (1 Point)  [ ] Congestive heart failure (in the previous month) (1 Point)   [ ] Minor surgery   lasting <45 minutes       (1 Point)   [ ] Inflammatory bowel disease                             (1 Point)          [ ] Arthroscopic surgery                                  (2 Points)  [ ] Central venous access                                    (2 Points)            [ x] General surgery lasting >45 minutes      (2 Points)       [ ] Stroke (in the previous month)                  (5 Points)            [ ] Elective major lower extremity arthroplasty (5 Points)                                   [ x ] Malignancy (present or past include skin melanoma                                          but exclude  basal skin cell)    (2 points)                                      TRAUMA RELATED RISK FACTORS                HEMATOLOGY RELATED FACTORS                                  [ x] Fracture of the hip, pelvis, or leg                       (5 Points)  [ ] Prior episodes of VTE                                     (3 Points)          [ ] Acute spinal cord injury (in the previous month)  (5 Points)  [ ] Positive family history for VTE                         (3 Points)       [ ] Paralysis (less than 1 month)                          (5 Points)  [ ] Prothrombin 66474 A                                      (3 Points)         [ ] Multiple Trauma (within 1month)                 (5Points)                                                                                                                                                                [ ] Factor V Leiden                                          (3 Points)                                OTHER RISK FACTORS                          [ ] Lupus anticoagulants                                     (3 Points)                       [ ] BMI > 40                          (1 Point)                                                         [ ] Anticardiolipin antibodies                                (3 Points)                   [ ] Smoking                              (1Point)                                                [ ] High homocysteine in the blood                      (3 Points)                [  ] Diabetes requiring insulin (1point)                         [ ] Other congenital or acquired thrombophilia       (3 Points)          [  ] Chemotherapy                   (1 Point)  [ ] Heparin induced thrombocytopenia                  (3 Points)             [  ] Blood Transfusion                (1 point)                                                                                                             Total Score [ 10 ]                                                                                                                                                                                                                                                                                                                                                                                                                                         IMPROVE Bleeding Risk Score    Falls Risk:   High ( x )  Mod (  )  Low (  )    Interval Note    EBL:  400ml  crcl 38.2 cr .96 bmi 23.3        Denies any personal or familial history of clotting or bleeding disorders.    Allergies    No Known Allergies    Intolerances        REVIEW OF SYSTEMS    (  )Fever	     (  )Constipation	(  )SOB				(  )Headache	(  )Dysuria  (  )Chills	     (  )Melena	(  )Dyspnea present on exertion	                    (  )Dizziness                    (  )Polyuria  (  )Nausea	     (  )Hematochezia	(  )Cough			                    (  )Syncope   	(  )Hematuria  (  )Vomiting    (  )Chest Pain	(  )Wheezing			(x  )Weakness  (x) pain  (  )Diarrhea     (  )Palpitations	(  )Anorexia			(  )Myalgia    Pertinent positives in HPI and daily subjective.  All other ROS negative.    Vital Signs Last 24 Hrs  T(C): 37.4 (20 @ 09:24), Max: 37.8 (20 @ 16:59)  T(F): 99.4 (20 @ 09:24), Max: 100 (1820 @ 16:59)  HR: 81 (20 @ 09:24) (75 - 87)  BP: 115/54 (20 @ 09:24) (106/47 - 123/45)  BP(mean): --  RR: 16 (20 @ 09:24) (16 - 18)  SpO2: 97% (20 @ 09:24) (95% - 98%)    PHYSICAL EXAM:    Constitutional: Appears Well    Neurological: A& O x 3    Skin: Warm    Respiratory and Thorax: normal effort; Breath sounds: normal; No rales/wheezing/rhonchi  	  Cardiovascular: S1, S2, regular, NMBR	    Gastrointestinal: BS + x 4Q, nontender	    Genitourinary:  Bladder nondistended, nontender    Musculoskeletal:   General Right:   no muscle/joint tenderness,   normal tone, no joint swelling,   ROM: limited	    General Left:   no muscle/joint tenderness,   normal tone, no joint swelling,   ROM: full    Hip:  Right: Dressing CDI    Lower extrems:   Right: no calf tenderness              negative giorgio's sign               + pedal pulses    Left:   no calf tenderness              negative giorgio's sign               + pedal pulses                         8.9    4.21  )-----------( 96       ( 2020 06:28 )             25.8       06-19    139  |  108  |  22  ----------------------------<  238<H>  3.9   |  24  |  0.65    Ca    7.7<L>      2020 06:28                             7.1    5.26  )-----------( 120      ( 2020 10:15 )             20.5       06-18    133<L>  |  102  |  32<H>  ----------------------------<  227<H>  3.8   |  23  |  0.96    Ca    7.2<L>      2020 10:15    TPro  x   /  Alb  2.2<L>  /  TBili  x   /  DBili  x   /  AST  x   /  ALT  x   /  AlkPhos  x                                 10.2   6.52  )-----------( 140      ( 2020 06:41 )             30.4       06-17    138  |  107  |  34<H>  ----------------------------<  194<H>  4.2   |  23  |  0.96    Ca    7.5<L>      2020 06:41    TPro  x   /  Alb  2.2<L>  /  TBili  x   /  DBili  x   /  AST  x   /  ALT  x   /  AlkPhos  x   06-16      PT/INR - ( 2020 11:53 )   PT: 13.0 sec;   INR: 1.17 ratio         PTT - ( 2020 11:53 )  PTT:32.4 sec				    MEDICATIONS  (STANDING):  acetaminophen   Tablet .. 975 milliGRAM(s) Oral every 8 hours  atorvastatin 80 milliGRAM(s) Oral at bedtime  calcium carbonate 1250 mG  + Vitamin D (OsCal 500 + D) 1 Tablet(s) Oral daily  cholecalciferol 1000 Unit(s) Oral daily  enoxaparin Injectable 40 milliGRAM(s) SubCutaneous every 24 hours  famotidine    Tablet 20 milliGRAM(s) Oral every 12 hours  fenofibrate Tablet 145 milliGRAM(s) Oral daily  lactated ringers. 1000 milliLiter(s) IV Continuous <Continuous>  levothyroxine 50 MICROGram(s) Oral daily  losartan 50 milliGRAM(s) Oral daily  polyethylene glycol 3350 17 Gram(s) Oral daily        DVT Prophylaxis:  LMWH                   ( x )  Heparin SQ           (  )  Coumadin             (  )  Xarelto                  (  )  Eliquis                   (  )  Venodynes           (x  )  Ambulation          ( x )  UFH                       (  )  Contraindicated  (  )  EC Aspirin             (  )

## 2020-06-19 NOTE — PROGRESS NOTE ADULT - SUBJECTIVE AND OBJECTIVE BOX
Pt S/E at bedside, no acute events overnight, pain controlled, denies SOB, CP, N, V, numbness, tingling, fevers, chills. Pt received 2 units of blood yesterday for low H/H.    Vital Signs Last 24 Hrs  T(C): 37.4 (19 Jun 2020 04:20), Max: 37.8 (18 Jun 2020 16:59)  T(F): 99.4 (19 Jun 2020 04:20), Max: 100 (18 Jun 2020 16:59)  HR: 75 (19 Jun 2020 04:20) (75 - 87)  BP: 113/47 (19 Jun 2020 04:20) (96/31 - 123/45)  BP(mean): --  RR: 16 (19 Jun 2020 04:20) (16 - 18)  SpO2: 96% (19 Jun 2020 04:20) (95% - 98%)    Gen: NAD,    Right Lower Extremity:  Dressing clean dry intact  +EHL/FHL/TA/GS  SILT L3-S1  +DP/PT Pulses  Compartments soft  No calf TTP B/L

## 2020-06-20 VITALS
DIASTOLIC BLOOD PRESSURE: 46 MMHG | RESPIRATION RATE: 16 BRPM | SYSTOLIC BLOOD PRESSURE: 110 MMHG | OXYGEN SATURATION: 97 % | TEMPERATURE: 100 F | HEART RATE: 73 BPM

## 2020-06-20 LAB
HCT VFR BLD CALC: 26.1 % — LOW (ref 34.5–45)
HGB BLD-MCNC: 8.8 G/DL — LOW (ref 11.5–15.5)
MCHC RBC-ENTMCNC: 31.7 PG — SIGNIFICANT CHANGE UP (ref 27–34)
MCHC RBC-ENTMCNC: 33.7 GM/DL — SIGNIFICANT CHANGE UP (ref 32–36)
MCV RBC AUTO: 93.9 FL — SIGNIFICANT CHANGE UP (ref 80–100)
PLATELET # BLD AUTO: 138 K/UL — LOW (ref 150–400)
RBC # BLD: 2.78 M/UL — LOW (ref 3.8–5.2)
RBC # FLD: 15 % — HIGH (ref 10.3–14.5)
WBC # BLD: 4.66 K/UL — SIGNIFICANT CHANGE UP (ref 3.8–10.5)
WBC # FLD AUTO: 4.66 K/UL — SIGNIFICANT CHANGE UP (ref 3.8–10.5)

## 2020-06-20 PROCEDURE — 99239 HOSP IP/OBS DSCHRG MGMT >30: CPT

## 2020-06-20 PROCEDURE — 99231 SBSQ HOSP IP/OBS SF/LOW 25: CPT

## 2020-06-20 RX ORDER — ENOXAPARIN SODIUM 100 MG/ML
40 INJECTION SUBCUTANEOUS
Qty: 0 | Refills: 0 | DISCHARGE
Start: 2020-06-20

## 2020-06-20 RX ORDER — CHOLECALCIFEROL (VITAMIN D3) 125 MCG
1000 CAPSULE ORAL
Qty: 0 | Refills: 0 | DISCHARGE
Start: 2020-06-20

## 2020-06-20 RX ORDER — POLYETHYLENE GLYCOL 3350 17 G/17G
17 POWDER, FOR SOLUTION ORAL
Qty: 0 | Refills: 0 | DISCHARGE
Start: 2020-06-20

## 2020-06-20 RX ADMIN — Medication 145 MILLIGRAM(S): at 10:32

## 2020-06-20 RX ADMIN — Medication 1 TABLET(S): at 10:32

## 2020-06-20 RX ADMIN — LOSARTAN POTASSIUM 50 MILLIGRAM(S): 100 TABLET, FILM COATED ORAL at 10:32

## 2020-06-20 RX ADMIN — POLYETHYLENE GLYCOL 3350 17 GRAM(S): 17 POWDER, FOR SOLUTION ORAL at 10:32

## 2020-06-20 RX ADMIN — Medication 1000 UNIT(S): at 10:32

## 2020-06-20 RX ADMIN — ENOXAPARIN SODIUM 40 MILLIGRAM(S): 100 INJECTION SUBCUTANEOUS at 10:32

## 2020-06-20 RX ADMIN — FAMOTIDINE 20 MILLIGRAM(S): 10 INJECTION INTRAVENOUS at 10:32

## 2020-06-20 NOTE — PROGRESS NOTE ADULT - SUBJECTIVE AND OBJECTIVE BOX
Pt S/E at bedside, no acute events overnight, pain controlled    Vital Signs Last 24 Hrs  T(C): 36.9 (20 Jun 2020 05:38), Max: 37.4 (19 Jun 2020 09:24)  T(F): 98.4 (20 Jun 2020 05:38), Max: 99.4 (19 Jun 2020 09:24)  HR: 81 (20 Jun 2020 05:38) (81 - 88)  BP: 143/52 (20 Jun 2020 05:38) (115/54 - 143/52)  BP(mean): --  RR: 16 (20 Jun 2020 05:38) (16 - 16)  SpO2: 93% (20 Jun 2020 05:38) (93% - 97%)    Gen: NAD,    Right Lower Extremity:  Small amount of saturation on proximal dressing, new dressing applied. Incisions well appearing, no erythema, staples in place.   +EHL/FHL/TA/GS  SILT L3-S1  +DP/PT Pulses  Compartments soft  No calf TTP B/L

## 2020-06-20 NOTE — PROGRESS NOTE ADULT - ASSESSMENT
A/P: 84yFemale s/p R femur IMN POD 4    - Pain control  - PT/OT  - TTWB RLE  - DVT prophylaxis per [facility] team  - Encourage incentive spirometry, deep breathing exercises  - No further orthopedic surgical intervention planned  - Ortho stable for DC  - Will discuss with attending, Dr. Salinas and advise if plan changes

## 2020-06-20 NOTE — PROGRESS NOTE ADULT - SUBJECTIVE AND OBJECTIVE BOX
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 She woke up feeling fine, hadn't eaten breakfast yet, when she made a phone call to get her car inspected. While on the telephone, she thinks she either lost her balance or may have felt dizzy and she fell backwards injuring her right hip. No LOC. She was subsequently unable to get up and brought to the ER. In ED found to have right subtrochanteric hip fracture. She currently feels ok. No pain at rest. No sob/chest pain. no recent f/c/r. Was eating and drinking as usual.   Was supposed to have cataract surgery next month, so chemo schedule was being adjusted.   She exercises regularly (walks a mile daily). No difficulty climbing a flight of stairs prior to fall.   No h/o heart disease/stroke. no prior reactions to anesthesia.      Patient is a 84y old  Female who presents with a chief complaint of right hip fracture (2020 13:36)      Consulted by Dr. Pancho Salinas  for VTE prophylaxis, risk stratification, and anticoagulation management.    PAST MEDICAL & SURGICAL HISTORY:  Cataract: left eye  Macular degeneration: right  Hypothyroidism  HLD (hyperlipidemia)  HTN (hypertension)  Acute promyelocytic leukemia  Anemia  H/O lumpectomy: Left side in , performed by Dr. Samuels at   Interval Note:  2020 Pt seen at bedside on [facility].  Discussed her anticoagulation with Lovenox for a total of four weeks post her procedure.  Pt states she knows how to give self inj if discharged home.  Questions answered risks and benefits discussed.  Pt wants to go home but she lives alone.    2020 Pt seen on [facility] oob in chair.  Discussed her anticoagulation with Lovenox  No concerns Pt states she is now going to rehab not sure which one. States her son will take care of it.  2020 Pt seen at bedside on orth oob in chair.  Pt for rehab on discharge.  Noted decrease plts   20: Pt seen at bedside, awake sleepy, CBC pending, no s/s of bleeding, Dispo:rehab    FAMILY HISTORY:  Family history of cancer in brother: Colon cancer  Patient's mother is : Hx of breast and bone cancer  Patient's father is : Hx of spine cancer    CAPRINI SCORE  AGE RELATED RISK FACTORS                                                       MOBILITY RELATED FACTORS  [ ] Age 41-60 years                                            (1 Point)                  [ ] Bed rest                                                        (1 Point)  [ ] Age: 61-74 years                                           (2 Points)                [ ] Plaster cast                                                   (2 Points)  [x ] Age= 75 years                                              (3 Points)                 [ ] Bed bound for more than 72 hours                   (2 Points)    DISEASE RELATED RISK FACTORS                                               GENDER SPECIFIC FACTORS  [ ] Edema in the lower extremities                       (1 Point)           [ ] Pregnancy                                                            (1 Point)  [ ] Varicose veins                                               (1 Point)                  [ ] Post-partum < 6 weeks                                      (1 Point)             [ ] BMI > 25 Kg/m2                                            (1 Point)                  [ ] Hormonal therapy or oral contraception       (1 Point)                 [ ] Sepsis (in the previous month)                        (1 Point)             [ ] History of pregnancy complications                (1Point)  [ ] Pneumonia or serious lung disease                                             [ ] Unexplained or recurrent  (=/>3), premature                                 (In the previous month)                               (1 Point)                birth with toxemia or growth-restricted infant (1 Point)  [ ] Abnormal pulmonary function test            (1 Point)                                   SURGERY RELATED RISK FACTORS  [ ] Acute myocardial infarction                       (1 Point)                  [ ]  Section                                         (1 Point)  [ ] Congestive heart failure (in the previous month) (1 Point)   [ ] Minor surgery   lasting <45 minutes       (1 Point)   [ ] Inflammatory bowel disease                             (1 Point)          [ ] Arthroscopic surgery                                  (2 Points)  [ ] Central venous access                                    (2 Points)            [ x] General surgery lasting >45 minutes      (2 Points)       [ ] Stroke (in the previous month)                  (5 Points)            [ ] Elective major lower extremity arthroplasty (5 Points)                                   [ x ] Malignancy (present or past include skin melanoma                                          but exclude  basal skin cell)    (2 points)                                      TRAUMA RELATED RISK FACTORS                HEMATOLOGY RELATED FACTORS                                  [ x] Fracture of the hip, pelvis, or leg                       (5 Points)  [ ] Prior episodes of VTE                                     (3 Points)          [ ] Acute spinal cord injury (in the previous month)  (5 Points)  [ ] Positive family history for VTE                         (3 Points)       [ ] Paralysis (less than 1 month)                          (5 Points)  [ ] Prothrombin 95156 A                                      (3 Points)         [ ] Multiple Trauma (within 1month)                 (5Points)                                                                                                                                                                [ ] Factor V Leiden                                          (3 Points)                                OTHER RISK FACTORS                          [ ] Lupus anticoagulants                                     (3 Points)                       [ ] BMI > 40                          (1 Point)                                                         [ ] Anticardiolipin antibodies                                (3 Points)                   [ ] Smoking                              (1Point)                                                [ ] High homocysteine in the blood                      (3 Points)                [  ] Diabetes requiring insulin (1point)                         [ ] Other congenital or acquired thrombophilia       (3 Points)          [  ] Chemotherapy                   (1 Point)  [ ] Heparin induced thrombocytopenia                  (3 Points)             [  ] Blood Transfusion                (1 point)                                                                                                             Total Score [ 10 ]                                                                                                                                                                                                                                                                                                                                                                                                                                         IMPROVE Bleeding Risk Score    Falls Risk:   High ( x )  Mod (  )  Low (  )    Interval Note    EBL:  400ml  crcl 38.2 cr .96 bmi 23.3        Denies any personal or familial history of clotting or bleeding disorders.    Allergies    No Known Allergies    Intolerances        REVIEW OF SYSTEMS    (  )Fever	     (  )Constipation	(  )SOB				(  )Headache	(  )Dysuria  (  )Chills	     (  )Melena	(  )Dyspnea present on exertion	                    (  )Dizziness                    (  )Polyuria  (  )Nausea	     (  )Hematochezia	(  )Cough			                    (  )Syncope   	(  )Hematuria  (  )Vomiting    (  )Chest Pain	(  )Wheezing			(x  )Weakness  (x) pain  (  )Diarrhea     (  )Palpitations	(  )Anorexia			(  )Myalgia    Pertinent positives in HPI and daily subjective.  All other ROS negative.  Vital Signs Last 24 Hrs  T(C): 36.9 (2020 05:38), Max: 37.3 (2020 18:36)  T(F): 98.4 (2020 05:38), Max: 99.2 (2020 18:36)  HR: 81 (2020 05:38) (81 - 88)  BP: 143/52 (2020 05:38) (132/46 - 143/52)  BP(mean): --  RR: 16 (2020 05:38) (16 - 16)  SpO2: 93% (2020 05:38) (93% - 96%)  PHYSICAL EXAM:    Constitutional: Appears Well    Neurological: A& O x 3    Skin: Warm    Respiratory and Thorax: normal effort; Breath sounds: normal; No rales/wheezing/rhonchi  	  Cardiovascular: S1, S2, regular, NMBR	    Gastrointestinal: BS + x 4Q, nontender	    Genitourinary:  Bladder nondistended, nontender    Musculoskeletal:   General Right:   no muscle/joint tenderness,   normal tone, no joint swelling,   ROM: limited	    General Left:   no muscle/joint tenderness,   normal tone, no joint swelling,   ROM: full    Hip:  Right: Dressing CDI    Lower extrems:   Right: no calf tenderness              negative giorgio's sign               + pedal pulses    Left:   no calf tenderness              negative giorgio's sign               + pedal pulses                         8.9    4.21  )-----------( 96       ( 2020 06:28 )             25.8       06-19    139  |  108  |  22  ----------------------------<  238<H>  3.9   |  24  |  0.65    Ca    7.7<L>      2020 06:28                             7.1    5.26  )-----------( 120      ( 2020 10:15 )             20.5       06-18    133<L>  |  102  |  32<H>  ----------------------------<  227<H>  3.8   |  23  |  0.96    Ca    7.2<L>      2020 10:15    TPro  x   /  Alb  2.2<L>  /  TBili  x   /  DBili  x   /  AST  x   /  ALT  x   /  AlkPhos  x                                 10.2   6.52  )-----------( 140      ( 2020 06:41 )             30.4       06-17    138  |  107  |  34<H>  ----------------------------<  194<H>  4.2   |  23  |  0.96    Ca    7.5<L>      2020 06:41    TPro  x   /  Alb  2.2<L>  /  TBili  x   /  DBili  x   /  AST  x   /  ALT  x   /  AlkPhos  x   06-16      PT/INR - ( 2020 11:53 )   PT: 13.0 sec;   INR: 1.17 ratio         PTT - ( 2020 11:53 )  PTT:32.4 sec				    MEDICATIONS  (STANDING):  acetaminophen   Tablet .. 975 milliGRAM(s) Oral every 8 hours  atorvastatin 80 milliGRAM(s) Oral at bedtime  calcium carbonate 1250 mG  + Vitamin D (OsCal 500 + D) 1 Tablet(s) Oral daily  cholecalciferol 1000 Unit(s) Oral daily  enoxaparin Injectable 40 milliGRAM(s) SubCutaneous every 24 hours  famotidine    Tablet 20 milliGRAM(s) Oral every 12 hours  fenofibrate Tablet 145 milliGRAM(s) Oral daily  lactated ringers. 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 She woke up feeling fine, hadn't eaten breakfast yet, when she made a phone call to get her car inspected. While on the telephone, she thinks she either lost her balance or may have felt dizzy and she fell backwards injuring her right hip. No LOC. She was subsequently unable to get up and brought to the ER. In ED found to have right subtrochanteric hip fracture. She currently feels ok. No pain at rest. No sob/chest pain. no recent f/c/r. Was eating and drinking as usual.   Was supposed to have cataract surgery next month, so chemo schedule was being adjusted.   She exercises regularly (walks a mile daily). No difficulty climbing a flight of stairs prior to fall.   No h/o heart disease/stroke. no prior reactions to anesthesia.      Patient is a 84y old  Female who presents with a chief complaint of right hip fracture (2020 13:36)      Consulted by Dr. Pancho Salinas  for VTE prophylaxis, risk stratification, and anticoagulation management.    PAST MEDICAL & SURGICAL HISTORY:  Cataract: left eye  Macular degeneration: right  Hypothyroidism  HLD (hyperlipidemia)  HTN (hypertension)  Acute promyelocytic leukemia  Anemia  H/O lumpectomy: Left side in , performed by Dr. Samuels at   Interval Note:  2020 Pt seen at bedside on [facility].  Discussed her anticoagulation with Lovenox for a total of four weeks post her procedure.  Pt states she knows how to give self inj if discharged home.  Questions answered risks and benefits discussed.  Pt wants to go home but she lives alone.    2020 Pt seen on [facility] oob in chair.  Discussed her anticoagulation with Lovenox  No concerns Pt states she is now going to rehab not sure which one. States her son will take care of it.  2020 Pt seen at bedside on [facility] oob in chair.  Pt for rehab on discharge.  Noted decrease plts   20: Pt seen at bedside, awake sleepy, H&H stable, no s/s of bleeding, Dispo:rehab    FAMILY HISTORY:  Family history of cancer in brother: Colon cancer  Patient's mother is : Hx of breast and bone cancer  Patient's father is : Hx of spine cancer    CAPRINI SCORE  AGE RELATED RISK FACTORS                                                       MOBILITY RELATED FACTORS  [ ] Age 41-60 years                                            (1 Point)                  [ ] Bed rest                                                        (1 Point)  [ ] Age: 61-74 years                                           (2 Points)                [ ] Plaster cast                                                   (2 Points)  [x ] Age= 75 years                                              (3 Points)                 [ ] Bed bound for more than 72 hours                   (2 Points)    DISEASE RELATED RISK FACTORS                                               GENDER SPECIFIC FACTORS  [ ] Edema in the lower extremities                       (1 Point)           [ ] Pregnancy                                                            (1 Point)  [ ] Varicose veins                                               (1 Point)                  [ ] Post-partum < 6 weeks                                      (1 Point)             [ ] BMI > 25 Kg/m2                                            (1 Point)                  [ ] Hormonal therapy or oral contraception       (1 Point)                 [ ] Sepsis (in the previous month)                        (1 Point)             [ ] History of pregnancy complications                (1Point)  [ ] Pneumonia or serious lung disease                                             [ ] Unexplained or recurrent  (=/>3), premature                                 (In the previous month)                               (1 Point)                birth with toxemia or growth-restricted infant (1 Point)  [ ] Abnormal pulmonary function test            (1 Point)                                   SURGERY RELATED RISK FACTORS  [ ] Acute myocardial infarction                       (1 Point)                  [ ]  Section                                         (1 Point)  [ ] Congestive heart failure (in the previous month) (1 Point)   [ ] Minor surgery   lasting <45 minutes       (1 Point)   [ ] Inflammatory bowel disease                             (1 Point)          [ ] Arthroscopic surgery                                  (2 Points)  [ ] Central venous access                                    (2 Points)            [ x] General surgery lasting >45 minutes      (2 Points)       [ ] Stroke (in the previous month)                  (5 Points)            [ ] Elective major lower extremity arthroplasty (5 Points)                                   [ x ] Malignancy (present or past include skin melanoma                                          but exclude  basal skin cell)    (2 points)                                      TRAUMA RELATED RISK FACTORS                HEMATOLOGY RELATED FACTORS                                  [ x] Fracture of the hip, pelvis, or leg                       (5 Points)  [ ] Prior episodes of VTE                                     (3 Points)          [ ] Acute spinal cord injury (in the previous month)  (5 Points)  [ ] Positive family history for VTE                         (3 Points)       [ ] Paralysis (less than 1 month)                          (5 Points)  [ ] Prothrombin 04227 A                                      (3 Points)         [ ] Multiple Trauma (within 1month)                 (5Points)                                                                                                                                                                [ ] Factor V Leiden                                          (3 Points)                                OTHER RISK FACTORS                          [ ] Lupus anticoagulants                                     (3 Points)                       [ ] BMI > 40                          (1 Point)                                                         [ ] Anticardiolipin antibodies                                (3 Points)                   [ ] Smoking                              (1Point)                                                [ ] High homocysteine in the blood                      (3 Points)                [  ] Diabetes requiring insulin (1point)                         [ ] Other congenital or acquired thrombophilia       (3 Points)          [  ] Chemotherapy                   (1 Point)  [ ] Heparin induced thrombocytopenia                  (3 Points)             [  ] Blood Transfusion                (1 point)                                                                                                             Total Score [ 10 ]                                                                                                                                                                                                                                                                                                                                                                                                                                         IMPROVE Bleeding Risk Score    Falls Risk:   High ( x )  Mod (  )  Low (  )    Interval Note    EBL:  400ml  crcl 38.2 cr .96 bmi 23.3        Denies any personal or familial history of clotting or bleeding disorders.    Allergies    No Known Allergies    Intolerances        REVIEW OF SYSTEMS    (  )Fever	     (  )Constipation	(  )SOB				(  )Headache	(  )Dysuria  (  )Chills	     (  )Melena	(  )Dyspnea present on exertion	                    (  )Dizziness                    (  )Polyuria  (  )Nausea	     (  )Hematochezia	(  )Cough			                    (  )Syncope   	(  )Hematuria  (  )Vomiting    (  )Chest Pain	(  )Wheezing			(x  )Weakness  (x) pain  (  )Diarrhea     (  )Palpitations	(  )Anorexia			(  )Myalgia    Pertinent positives in HPI and daily subjective.  All other ROS negative.  Vital Signs Last 24 Hrs  T(C): 36.9 (2020 05:38), Max: 37.3 (2020 18:36)  T(F): 98.4 (2020 05:38), Max: 99.2 (2020 18:36)  HR: 81 (2020 05:38) (81 - 88)  BP: 143/52 (2020 05:38) (132/46 - 143/52)  BP(mean): --  RR: 16 (2020 05:38) (16 - 16)  SpO2: 93% (2020 05:38) (93% - 96%)  PHYSICAL EXAM:    Constitutional: Appears Well    Neurological: A& O x 3    Skin: Warm    Respiratory and Thorax: normal effort; Breath sounds: normal; No rales/wheezing/rhonchi  	  Cardiovascular: S1, S2, regular, NMBR	    Gastrointestinal: BS + x 4Q, nontender	    Genitourinary:  Bladder nondistended, nontender    Musculoskeletal:   General Right:   no muscle/joint tenderness,   normal tone, no joint swelling,   ROM: limited	    General Left:   no muscle/joint tenderness,   normal tone, no joint swelling,   ROM: full    Hip:  Right: Dressing CDI    Lower extrems:   Right: no calf tenderness              negative giorgio's sign               + pedal pulses    Left:   no calf tenderness              negative giorgio's sign               + pedal pulses                          8.8    4.66  )-----------( 138      ( 2020 10:14 )             26.1       06-19    139  |  108  |  22  ----------------------------<  238<H>  3.9   |  24  |  0.65    Ca    7.7<L>      2020 06:28                             8.9    4.21  )-----------( 96       ( 2020 06:28 )             25.8       06-19    139  |  108  |  22  ----------------------------<  238<H>  3.9   |  24  |  0.65    Ca    7.7<L>      2020 06:28                             7.1    5.26  )-----------( 120      ( 2020 10:15 )             20.5       06-18    133<L>  |  102  |  32<H>  ----------------------------<  227<H>  3.8   |  23  |  0.96    Ca    7.2<L>      2020 10:15    TPro  x   /  Alb  2.2<L>  /  TBili  x   /  DBili  x   /  AST  x   /  ALT  x   /  AlkPhos  x   06-16                              10.2   6.52  )-----------( 140      ( 2020 06:41 )             30.4       06-17    138  |  107  |  34<H>  ----------------------------<  194<H>  4.2   |  23  |  0.96    Ca    7.5<L>      2020 06:41    TPro  x   /  Alb  2.2<L>  /  TBili  x   /  DBili  x   /  AST  x   /  ALT  x   /  AlkPhos  x   06-16      PT/INR - ( 2020 11:53 )   PT: 13.0 sec;   INR: 1.17 ratio         PTT - ( 2020 11:53 )  PTT:32.4 sec				    MEDICATIONS  (STANDING):  acetaminophen   Tablet .. 975 milliGRAM(s) Oral every 8 hours  atorvastatin 80 milliGRAM(s) Oral at bedtime  calcium carbonate 1250 mG  + Vitamin D (OsCal 500 + D) 1 Tablet(s) Oral daily  cholecalciferol 1000 Unit(s) Oral daily  enoxaparin Injectable 40 milliGRAM(s) SubCutaneous every 24 hours  famotidine    Tablet 20 milliGRAM(s) Oral every 12 hours  fenofibrate Tablet 145 milliGRAM(s) Oral daily  lactated ringers. 1000 milliLiter(s) IV Continuous <Continuous>  levothyroxine 50 MICROGram(s) Oral daily  losartan 50 milliGRAM(s) Oral daily  polyethylene glycol 3350 17 Gram(s) Oral daily        DVT Prophylaxis:  LMWH                   ( x )  Heparin SQ           (  )  Coumadin             (  )  Xarelto                  (  )  Eliquis                   (  )  Venodynes           (x  )  Ambulation          ( x )  UFH                       (  )  Contraindicated  (  )  EC Aspirin             (  )

## 2020-06-20 NOTE — PROGRESS NOTE ADULT - ASSESSMENT
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 Pt now s/p right femur IMN  on 6-.  Pt has high thrombosis risk and requires anticoagulation prophylaxis.     6- Discussed with pt the use of lovenox as her anticoagulation medication and she is welling to us it.   6- Spoke with Dr Zuniga concerning decreased plts will monitor one more day.     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 Pt now s/p right femur IMN  on 6-.  Pt has high thrombosis risk and requires anticoagulation prophylaxis.     6- Discussed with pt the use of lovenox as her anticoagulation medication and she is welling to us it.   6- Spoke with Dr Zuniga concerning decreased plts will monitor one more day.     Plan:  : Continue Lovenox 40mg sq daily for four weeks post procedure  :daily cbc/bmp  plts and H&H stable  :LE Venodynes  : increase mobility as tolerated  : will f/u  Dispo:rehab

## 2020-06-20 NOTE — PROGRESS NOTE ADULT - SUBJECTIVE AND OBJECTIVE BOX
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 She woke up feeling fine, hadn't eaten breakfast yet, when she made a phone call to get her car inspected. While on the telephone, she thinks she either lost her balance or may have felt dizzy and she fell backwards injuring her right hip. No LOC. She was subsequently unable to get up and brought to the ER. In ED found to have right subtrochanteric hip fracture.  She underwent IM nail 6/16.   She required prbcs post op for anemia related to surgery.     6/20: pt seen and examined this am. Felt ok. pain controlled. no sob/chest pain.    ROS: all 10 systems reviewed and is as above otherwise negative.     Vital Signs Last 24 Hrs  T(C): 37.5 (20 Jun 2020 10:20), Max: 37.5 (20 Jun 2020 10:20)  T(F): 99.5 (20 Jun 2020 10:20), Max: 99.5 (20 Jun 2020 10:20)  HR: 73 (20 Jun 2020 10:20) (73 - 88)  BP: 110/46 (20 Jun 2020 10:20) (110/46 - 143/52)  RR: 16 (20 Jun 2020 10:20) (16 - 16)  SpO2: 97% (20 Jun 2020 10:20) (93% - 97%)    PHYSICAL EXAM:    GENERAL: Comfortable, no acute distress   HEAD:  Normocephalic, atraumatic  EYES: EOMI, PERRLA  HEENT: Moist mucous membranes  NECK: Supple, No JVD  NERVOUS SYSTEM:  Alert & Oriented X3, grossly non focal.   CHEST/LUNG: Clear to auscultation bilaterally  HEART: Regular rate and rhythm  ABDOMEN: Soft, Nontender, Nondistended, Bowel sounds present  GENITOURINARY: Voiding, no palpable bladder  EXTREMITIES:   No clubbing, cyanosis, or edema  MUSCULOSKELETAL- Right thigh hematoma+. Hip dressing c/d/i  SKIN-no rash      LABS:                        8.8    4.66  )-----------( 138      ( 20 Jun 2020 10:14 )             26.1     06-19    139  |  108  |  22  ----------------------------<  238<H>  3.9   |  24  |  0.65    Ca    7.7<L>      19 Jun 2020 06:28    MEDICATIONS  (STANDING):  acetaminophen   Tablet .. 975 milliGRAM(s) Oral every 8 hours  atorvastatin 80 milliGRAM(s) Oral at bedtime  calcium carbonate 1250 mG  + Vitamin D (OsCal 500 + D) 1 Tablet(s) Oral daily  cholecalciferol 1000 Unit(s) Oral daily  enoxaparin Injectable 40 milliGRAM(s) SubCutaneous every 24 hours  famotidine    Tablet 20 milliGRAM(s) Oral every 12 hours  fenofibrate Tablet 145 milliGRAM(s) Oral daily  lactated ringers. 1000 milliLiter(s) (75 mL/Hr) IV Continuous <Continuous>  levothyroxine 50 MICROGram(s) Oral daily  losartan 50 milliGRAM(s) Oral daily  polyethylene glycol 3350 17 Gram(s) Oral daily    MEDICATIONS  (PRN):  melatonin 3 milliGRAM(s) Oral at bedtime PRN Insomnia  morphine  - Injectable 2 milliGRAM(s) SubCutaneous every 4 hours PRN Severe Pain (7 - 10)  ondansetron Injectable 4 milliGRAM(s) IV Push every 6 hours PRN Nausea and/or Vomiting  oxyCODONE    IR 5 milliGRAM(s) Oral every 4 hours PRN Moderate Pain (4 - 6)    Assessment/Plan  #S/p mechanical Fall with Right hip fracture s/p IMN POD#4.  #anemia acute blood loss from fracture/postop  -transfused yesterday with good response.  -physical therapy  -incentive spirometry  -bowel regimen.     #Thrombocytopenia:  -likely related to consumption  -better today.    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     # Hypothyroid:  -synthroid    # DVT px-   lovenox x4 weeks    #DIspo:  [facility] today

## 2020-06-23 DIAGNOSIS — H35.30 UNSPECIFIED MACULAR DEGENERATION: ICD-10-CM

## 2020-06-23 DIAGNOSIS — Z85.3 PERSONAL HISTORY OF MALIGNANT NEOPLASM OF BREAST: ICD-10-CM

## 2020-06-23 DIAGNOSIS — Z92.21 PERSONAL HISTORY OF ANTINEOPLASTIC CHEMOTHERAPY: ICD-10-CM

## 2020-06-23 DIAGNOSIS — H26.9 UNSPECIFIED CATARACT: ICD-10-CM

## 2020-06-23 DIAGNOSIS — D46.9 MYELODYSPLASTIC SYNDROME, UNSPECIFIED: ICD-10-CM

## 2020-06-23 DIAGNOSIS — D62 ACUTE POSTHEMORRHAGIC ANEMIA: ICD-10-CM

## 2020-06-23 DIAGNOSIS — Z90.12 ACQUIRED ABSENCE OF LEFT BREAST AND NIPPLE: ICD-10-CM

## 2020-06-23 DIAGNOSIS — Z79.82 LONG TERM (CURRENT) USE OF ASPIRIN: ICD-10-CM

## 2020-06-23 DIAGNOSIS — E78.5 HYPERLIPIDEMIA, UNSPECIFIED: ICD-10-CM
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
## 2020-06-23 DIAGNOSIS — I10 ESSENTIAL (PRIMARY) HYPERTENSION: ICD-10-CM

## 2020-06-23 DIAGNOSIS — F32.9 MAJOR DEPRESSIVE DISORDER, SINGLE EPISODE, UNSPECIFIED: ICD-10-CM

## 2020-06-23 DIAGNOSIS — S72.21XA DISPLACED SUBTROCHANTERIC FRACTURE OF RIGHT FEMUR, INITIAL ENCOUNTER FOR CLOSED FRACTURE: ICD-10-CM

## 2020-06-23 DIAGNOSIS — E03.9 HYPOTHYROIDISM, UNSPECIFIED: ICD-10-CM
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
## 2020-06-23 DIAGNOSIS — D69.6 THROMBOCYTOPENIA, UNSPECIFIED: ICD-10-CM
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
## 2020-10-24 ENCOUNTER — OUTPATIENT (OUTPATIENT)
Dept: OUTPATIENT SERVICES | Facility: HOSPITAL | Age: 85
LOS: 1 days | End: 2020-10-24
Payer: MEDICARE

## 2020-10-24 DIAGNOSIS — Z98.890 OTHER SPECIFIED POSTPROCEDURAL STATES: Chronic | ICD-10-CM

## 2020-10-24 DIAGNOSIS — Z11.59 ENCOUNTER FOR SCREENING FOR OTHER VIRAL DISEASES: ICD-10-CM

## 2020-10-24 LAB — SARS-COV-2 RNA SPEC QL NAA+PROBE: SIGNIFICANT CHANGE UP

## 2020-10-24 PROCEDURE — U0003: CPT

## 2020-10-25 DIAGNOSIS — Z11.59 ENCOUNTER FOR SCREENING FOR OTHER VIRAL DISEASES: ICD-10-CM

## 2020-10-26 RX ORDER — CYCLOPENTOLATE HYDROCHLORIDE 10 MG/ML
1 SOLUTION/ DROPS OPHTHALMIC
Refills: 0 | Status: DISCONTINUED | OUTPATIENT
Start: 2020-10-27 | End: 2020-10-27

## 2020-10-26 RX ORDER — OFLOXACIN 0.3 %
1 DROPS OPHTHALMIC (EYE)
Refills: 0 | Status: DISCONTINUED | OUTPATIENT
Start: 2020-10-27 | End: 2020-10-27

## 2020-10-26 RX ORDER — PHENYLEPHRINE HCL 2.5 %
1 DROPS OPHTHALMIC (EYE)
Refills: 0 | Status: DISCONTINUED | OUTPATIENT
Start: 2020-10-27 | End: 2020-10-27

## 2020-10-26 RX ORDER — TROPICAMIDE 1 %
1 DROPS OPHTHALMIC (EYE)
Refills: 0 | Status: DISCONTINUED | OUTPATIENT
Start: 2020-10-27 | End: 2020-10-27

## 2020-10-26 RX ORDER — SODIUM CHLORIDE 9 MG/ML
1000 INJECTION, SOLUTION INTRAVENOUS
Refills: 0 | Status: DISCONTINUED | OUTPATIENT
Start: 2020-10-27 | End: 2020-10-27

## 2020-10-26 NOTE — ASU PATIENT PROFILE, ADULT - PSH
H/O lumpectomy  Left side in 2007, performed by Dr. Samuels at    Fracture of femur  right ac right  H/O lumpectomy  Left side in 2007, performed by Dr. Samuels at

## 2020-10-27 ENCOUNTER — OUTPATIENT (OUTPATIENT)
Dept: INPATIENT UNIT | Facility: HOSPITAL | Age: 85
LOS: 1 days | Discharge: HOME | End: 2020-10-27
Payer: MEDICARE

## 2020-10-27 VITALS
OXYGEN SATURATION: 99 % | TEMPERATURE: 98 F | SYSTOLIC BLOOD PRESSURE: 121 MMHG | HEART RATE: 72 BPM | RESPIRATION RATE: 18 BRPM | HEIGHT: 60 IN | DIASTOLIC BLOOD PRESSURE: 67 MMHG | WEIGHT: 119.05 LBS

## 2020-10-27 VITALS
DIASTOLIC BLOOD PRESSURE: 61 MMHG | TEMPERATURE: 97 F | OXYGEN SATURATION: 100 % | HEART RATE: 71 BPM | SYSTOLIC BLOOD PRESSURE: 133 MMHG | RESPIRATION RATE: 16 BRPM

## 2020-10-27 DIAGNOSIS — H25.12 AGE-RELATED NUCLEAR CATARACT, LEFT EYE: ICD-10-CM

## 2020-10-27 DIAGNOSIS — H35.3212 EXUDATIVE AGE-RELATED MACULAR DEGENERATION, RIGHT EYE, WITH INACTIVE CHOROIDAL NEOVASCULARIZATION: ICD-10-CM

## 2020-10-27 DIAGNOSIS — H25.13 AGE-RELATED NUCLEAR CATARACT, BILATERAL: ICD-10-CM

## 2020-10-27 DIAGNOSIS — Z85.6 PERSONAL HISTORY OF LEUKEMIA: ICD-10-CM

## 2020-10-27 DIAGNOSIS — Z90.710 ACQUIRED ABSENCE OF BOTH CERVIX AND UTERUS: ICD-10-CM

## 2020-10-27 DIAGNOSIS — S72.90XA UNSPECIFIED FRACTURE OF UNSPECIFIED FEMUR, INITIAL ENCOUNTER FOR CLOSED FRACTURE: Chronic | ICD-10-CM

## 2020-10-27 DIAGNOSIS — H26.9 UNSPECIFIED CATARACT: ICD-10-CM

## 2020-10-27 DIAGNOSIS — Z98.890 OTHER SPECIFIED POSTPROCEDURAL STATES: Chronic | ICD-10-CM

## 2020-10-27 DIAGNOSIS — E11.36 TYPE 2 DIABETES MELLITUS WITH DIABETIC CATARACT: ICD-10-CM

## 2020-10-27 DIAGNOSIS — Z79.82 LONG TERM (CURRENT) USE OF ASPIRIN: ICD-10-CM

## 2020-10-27 PROCEDURE — V2632: CPT

## 2020-10-27 RX ORDER — ACETAMINOPHEN 500 MG
650 TABLET ORAL EVERY 6 HOURS
Refills: 0 | Status: DISCONTINUED | OUTPATIENT
Start: 2020-10-27 | End: 2020-10-27

## 2020-10-27 RX ORDER — OFLOXACIN 0.3 %
1 DROPS OPHTHALMIC (EYE)
Qty: 0 | Refills: 0 | DISCHARGE
Start: 2020-10-27

## 2020-10-27 RX ADMIN — Medication 1 DROP(S): at 12:27

## 2020-10-27 RX ADMIN — Medication 1 DROP(S): at 12:21

## 2020-10-27 RX ADMIN — CYCLOPENTOLATE HYDROCHLORIDE 1 DROP(S): 10 SOLUTION/ DROPS OPHTHALMIC at 12:22

## 2020-10-27 RX ADMIN — Medication 1 DROP(S): at 12:22

## 2020-10-27 RX ADMIN — Medication 1 DROP(S): at 12:26

## 2020-10-27 RX ADMIN — CYCLOPENTOLATE HYDROCHLORIDE 1 DROP(S): 10 SOLUTION/ DROPS OPHTHALMIC at 12:27

## 2020-10-28 DIAGNOSIS — H25.12 AGE-RELATED NUCLEAR CATARACT, LEFT EYE: ICD-10-CM

## 2020-12-03 ENCOUNTER — INPATIENT (INPATIENT)
Facility: HOSPITAL | Age: 85
LOS: 3 days | Discharge: NURSING, REHAB OR LONG-TERM CARE | DRG: 536 | End: 2020-12-07
Attending: INTERNAL MEDICINE | Admitting: INTERNAL MEDICINE
Payer: MEDICARE

## 2020-12-03 VITALS — HEIGHT: 60 IN | WEIGHT: 119.05 LBS

## 2020-12-03 DIAGNOSIS — Z98.890 OTHER SPECIFIED POSTPROCEDURAL STATES: Chronic | ICD-10-CM

## 2020-12-03 DIAGNOSIS — R26.2 DIFFICULTY IN WALKING, NOT ELSEWHERE CLASSIFIED: ICD-10-CM

## 2020-12-03 DIAGNOSIS — S72.90XA UNSPECIFIED FRACTURE OF UNSPECIFIED FEMUR, INITIAL ENCOUNTER FOR CLOSED FRACTURE: Chronic | ICD-10-CM

## 2020-12-03 LAB
ANION GAP SERPL CALC-SCNC: 5 MMOL/L — SIGNIFICANT CHANGE UP (ref 5–17)
APPEARANCE UR: CLEAR — SIGNIFICANT CHANGE UP
APTT BLD: 36.7 SEC — HIGH (ref 27.5–35.5)
BASOPHILS # BLD AUTO: 0.11 K/UL — SIGNIFICANT CHANGE UP (ref 0–0.2)
BASOPHILS NFR BLD AUTO: 2.1 % — HIGH (ref 0–2)
BILIRUB UR-MCNC: NEGATIVE — SIGNIFICANT CHANGE UP
BUN SERPL-MCNC: 28 MG/DL — HIGH (ref 7–23)
CALCIUM SERPL-MCNC: 9.1 MG/DL — SIGNIFICANT CHANGE UP (ref 8.5–10.1)
CHLORIDE SERPL-SCNC: 105 MMOL/L — SIGNIFICANT CHANGE UP (ref 96–108)
CO2 SERPL-SCNC: 28 MMOL/L — SIGNIFICANT CHANGE UP (ref 22–31)
COLOR SPEC: YELLOW — SIGNIFICANT CHANGE UP
CREAT SERPL-MCNC: 0.74 MG/DL — SIGNIFICANT CHANGE UP (ref 0.5–1.3)
DIFF PNL FLD: NEGATIVE — SIGNIFICANT CHANGE UP
EOSINOPHIL # BLD AUTO: 0.25 K/UL — SIGNIFICANT CHANGE UP (ref 0–0.5)
EOSINOPHIL NFR BLD AUTO: 4.9 % — SIGNIFICANT CHANGE UP (ref 0–6)
ERYTHROCYTE [SEDIMENTATION RATE] IN BLOOD: 43 MM/HR — HIGH (ref 0–20)
GLUCOSE SERPL-MCNC: 101 MG/DL — HIGH (ref 70–99)
GLUCOSE UR QL: NEGATIVE MG/DL — SIGNIFICANT CHANGE UP
HCT VFR BLD CALC: 34.1 % — LOW (ref 34.5–45)
HGB BLD-MCNC: 10.8 G/DL — LOW (ref 11.5–15.5)
IMM GRANULOCYTES NFR BLD AUTO: 0.4 % — SIGNIFICANT CHANGE UP (ref 0–1.5)
INR BLD: 1.18 RATIO — HIGH (ref 0.88–1.16)
KETONES UR-MCNC: NEGATIVE — SIGNIFICANT CHANGE UP
LEUKOCYTE ESTERASE UR-ACNC: NEGATIVE — SIGNIFICANT CHANGE UP
LYMPHOCYTES # BLD AUTO: 1.02 K/UL — SIGNIFICANT CHANGE UP (ref 1–3.3)
LYMPHOCYTES # BLD AUTO: 19.9 % — SIGNIFICANT CHANGE UP (ref 13–44)
MCHC RBC-ENTMCNC: 30.5 PG — SIGNIFICANT CHANGE UP (ref 27–34)
MCHC RBC-ENTMCNC: 31.7 GM/DL — LOW (ref 32–36)
MCV RBC AUTO: 96.3 FL — SIGNIFICANT CHANGE UP (ref 80–100)
MONOCYTES # BLD AUTO: 0.54 K/UL — SIGNIFICANT CHANGE UP (ref 0–0.9)
MONOCYTES NFR BLD AUTO: 10.5 % — SIGNIFICANT CHANGE UP (ref 2–14)
NEUTROPHILS # BLD AUTO: 3.18 K/UL — SIGNIFICANT CHANGE UP (ref 1.8–7.4)
NEUTROPHILS NFR BLD AUTO: 62.2 % — SIGNIFICANT CHANGE UP (ref 43–77)
NITRITE UR-MCNC: NEGATIVE — SIGNIFICANT CHANGE UP
PH UR: 6.5 — SIGNIFICANT CHANGE UP (ref 5–8)
PLATELET # BLD AUTO: 528 K/UL — HIGH (ref 150–400)
POTASSIUM SERPL-MCNC: 4.2 MMOL/L — SIGNIFICANT CHANGE UP (ref 3.5–5.3)
POTASSIUM SERPL-SCNC: 4.2 MMOL/L — SIGNIFICANT CHANGE UP (ref 3.5–5.3)
PROT UR-MCNC: 15 MG/DL
PROTHROM AB SERPL-ACNC: 13.6 SEC — SIGNIFICANT CHANGE UP (ref 10.6–13.6)
RBC # BLD: 3.54 M/UL — LOW (ref 3.8–5.2)
RBC # FLD: 14.1 % — SIGNIFICANT CHANGE UP (ref 10.3–14.5)
SARS-COV-2 RNA SPEC QL NAA+PROBE: SIGNIFICANT CHANGE UP
SODIUM SERPL-SCNC: 138 MMOL/L — SIGNIFICANT CHANGE UP (ref 135–145)
SP GR SPEC: 1.01 — SIGNIFICANT CHANGE UP (ref 1.01–1.02)
UROBILINOGEN FLD QL: NEGATIVE MG/DL — SIGNIFICANT CHANGE UP
WBC # BLD: 5.12 K/UL — SIGNIFICANT CHANGE UP (ref 3.8–10.5)
WBC # FLD AUTO: 5.12 K/UL — SIGNIFICANT CHANGE UP (ref 3.8–10.5)

## 2020-12-03 PROCEDURE — 73700 CT LOWER EXTREMITY W/O DYE: CPT | Mod: RT

## 2020-12-03 PROCEDURE — 85027 COMPLETE CBC AUTOMATED: CPT

## 2020-12-03 PROCEDURE — 73552 X-RAY EXAM OF FEMUR 2/>: CPT | Mod: 26,RT

## 2020-12-03 PROCEDURE — 81001 URINALYSIS AUTO W/SCOPE: CPT

## 2020-12-03 PROCEDURE — 87086 URINE CULTURE/COLONY COUNT: CPT

## 2020-12-03 PROCEDURE — 76376 3D RENDER W/INTRP POSTPROCES: CPT

## 2020-12-03 PROCEDURE — 72192 CT PELVIS W/O DYE: CPT | Mod: 26

## 2020-12-03 PROCEDURE — 72192 CT PELVIS W/O DYE: CPT

## 2020-12-03 PROCEDURE — 73552 X-RAY EXAM OF FEMUR 2/>: CPT | Mod: RT

## 2020-12-03 PROCEDURE — 73700 CT LOWER EXTREMITY W/O DYE: CPT | Mod: 26,RT

## 2020-12-03 PROCEDURE — 97116 GAIT TRAINING THERAPY: CPT | Mod: GP

## 2020-12-03 PROCEDURE — 80048 BASIC METABOLIC PNL TOTAL CA: CPT

## 2020-12-03 PROCEDURE — 71045 X-RAY EXAM CHEST 1 VIEW: CPT | Mod: 26

## 2020-12-03 PROCEDURE — 76376 3D RENDER W/INTRP POSTPROCES: CPT | Mod: 26

## 2020-12-03 PROCEDURE — 73503 X-RAY EXAM HIP UNI 4/> VIEWS: CPT | Mod: 26,RT

## 2020-12-03 PROCEDURE — U0003: CPT

## 2020-12-03 PROCEDURE — 97530 THERAPEUTIC ACTIVITIES: CPT | Mod: GP

## 2020-12-03 PROCEDURE — 93010 ELECTROCARDIOGRAM REPORT: CPT

## 2020-12-03 PROCEDURE — 36415 COLL VENOUS BLD VENIPUNCTURE: CPT

## 2020-12-03 RX ORDER — MORPHINE SULFATE 50 MG/1
4 CAPSULE, EXTENDED RELEASE ORAL ONCE
Refills: 0 | Status: DISCONTINUED | OUTPATIENT
Start: 2020-12-03 | End: 2020-12-03

## 2020-12-03 RX ORDER — ACETAMINOPHEN 500 MG
650 TABLET ORAL EVERY 6 HOURS
Refills: 0 | Status: DISCONTINUED | OUTPATIENT
Start: 2020-12-03 | End: 2020-12-07

## 2020-12-03 RX ORDER — ACETAMINOPHEN 500 MG
2 TABLET ORAL
Qty: 0 | Refills: 0 | DISCHARGE

## 2020-12-03 RX ORDER — CYCLOBENZAPRINE HYDROCHLORIDE 10 MG/1
10 TABLET, FILM COATED ORAL ONCE
Refills: 0 | Status: COMPLETED | OUTPATIENT
Start: 2020-12-03 | End: 2020-12-03

## 2020-12-03 RX ORDER — ACETAMINOPHEN 500 MG
1000 TABLET ORAL ONCE
Refills: 0 | Status: COMPLETED | OUTPATIENT
Start: 2020-12-03 | End: 2020-12-03

## 2020-12-03 RX ORDER — TRAMADOL HYDROCHLORIDE 50 MG/1
25 TABLET ORAL
Qty: 0 | Refills: 0 | DISCHARGE

## 2020-12-03 RX ORDER — IBUPROFEN 200 MG
400 TABLET ORAL ONCE
Refills: 0 | Status: COMPLETED | OUTPATIENT
Start: 2020-12-03 | End: 2020-12-03

## 2020-12-03 RX ADMIN — Medication 400 MILLIGRAM(S): at 21:09

## 2020-12-03 RX ADMIN — MORPHINE SULFATE 4 MILLIGRAM(S): 50 CAPSULE, EXTENDED RELEASE ORAL at 21:04

## 2020-12-03 RX ADMIN — Medication 1000 MILLIGRAM(S): at 18:52

## 2020-12-03 RX ADMIN — CYCLOBENZAPRINE HYDROCHLORIDE 10 MILLIGRAM(S): 10 TABLET, FILM COATED ORAL at 21:09

## 2020-12-03 RX ADMIN — Medication 400 MILLIGRAM(S): at 21:00

## 2020-12-03 RX ADMIN — Medication 1000 MILLIGRAM(S): at 21:04

## 2020-12-03 RX ADMIN — MORPHINE SULFATE 4 MILLIGRAM(S): 50 CAPSULE, EXTENDED RELEASE ORAL at 18:51

## 2020-12-03 NOTE — ED PROVIDER NOTE - CLINICAL SUMMARY MEDICAL DECISION MAKING FREE TEXT BOX
concern for pathologic Unable to bear weight on right LE, concern for pathologic fracture, less likely septic arthritis. XR, labs, reassess.

## 2020-12-03 NOTE — PROGRESS NOTE ADULT - ASSESSMENT
Assessment/Plan:  85y Female with subacute left pubic rami fracture and right hamstring spasm / piriformis syndrome + inability to ambulate       -No acute fracture noted at this time  Followed by Dr. Salinas for Left subacute pubic rami fracture   -continue WBAT for pubic rami fx / follow up W dr. Salinas 1 week after D/c  Left hamstring spasm / piriformis syndrome  -Recommend flexaril as muscle relaxant  If admitted to medicine for inability to ambulate -   -Pain control/anti-inflmmatory as needed per medicine - limit narcs  -DVT ppx per medicine   -WBAT / PT / OT  -No acute orthopedic surgical intervention needed at this time  -Will d/w Dr. Salinas and advise if plan changes   -Ortho stable for discharge

## 2020-12-03 NOTE — ED ADULT NURSE NOTE - OBJECTIVE STATEMENT
Pt presents to er with complaints of increased pain to pelvis after falling, denies chest pain, sob, states she came in because the pain was very bad last night and wanted to come in for evaluation, respirations unlabored, safety maintained, will continue to monitor.

## 2020-12-03 NOTE — ED PROVIDER NOTE - OBJECTIVE STATEMENT
Pertinent HPI/PMH/PSH/FHx/SHx and Review of Systems contained within  HPI:  Patient p/w CC difficulty ambulating due to right hip pain, x2 weeks, worsening. Pt went for chemo treatment today, and was sent to ED because pt was having difficulty ambulating, even with a walker. Pt normally self-ambulates. Pt was not fallen since x2 weeks ago. NKDA. PMD: Dr. Silva.   PMH/PSH relevant for: acute promyelocytic leukemia, anemia, cataracts, HTN, HLD, hypothyroid, macular degeneration, breast CA s/p mastectomy/chemo, pre-DM, h/o right subtrochanteric hip fracture repair 06/2020, s/p pelvis fracture x2 weeks ago (seen in Dr. Salinas's office)  ROS negative for: fever, Chest pain, SOB, Nausea, vomiting, diarrhea, abdominal pain, dysuria    FamilyHx and SocialHx not otherwise contributory

## 2020-12-03 NOTE — ED PROVIDER NOTE - PHYSICAL EXAMINATION
*GEN: No acute distress, well appearing   *HEAD: Normocephalic, Atraumatic  *EYES/NOSE: b/l Pupils symmetric & Reactive to ligth, EOMI b/l  *THROAT: airway patent, moist mucous membranes  *NECK: Neck supple  *PULMONARY: No Respiratory distress, symmetric b/l chest rise  *CARDIAC: s1s2, regular rhythm   *ABDOMEN:  Non Tender, Non Distended, soft, no guarding, no rebound, no masses   *BACK: no CVA tenderness, No midline vertebral tenderness to palpation   *EXTREMITIES: symmetric pulses, 2+ DP & radial pulses, no cyanosis, no edema +unable to bear work on right LE  *SKIN: no rash, no bruising   *NEUROLOGIC: alert,  full active & passive ROM in all 4 extremities,   *PSYCH: appropriate concern about symptoms, pleasant *GEN: No acute distress, well appearing   *HEAD: Normocephalic, Atraumatic  *EYES/NOSE: b/l Pupils symmetric & Reactive to ligth, EOMI b/l  *THROAT: airway patent, moist mucous membranes  *NECK: Neck supple  *PULMONARY: No Respiratory distress, symmetric b/l chest rise  *CARDIAC: s1s2, regular rhythm   *ABDOMEN:  Non Tender, Non Distended, soft, no guarding, no rebound, no masses   *BACK: no CVA tenderness, No midline vertebral tenderness to palpation   *EXTREMITIES: symmetric pulses, 2+ DP & radial pulses, no cyanosis, no edema +unable to bear weight on right LE  *SKIN: no rash, no bruising   *NEUROLOGIC: alert,  full active & passive ROM in all 4 extremities,   *PSYCH: appropriate concern about symptoms, pleasant

## 2020-12-03 NOTE — ED PROVIDER NOTE - NS ED ROS FT
Review of Systems:  	•	CONSTITUTIONAL: no fever  	•	SKIN: no rash  	•	RESPIRATORY: no shortness of breath  	•	CARDIAC: no chest pain  	•	GI:  no abd pain, no nausea, no vomiting, no diarrhea  	•	GENITO-URINARY:  no dysuria  	•	MUSCULOSKELETAL:  no back pain +right hip pain   	•	NEUROLOGIC: no weakness  	•	ALLERGY: no rhinorrhea  	•	PSYSCHIATRIC: appropriate concern about symptoms

## 2020-12-03 NOTE — ED STATDOCS - PROGRESS NOTE DETAILS
Carlos Henry for attending Dr. Mclean: 84 y/o female with a PMHx of acute promyelocytic leukemia, anemia, cataracts, HTN, HLD, hypothyroid, macular degeneration, h/o right femur fracture repair 06/2020 presents to the ED c/o difficulty ambulating secondary to right hip pain. Pt can not ambulate, can not even stand from wheelchair. Will send pt to main ED for further evaluation.

## 2020-12-03 NOTE — ED ADULT NURSE NOTE - NSIMPLEMENTINTERV_GEN_ALL_ED
Implemented All Fall Risk Interventions:  [place] to call system. Call bell, personal items and telephone within reach. Instruct patient to call for assistance. Room bathroom lighting operational. Non-slip footwear when patient is off stretcher. Physically safe environment: no spills, clutter or unnecessary equipment. Stretcher in lowest position, wheels locked, appropriate side rails in place. Provide visual cue, wrist band, yellow gown, etc. Monitor gait and stability. Monitor for mental status changes and reorient to person, place, and time. Review medications for side effects contributing to fall risk. Reinforce activity limits and safety measures with patient and family.

## 2020-12-03 NOTE — ED ADULT TRIAGE NOTE - CHIEF COMPLAINT QUOTE
sent in by dr. thomas's office for difficulty walking secondary to 2 left pelvic fracture from 2 weeks ago s/p fall, pt has been on chemotherapy for pre-leukemia as per daughter HX: left breast mastectomy and right femur fracture repair 06/2020, [place], cataract surgery, macular degeneration

## 2020-12-03 NOTE — ED ADULT NURSE NOTE - CHIEF COMPLAINT QUOTE
sent in by dr. thomas's office for difficulty walking secondary to 2 left pelvic fracture from 2 weeks ago s/p fall, pt has been on chemotherapy for pre-leukemia as per daughter HX: left breast mastectomy and right femur fracture repair 06/2020, [place], cataract surgery, macular degeneration

## 2020-12-03 NOTE — ED PROVIDER NOTE - CARE PLAN
Principal Discharge DX:	Inability to ambulate due to right hip  Secondary Diagnosis:	Piriformis syndrome of right side

## 2020-12-04 LAB — CRP SERPL-MCNC: 2.5 MG/DL — HIGH (ref 0–0.4)

## 2020-12-04 PROCEDURE — 99222 1ST HOSP IP/OBS MODERATE 55: CPT

## 2020-12-04 PROCEDURE — 12345: CPT | Mod: NC

## 2020-12-04 RX ORDER — LEVOTHYROXINE SODIUM 125 MCG
50 TABLET ORAL DAILY
Refills: 0 | Status: DISCONTINUED | OUTPATIENT
Start: 2020-12-04 | End: 2020-12-07

## 2020-12-04 RX ORDER — HEPARIN SODIUM 5000 [USP'U]/ML
5000 INJECTION INTRAVENOUS; SUBCUTANEOUS EVERY 8 HOURS
Refills: 0 | Status: DISCONTINUED | OUTPATIENT
Start: 2020-12-04 | End: 2020-12-04

## 2020-12-04 RX ORDER — LOSARTAN POTASSIUM 100 MG/1
50 TABLET, FILM COATED ORAL DAILY
Refills: 0 | Status: DISCONTINUED | OUTPATIENT
Start: 2020-12-04 | End: 2020-12-07

## 2020-12-04 RX ORDER — ENOXAPARIN SODIUM 100 MG/ML
40 INJECTION SUBCUTANEOUS DAILY
Refills: 0 | Status: DISCONTINUED | OUTPATIENT
Start: 2020-12-04 | End: 2020-12-07

## 2020-12-04 RX ORDER — ASPIRIN/CALCIUM CARB/MAGNESIUM 324 MG
81 TABLET ORAL DAILY
Refills: 0 | Status: DISCONTINUED | OUTPATIENT
Start: 2020-12-04 | End: 2020-12-07

## 2020-12-04 RX ORDER — ATORVASTATIN CALCIUM 80 MG/1
80 TABLET, FILM COATED ORAL AT BEDTIME
Refills: 0 | Status: DISCONTINUED | OUTPATIENT
Start: 2020-12-04 | End: 2020-12-07

## 2020-12-04 RX ORDER — FENOFIBRATE,MICRONIZED 130 MG
145 CAPSULE ORAL DAILY
Refills: 0 | Status: DISCONTINUED | OUTPATIENT
Start: 2020-12-04 | End: 2020-12-07

## 2020-12-04 RX ORDER — SERTRALINE 25 MG/1
100 TABLET, FILM COATED ORAL DAILY
Refills: 0 | Status: DISCONTINUED | OUTPATIENT
Start: 2020-12-04 | End: 2020-12-07

## 2020-12-04 RX ORDER — PREGABALIN 225 MG/1
1000 CAPSULE ORAL DAILY
Refills: 0 | Status: DISCONTINUED | OUTPATIENT
Start: 2020-12-04 | End: 2020-12-07

## 2020-12-04 RX ORDER — INFLUENZA VIRUS VACCINE 15; 15; 15; 15 UG/.5ML; UG/.5ML; UG/.5ML; UG/.5ML
0.5 SUSPENSION INTRAMUSCULAR ONCE
Refills: 0 | Status: COMPLETED | OUTPATIENT
Start: 2020-12-04 | End: 2020-12-04

## 2020-12-04 RX ADMIN — Medication 650 MILLIGRAM(S): at 21:15

## 2020-12-04 RX ADMIN — Medication 81 MILLIGRAM(S): at 10:36

## 2020-12-04 RX ADMIN — ENOXAPARIN SODIUM 40 MILLIGRAM(S): 100 INJECTION SUBCUTANEOUS at 18:08

## 2020-12-04 RX ADMIN — Medication 145 MILLIGRAM(S): at 10:36

## 2020-12-04 RX ADMIN — Medication 50 MICROGRAM(S): at 05:56

## 2020-12-04 RX ADMIN — Medication 650 MILLIGRAM(S): at 21:45

## 2020-12-04 RX ADMIN — ATORVASTATIN CALCIUM 80 MILLIGRAM(S): 80 TABLET, FILM COATED ORAL at 21:16

## 2020-12-04 RX ADMIN — SERTRALINE 100 MILLIGRAM(S): 25 TABLET, FILM COATED ORAL at 10:36

## 2020-12-04 RX ADMIN — LOSARTAN POTASSIUM 50 MILLIGRAM(S): 100 TABLET, FILM COATED ORAL at 10:36

## 2020-12-04 RX ADMIN — PREGABALIN 1000 MICROGRAM(S): 225 CAPSULE ORAL at 10:36

## 2020-12-04 NOTE — PROGRESS NOTE ADULT - SUBJECTIVE AND OBJECTIVE BOX
Ilda Silva    Patient is a 85y old  Female who presents with a chief complaint of Right Hip pain (04 Dec 2020 01:45)        HPI:  86 y/o Female with a PMHx of acute promyelocytic leukemia, anemia, cataracts, HTN, HLD, hypothyroid, macular degeneration, h/o right femur fracture repair 06/2020 presents to the ED with complain of difficulty ambulating secondary to right hip pain. Patient can not ambulate, can not even stand from wheelchair. Patient was having difficulty ambulating, even with a walker. Patient normally self-ambulates. No fall in last 2-3 weeks. No injury or trauma. No LOC. No fever. No other complain today. No other joint pain.     12/4:  seen at bedside, without c/o, awaiting PT    Review of system- Rest of the review of system are normal excpet mentioned in HPI    SUBJECTIVE & OBJECTIVE:  No new complaint    Vital Signs Last 24 Hrs  T(C): 36.7 (12-04-20 @ 09:53), Max: 37.8 (12-03-20 @ 16:50)  T(F): 98 (12-04-20 @ 09:53), Max: 100 (12-03-20 @ 16:50)  HR: 79 (12-04-20 @ 09:53) (78 - 85)  BP: 145/70 (12-04-20 @ 09:53) (101/38 - 145/70)  BP(mean): 86 (12-04-20 @ 09:53) (68 - 86)  RR: 16 (12-04-20 @ 09:53) (16 - 20)  SpO2: 97% (12-04-20 @ 09:53) (95% - 97%)          PHYSICAL EXAM:    GENERAL: NAD, well-groomed, well-developed  HEAD:  Atraumatic, Normocephalic  EYES: EOMI, PERRLA, conjunctiva and sclera clear  ENMT: Moist mucous membranes  NECK: Supple, No JVD  NERVOUS SYSTEM:  Alert & Oriented X3, Motor Strength 5/5 B/L upper and lower extremities; DTRs 2+ intact and symmetric  CHEST/LUNG: Clear to auscultation bilaterally; No rales, rhonchi, wheezing, or rubs  HEART: Regular rate and rhythm; No murmurs, rubs, or gallops  ABDOMEN: Soft, Nontender, Nondistended; Bowel sounds present  GENITOURINARY- Voiding, no palpable bladder  EXTREMITIES:  2+ Peripheral Pulses, No clubbing, cyanosis, or edema  MUSCULOSKELETAL- No muscle tenderness, Muscle tone normal, No joint tenderness, no Joint swelling, Joint range of motion-normal  SKIN-no rash, no lesion          LABS:                                        10.8   5.12  )-----------( 528      ( 03 Dec 2020 16:40 )             34.1       12-03    138  |  105  |  28<H>  ----------------------------<  101<H>  4.2   |  28  |  0.74    Ca    9.1      03 Dec 2020 16:40        PT/INR - ( 03 Dec 2020 16:40 )   PT: 13.6 sec;   INR: 1.18 ratio         PTT - ( 03 Dec 2020 16:40 )  PTT:36.7 sec      RADIOLOGY & ADDITIONAL TESTS:      EXAM:  CT PELVIS BONY ONLY                          EXAM:  [facility]                          EXAM:  CT FEMUR ONLY RT                          EXAM:  [facility]                            PROCEDURE DATE:  12/03/2020          INTERPRETATION:  CT OF THE PELVIS AND RIGHT FEMUR    CLINICAL INFORMATION: Pelvic and right thigh pain. Inability to ambulate. Question fracture.  TECHNIQUE: Multidetector CT of the pelvis and right femur. The study was performed without the use of intravenous or intra-articular contrast. Multiplanar reformats were generated for review. Three dimensional reconstructions were obtained on an independent workstation. The interpretation of these images is included in the body of the report of the main portion of the study.    COMPARISON: Pelvis and right hip radiographs 3 December 2020 and 16 June 2020.    FINDINGS:    HARDWARE: Patient status post intramedullary ac and cephalomedullary screw fixation of the right femur. A distal transfixion screw is identified. 3 cerclage wires are seen surrounding the proximal subtrochanteric femur. The hardware is intact without periprosthetic lucency to suggest loosening.    BONE: Nondisplaced subacute fracture of the right sacral ala (2:45-70, 602:56). Subacute fracture of the superior ramus, inferior ramus, and body of the left pubis mild surrounding callus formation. Healing spiral fracture of the right proximal femur. No acute fractures are identified. Scattered bone islands are identified.  No dislocation. Multilevel spondylosis of the imaged lumbar spine. Degenerative change of the bilateral sacroiliac joints. Degeneration of the pubic symphysis. Bilateral hip cartilage space narrowing and marginal osteophyte formation.    SOFT TISSUE: Vascular calcifications. Vessels are otherwise normal in course and caliber. Normal appearance of the appendix. Diverticular disease of the colon without evidence of acute diverticulitis. No pelvic free fluid or lymphadenopathy.  Mild swelling and edema of the right piriformis muscle. The muscles are otherwise symmetric in appearance. Scar tissue is identified in the lateral aspect of the right proximal thigh consistent with prior surgical intervention.      IMPRESSION:  1.  No acute fracture. Healing subacute fractures of the right sacral ala, left pubis, and right femur, as described above.  2.  Patient status post ORIF of the right femur. No evidence of hardware complication.  3.  Focal swelling and edema about the right piriformis muscle, suggesting possible piriformis syndrome.      MEDICATIONS  (STANDING):  aspirin enteric coated 81 milliGRAM(s) Oral daily  atorvastatin 80 milliGRAM(s) Oral at bedtime  cyanocobalamin 1000 MICROGram(s) Oral daily  fenofibrate Tablet 145 milliGRAM(s) Oral daily  levothyroxine 50 MICROGram(s) Oral daily  losartan 50 milliGRAM(s) Oral daily  sertraline 100 milliGRAM(s) Oral daily    MEDICATIONS  (PRN):  acetaminophen   Tablet .. 650 milliGRAM(s) Oral every 6 hours PRN Mild Pain (1 - 3)      84 yo Female with above pmh a/w:      1.  Right Hip pain  Difficulty to ambulate  -will keep on fall precautions  -follow PT eval  -follow Orthopedics consult: no interventions:  f/u with Dr Salinas as out pt    2.  HTN  HLD  Hypothyroidism  -continue home medications  -follow clinically    3.  VTE prophylaxis   start lovenox  venodynes     4.  Code status: Patient is in full code status.      dispo : rehab likely             Ilda Silva    Patient is a 85y old  Female who presents with a chief complaint of Right Hip pain (04 Dec 2020 01:45)        HPI:  86 y/o Female with a PMHx of acute promyelocytic leukemia, anemia, cataracts, HTN, HLD, hypothyroid, macular degeneration, h/o right femur fracture repair 06/2020 presents to the ED with complain of difficulty ambulating secondary to right hip pain. Patient can not ambulate, can not even stand from wheelchair. Patient was having difficulty ambulating, even with a walker. Patient normally self-ambulates. No fall in last 2-3 weeks. No injury or trauma. No LOC. No fever. No other complain today. No other joint pain.     12/4:  seen at bedside, without c/o, awaiting PT    Review of system- Rest of the review of system are normal excpet mentioned in HPI    SUBJECTIVE & OBJECTIVE:  No new complaint    Vital Signs Last 24 Hrs  T(C): 36.7 (12-04-20 @ 09:53), Max: 37.8 (12-03-20 @ 16:50)  T(F): 98 (12-04-20 @ 09:53), Max: 100 (12-03-20 @ 16:50)  HR: 79 (12-04-20 @ 09:53) (78 - 85)  BP: 145/70 (12-04-20 @ 09:53) (101/38 - 145/70)  BP(mean): 86 (12-04-20 @ 09:53) (68 - 86)  RR: 16 (12-04-20 @ 09:53) (16 - 20)  SpO2: 97% (12-04-20 @ 09:53) (95% - 97%)    PHYSICAL EXAM:    GENERAL: NAD, well-groomed, well-developed  HEAD:  Atraumatic, Normocephalic  EYES: EOMI, PERRLA, conjunctiva and sclera clear  ENMT: Moist mucous membranes  NECK: Supple, No JVD  NERVOUS SYSTEM:  Alert & Oriented X3, Motor Strength 5/5 B/L upper and lower extremities; DTRs 2+ intact and symmetric  CHEST/LUNG: Clear to auscultation bilaterally; No rales, rhonchi, wheezing, or rubs  HEART: Regular rate and rhythm; No murmurs, rubs, or gallops  ABDOMEN: Soft, Nontender, Nondistended; Bowel sounds present  GENITOURINARY- Voiding, no palpable bladder  EXTREMITIES:  2+ Peripheral Pulses, No clubbing, cyanosis, or edema  MUSCULOSKELETAL- No muscle tenderness, Muscle tone normal, No joint tenderness, no Joint swelling, Joint range of motion-normal  SKIN-no rash, no lesion    LABS:                                        10.8   5.12  )-----------( 528      ( 03 Dec 2020 16:40 )             34.1       12-03    138  |  105  |  28<H>  ----------------------------<  101<H>  4.2   |  28  |  0.74    Ca    9.1      03 Dec 2020 16:40        PT/INR - ( 03 Dec 2020 16:40 )   PT: 13.6 sec;   INR: 1.18 ratio         PTT - ( 03 Dec 2020 16:40 )  PTT:36.7 sec      RADIOLOGY & ADDITIONAL TESTS:      EXAM:  CT PELVIS BONY ONLY                          [facility]  [facility]                          EXAM:  CT FEMUR ONLY RT                          EXAM:  [facility]                            PROCEDURE DATE:  12/03/2020          INTERPRETATION:  CT OF THE PELVIS AND RIGHT FEMUR    CLINICAL INFORMATION: Pelvic and right thigh pain. Inability to ambulate. Question fracture.  TECHNIQUE: Multidetector CT of the pelvis and right femur. The study was performed without the use of intravenous or intra-articular contrast. Multiplanar reformats were generated for review. Three dimensional reconstructions were obtained on an independent workstation. The interpretation of these images is included in the body of the report of the main portion of the study.    COMPARISON: Pelvis and right hip radiographs 3 December 2020 and 16 June 2020.    FINDINGS:    HARDWARE: Patient status post intramedullary ac and cephalomedullary screw fixation of the right femur. A distal transfixion screw is identified. 3 cerclage wires are seen surrounding the proximal subtrochanteric femur. The hardware is intact without periprosthetic lucency to suggest loosening.    BONE: Nondisplaced subacute fracture of the right sacral ala (2:45-70, 602:56). Subacute fracture of the superior ramus, inferior ramus, and body of the left pubis mild surrounding callus formation. Healing spiral fracture of the right proximal femur. No acute fractures are identified. Scattered bone islands are identified.  No dislocation. Multilevel spondylosis of the imaged lumbar spine. Degenerative change of the bilateral sacroiliac joints. Degeneration of the pubic symphysis. Bilateral hip cartilage space narrowing and marginal osteophyte formation.    SOFT TISSUE: Vascular calcifications. Vessels are otherwise normal in course and caliber. Normal appearance of the appendix. Diverticular disease of the colon without evidence of acute diverticulitis. No pelvic free fluid or lymphadenopathy.  Mild swelling and edema of the right piriformis muscle. The muscles are otherwise symmetric in appearance. Scar tissue is identified in the lateral aspect of the right proximal thigh consistent with prior surgical intervention.      IMPRESSION:  1.  No acute fracture. Healing subacute fractures of the right sacral ala, left pubis, and right femur, as described above.  2.  Patient status post ORIF of the right femur. No evidence of hardware complication.  3.  Focal swelling and edema about the right piriformis muscle, suggesting possible piriformis syndrome.      MEDICATIONS  (STANDING):  aspirin enteric coated 81 milliGRAM(s) Oral daily  atorvastatin 80 milliGRAM(s) Oral at bedtime  cyanocobalamin 1000 MICROGram(s) Oral daily  fenofibrate Tablet 145 milliGRAM(s) Oral daily  levothyroxine 50 MICROGram(s) Oral daily  losartan 50 milliGRAM(s) Oral daily  sertraline 100 milliGRAM(s) Oral daily    MEDICATIONS  (PRN):  acetaminophen   Tablet .. 650 milliGRAM(s) Oral every 6 hours PRN Mild Pain (1 - 3)      84 yo Female with above pmh a/w:      1.  Right Hip pain  Difficulty to ambulate  -will keep on fall precautions  -follow PT eval  -follow Orthopedics consult: no interventions:  f/u with Dr Salinas as out pt    2.  HTN  HLD  Hypothyroidism  -continue home medications  -follow clinically    3.  VTE prophylaxis   start lovenox  venodynes     4.  Code status: Patient is in full code status.      dispo : rehab likely

## 2020-12-04 NOTE — H&P ADULT - HISTORY OF PRESENT ILLNESS
CC difficulty ambulating due to right hip pain, x2 weeks, worsening. Pt went for chemo treatment today, and was sent to ED because pt was having difficulty ambulating, even with a walker. Pt normally self-ambulates. Pt was not fallen since x2 weeks ago 86 y/o Female with a PMHx of acute promyelocytic leukemia, anemia, cataracts, HTN, HLD, hypothyroid, macular degeneration, h/o right femur fracture repair 2020 presents to the ED with complain of difficulty ambulating secondary to right hip pain. Patient can not ambulate, can not even stand from wheelchair. Patient was having difficulty ambulating, even with a walker. Patient normally self-ambulates. No fall in last 2-3 weeks. No injury or trauma. No LOC. No fever. No other complain today. No other joint pain.     Family hx:  Father: No Cancer,. No CAD, No CVA, .  Mother: , No Cancer, No CAD, No DM.

## 2020-12-04 NOTE — H&P ADULT - NSHPPHYSICALEXAM_GEN_ALL_CORE
Vital Signs Last 24 Hrs  T(C): 37.1 (04 Dec 2020 00:36), Max: 37.8 (03 Dec 2020 15:34)  T(F): 98.7 (04 Dec 2020 00:36), Max: 100 (03 Dec 2020 15:34)  HR: 78 (04 Dec 2020 00:36) (78 - 85)  BP: 101/38 (04 Dec 2020 00:36) (101/38 - 141/54)  BP(mean): 81 (03 Dec 2020 19:57) (68 - 81)  RR: 16 (04 Dec 2020 00:36) (16 - 20)  SpO2: 96% (04 Dec 2020 00:36) (95% - 99%)

## 2020-12-04 NOTE — PHYSICAL THERAPY INITIAL EVALUATION ADULT - PRECAUTIONS/LIMITATIONS, REHAB EVAL
fall precautions fall precautions/macular degeneration R eye ; pt on lifelong chemoRx monthly and reports NO SIDE EFFECTS

## 2020-12-04 NOTE — H&P ADULT - ASSESSMENT
86 yo Female presented with Right Hip pain.    A/P:    1.  Right Hip pain  Inability to ambulate  Difficulty to ambulate  -will keep on fall precautions  -follow PT eval  -follow Orthopedics consult    2.  HTN  HLD  Hypothyroidism  -continue home medications  -follow clinically    3.  SCD for DVT ppx    4.  Code status: Patient is in full code status.

## 2020-12-04 NOTE — PHYSICAL THERAPY INITIAL EVALUATION ADULT - PATIENT PROFILE REVIEW, REHAB EVAL
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

## 2020-12-04 NOTE — PHYSICAL THERAPY INITIAL EVALUATION ADULT - PATIENT/FAMILY/SIGNIFICANT OTHER GOALS STATEMENT, PT EVAL
pt hopes that she will be able to return home upon discharge but is open to [facility] if needed ,attended [facility] after ORIF/IMN in June 2020 and very pleased with care there

## 2020-12-04 NOTE — PROGRESS NOTE ADULT - ATTENDING COMMENTS
Patient is seen and examined at bedside with NP Beverley Harley. Pain is controlled. Awaiting for PT. Eventually wants to go home if ambulatory enough. Agree with above assessment and plan. D/w pt

## 2020-12-04 NOTE — PHYSICAL THERAPY INITIAL EVALUATION ADULT - GENERAL OBSERVATIONS, REHAB EVAL
pt supine recumbent in bed ,eagerly awaiting PT and growing impatient; apologized for seeing in PM as bulk of my patient load on other units ,A&Ox4,wearing wig due to alopecia

## 2020-12-04 NOTE — H&P ADULT - NSICDXPASTMEDICALHX_GEN_ALL_CORE_FT
PAST MEDICAL HISTORY:  Acute promyelocytic leukemia     Anemia     Cataract left eye    HLD (hyperlipidemia)     HTN (hypertension)     Hypothyroidism     Macular degeneration right

## 2020-12-04 NOTE — PHYSICAL THERAPY INITIAL EVALUATION ADULT - DIAGNOSIS, PT EVAL
gait impairment/inability, subacute fxs L inf/sup.pubic rami,L pubic body ,R sacral ala & healing spiral fx R proximal femur s/p IMN with prox.& distal fixation screws +cerclage wires at subtroch.level ,?R piriformis syndrome/edema

## 2020-12-04 NOTE — PHYSICAL THERAPY INITIAL EVALUATION ADULT - ACTIVE RANGE OF MOTION EXAMINATION, REHAB EVAL
bilateral lower extremity Active ROM was WNL (within normal limits)/myrtle. upper extremity Active ROM was WNL (within normal limits)/good AROM BLEs without limitation

## 2020-12-04 NOTE — PHYSICAL THERAPY INITIAL EVALUATION ADULT - MARITAL STATUS
/son Ulices & dtr in law reside [place] /son Ulices & dtr in law reside [place],and had offered to stay with pt if needed the last time she was hospitalized

## 2020-12-04 NOTE — PHYSICAL THERAPY INITIAL EVALUATION ADULT - PERTINENT HX OF CURRENT PROBLEM, REHAB EVAL
inability to stand from WC or walk even with walker due to pain ,no recent trauma/fall in past 3 weeks inability to stand from WC or walk even with walker due to pain ,no recent trauma/fall in past 3 weeks; had a fwd fall 3 weeks ago when toe caught and she propelled fwd ,diagnosed with L pelvic fxs at that time

## 2020-12-04 NOTE — PHYSICAL THERAPY INITIAL EVALUATION ADULT - DISCHARGE DISPOSITION, PT EVAL
home w/ home PT/rehabilitation facility/TBD based on progress with PT,consistent performance with decreasing assistance,pain remains well controlled,family preferences

## 2020-12-04 NOTE — H&P ADULT - NEUROLOGICAL DETAILS
alert and oriented x 3/normal strength/responds to pain/responds to verbal commands/cranial nerves intact/sensation intact/deep reflexes intact

## 2020-12-04 NOTE — PHYSICAL THERAPY INITIAL EVALUATION ADULT - PLANNED THERAPY INTERVENTIONS, PT EVAL
Stair climbing with cane/HR ; icing to R mid-buttock and ischial tuberosity/gait training/transfer training/ROM/bed mobility training

## 2020-12-04 NOTE — H&P ADULT - NSICDXPASTSURGICALHX_GEN_ALL_CORE_FT
PAST SURGICAL HISTORY:  Fracture of femur right ac right    H/O lumpectomy Left side in 2007, performed by Dr. Samuels at

## 2020-12-04 NOTE — PHYSICAL THERAPY INITIAL EVALUATION ADULT - MANUAL MUSCLE TESTING RESULTS, REHAB EVAL
WFL BUE/BLEs; isometric hamstring contraction RLE reproduces pain R buttock (?hamstring origin strain)

## 2020-12-04 NOTE — PHYSICAL THERAPY INITIAL EVALUATION ADULT - LIVES WITH, PROFILE
resides in home with 9 step to enter with HR and no steps inside; has supportive friends/neighbors/alone

## 2020-12-04 NOTE — PHYSICAL THERAPY INITIAL EVALUATION ADULT - LEVEL OF INDEPENDENCE: SIT/SUPINE, REHAB EVAL
out of bed to chair with waffle cushion to seat,ice pack to mid-buttock,TT in place,eating dinner meal

## 2020-12-05 LAB
ANION GAP SERPL CALC-SCNC: 4 MMOL/L — LOW (ref 5–17)
BUN SERPL-MCNC: 28 MG/DL — HIGH (ref 7–23)
CALCIUM SERPL-MCNC: 8.7 MG/DL — SIGNIFICANT CHANGE UP (ref 8.5–10.1)
CHLORIDE SERPL-SCNC: 109 MMOL/L — HIGH (ref 96–108)
CO2 SERPL-SCNC: 27 MMOL/L — SIGNIFICANT CHANGE UP (ref 22–31)
CREAT SERPL-MCNC: 0.55 MG/DL — SIGNIFICANT CHANGE UP (ref 0.5–1.3)
CULTURE RESULTS: SIGNIFICANT CHANGE UP
GLUCOSE SERPL-MCNC: 129 MG/DL — HIGH (ref 70–99)
HCT VFR BLD CALC: 35.9 % — SIGNIFICANT CHANGE UP (ref 34.5–45)
HGB BLD-MCNC: 11.3 G/DL — LOW (ref 11.5–15.5)
MCHC RBC-ENTMCNC: 30.2 PG — SIGNIFICANT CHANGE UP (ref 27–34)
MCHC RBC-ENTMCNC: 31.5 GM/DL — LOW (ref 32–36)
MCV RBC AUTO: 96 FL — SIGNIFICANT CHANGE UP (ref 80–100)
PLATELET # BLD AUTO: 393 K/UL — SIGNIFICANT CHANGE UP (ref 150–400)
POTASSIUM SERPL-MCNC: 4.1 MMOL/L — SIGNIFICANT CHANGE UP (ref 3.5–5.3)
POTASSIUM SERPL-SCNC: 4.1 MMOL/L — SIGNIFICANT CHANGE UP (ref 3.5–5.3)
RBC # BLD: 3.74 M/UL — LOW (ref 3.8–5.2)
RBC # FLD: 14.2 % — SIGNIFICANT CHANGE UP (ref 10.3–14.5)
SARS-COV-2 RNA SPEC QL NAA+PROBE: SIGNIFICANT CHANGE UP
SODIUM SERPL-SCNC: 140 MMOL/L — SIGNIFICANT CHANGE UP (ref 135–145)
SPECIMEN SOURCE: SIGNIFICANT CHANGE UP
WBC # BLD: 3.6 K/UL — LOW (ref 3.8–10.5)
WBC # FLD AUTO: 3.6 K/UL — LOW (ref 3.8–10.5)

## 2020-12-05 PROCEDURE — 99232 SBSQ HOSP IP/OBS MODERATE 35: CPT

## 2020-12-05 RX ADMIN — ATORVASTATIN CALCIUM 80 MILLIGRAM(S): 80 TABLET, FILM COATED ORAL at 21:22

## 2020-12-05 RX ADMIN — Medication 650 MILLIGRAM(S): at 20:34

## 2020-12-05 RX ADMIN — Medication 50 MICROGRAM(S): at 05:18

## 2020-12-05 RX ADMIN — Medication 650 MILLIGRAM(S): at 05:20

## 2020-12-05 RX ADMIN — Medication 145 MILLIGRAM(S): at 10:45

## 2020-12-05 RX ADMIN — ENOXAPARIN SODIUM 40 MILLIGRAM(S): 100 INJECTION SUBCUTANEOUS at 10:45

## 2020-12-05 RX ADMIN — Medication 650 MILLIGRAM(S): at 05:50

## 2020-12-05 RX ADMIN — PREGABALIN 1000 MICROGRAM(S): 225 CAPSULE ORAL at 10:45

## 2020-12-05 RX ADMIN — Medication 81 MILLIGRAM(S): at 10:45

## 2020-12-05 RX ADMIN — Medication 650 MILLIGRAM(S): at 20:04

## 2020-12-05 RX ADMIN — LOSARTAN POTASSIUM 50 MILLIGRAM(S): 100 TABLET, FILM COATED ORAL at 10:45

## 2020-12-05 RX ADMIN — SERTRALINE 100 MILLIGRAM(S): 25 TABLET, FILM COATED ORAL at 10:45

## 2020-12-05 NOTE — PROVIDER CONTACT NOTE (OTHER) - SITUATION
called dr DR Kirk service for a routine consult.
Notified Doctor's office in regards to patient's admission. Spoke to Mitchel

## 2020-12-05 NOTE — PROGRESS NOTE ADULT - SUBJECTIVE AND OBJECTIVE BOX
Ilda Silva    Patient is a 85y old  Female who presents with a chief complaint of Right Hip pain (04 Dec 2020 01:45)      HPI:  86 y/o Female with a PMHx of acute promyelocytic leukemia, anemia, cataracts, HTN, HLD, hypothyroid, macular degeneration, h/o right femur fracture repair 2020 presents to the ED with complain of difficulty ambulating secondary to right hip pain. Patient can not ambulate, can not even stand from wheelchair. Patient was having difficulty ambulating, even with a walker. Patient normally self-ambulates. No fall in last 2-3 weeks. No injury or trauma. No LOC. No fever. No other complain today. No other joint pain.     :  seen at bedside, without c/o, awaiting PT   no acute events    Review of system- Rest of the review of system are normal except mentioned in HPI    Vital Signs Last 24 Hrs  T(C): 36.6 (05 Dec 2020 10:44), Max: 37.9 (04 Dec 2020 20:57)  T(F): 97.9 (05 Dec 2020 10:44), Max: 100.2 (04 Dec 2020 20:57)  HR: 79 (05 Dec 2020 10:44) (79 - 95)  BP: 148/60 (05 Dec 2020 10:44) (137/52 - 148/60)  BP(mean): --  RR: 19 (05 Dec 2020 10:44) (16 - 19)  SpO2: 97% (05 Dec 2020 10:44) (95% - 97%)    PHYSICAL EXAM:  GENERAL: NAD, well-groomed, well-developed  HEAD:  Atraumatic, Normocephalic  EYES: EOMI, PERRLA, conjunctiva and sclera clear  ENMT: Moist mucous membranes  NECK: Supple, No JVD  NERVOUS SYSTEM:  Alert & Oriented X3, Motor Strength 5/5 B/L upper and lower extremities; DTRs 2+ intact and symmetric  CHEST/LUNG: Clear to auscultation bilaterally; No rales, rhonchi, wheezing, or rubs  HEART: Regular rate and rhythm; No murmurs, rubs, or gallops  ABDOMEN: Soft, Nontender, Nondistended; Bowel sounds present  GENITOURINARY- Voiding, no palpable bladder  EXTREMITIES:  2+ Peripheral Pulses, No clubbing, cyanosis, or edema  MUSCULOSKELETAL- No muscle tenderness, Muscle tone normal, No joint tenderness, no Joint swelling, Joint range of motion-normal  SKIN-no rash, no lesion    LABS:                        11.3   3.60  )-----------( 393      ( 05 Dec 2020 06:55 )             35.9     05 Dec 2020 06:55    140    |  109    |  28     ----------------------------<  129    4.1     |  27     |  0.55     Ca    8.7        05 Dec 2020 06:55    PT/INR - ( 03 Dec 2020 16:40 )   PT: 13.6 sec;   INR: 1.18 ratio      PTT - ( 03 Dec 2020 16:40 )  PTT:36.7 sec    Urinalysis Basic - ( 03 Dec 2020 21:14 )  Color: Yellow / Appearance: Clear / S.015 / pH: x  Gluc: x / Ketone: Negative  / Bili: Negative / Urobili: Negative mg/dL   Blood: x / Protein: 15 mg/dL / Nitrite: Negative   Leuk Esterase: Negative / RBC: Negative /HPF / WBC 0-2   Sq Epi: x / Non Sq Epi: Negative / Bacteria: Few    RADIOLOGY & ADDITIONAL TESTS:  EXAM:  CT PELVIS BONY ONLY                        EXAM:  CT 3D RECONSTRUCT [facility]                        EXAM:  CT FEMUR ONLY RT                        [facility]  [facility]                          PROCEDURE DATE:  2020          INTERPRETATION:  CT OF THE PELVIS AND RIGHT FEMUR    CLINICAL INFORMATION: Pelvic and right thigh pain. Inability to ambulate. Question fracture.  TECHNIQUE: Multidetector CT of the pelvis and right femur. The study was performed without the use of intravenous or intra-articular contrast. Multiplanar reformats were generated for review. Three dimensional reconstructions were obtained on an independent workstation. The interpretation of these images is included in the body of the report of the main portion of the study.    COMPARISON: Pelvis and right hip radiographs 3 December 2020 and 2020.    FINDINGS:    HARDWARE: Patient status post intramedullary ac and cephalomedullary screw fixation of the right femur. A distal transfixion screw is identified. 3 cerclage wires are seen surrounding the proximal subtrochanteric femur. The hardware is intact without periprosthetic lucency to suggest loosening.    BONE: Nondisplaced subacute fracture of the right sacral ala (2:45-70, 602:56). Subacute fracture of the superior ramus, inferior ramus, and body of the left pubis mild surrounding callus formation. Healing spiral fracture of the right proximal femur. No acute fractures are identified. Scattered bone islands are identified.  No dislocation. Multilevel spondylosis of the imaged lumbar spine. Degenerative change of the bilateral sacroiliac joints. Degeneration of the pubic symphysis. Bilateral hip cartilage space narrowing and marginal osteophyte formation.    SOFT TISSUE: Vascular calcifications. Vessels are otherwise normal in course and caliber. Normal appearance of the appendix. Diverticular disease of the colon without evidence of acute diverticulitis. No pelvic free fluid or lymphadenopathy.  Mild swelling and edema of the right piriformis muscle. The muscles are otherwise symmetric in appearance. Scar tissue is identified in the lateral aspect of the right proximal thigh consistent with prior surgical intervention.      IMPRESSION:  1.  No acute fracture. Healing subacute fractures of the right sacral ala, left pubis, and right femur, as described above.  2.  Patient status post ORIF of the right femur. No evidence of hardware complication.  3.  Focal swelling and edema about the right piriformis muscle, suggesting possible piriformis syndrome.      MEDICATIONS  (STANDING):  aspirin enteric coated 81 milliGRAM(s) Oral daily  atorvastatin 80 milliGRAM(s) Oral at bedtime  cyanocobalamin 1000 MICROGram(s) Oral daily  fenofibrate Tablet 145 milliGRAM(s) Oral daily  levothyroxine 50 MICROGram(s) Oral daily  losartan 50 milliGRAM(s) Oral daily  sertraline 100 milliGRAM(s) Oral daily    MEDICATIONS  (PRN):  acetaminophen   Tablet .. 650 milliGRAM(s) Oral every 6 hours PRN Mild Pain (1 - 3)    Assessment/Plan:  #Right Hip pain  #Difficulty to ambulate  Ortho f/u appreciated  No acute fractures  PT eval- will need [facility]  Pain meds prn  Incentive spirometry  Bowel regimen    #HTN  #HLD  #Hypothyroidism  -continue home medications  -follow clinically    #DVT proph- Lovenox    #COVID- needs swab today and tomorrow for [place] [facility] placement    #Dispo- [facility] Monday. D/w pt

## 2020-12-06 LAB — SARS-COV-2 RNA SPEC QL NAA+PROBE: SIGNIFICANT CHANGE UP

## 2020-12-06 PROCEDURE — 99232 SBSQ HOSP IP/OBS MODERATE 35: CPT

## 2020-12-06 RX ORDER — OMEGA-3 ACID ETHYL ESTERS 1 G
1 CAPSULE ORAL
Qty: 0 | Refills: 0 | DISCHARGE

## 2020-12-06 RX ORDER — ACETAMINOPHEN 500 MG
2 TABLET ORAL
Qty: 0 | Refills: 0 | DISCHARGE
Start: 2020-12-06

## 2020-12-06 RX ORDER — ENOXAPARIN SODIUM 100 MG/ML
40 INJECTION SUBCUTANEOUS
Qty: 0 | Refills: 0 | DISCHARGE
Start: 2020-12-06

## 2020-12-06 RX ORDER — MULTIVIT-MIN/FERROUS GLUCONATE 9 MG/15 ML
1 LIQUID (ML) ORAL
Qty: 0 | Refills: 0 | DISCHARGE

## 2020-12-06 RX ADMIN — ENOXAPARIN SODIUM 40 MILLIGRAM(S): 100 INJECTION SUBCUTANEOUS at 11:10

## 2020-12-06 RX ADMIN — Medication 81 MILLIGRAM(S): at 11:07

## 2020-12-06 RX ADMIN — LOSARTAN POTASSIUM 50 MILLIGRAM(S): 100 TABLET, FILM COATED ORAL at 11:10

## 2020-12-06 RX ADMIN — SERTRALINE 100 MILLIGRAM(S): 25 TABLET, FILM COATED ORAL at 11:07

## 2020-12-06 RX ADMIN — Medication 650 MILLIGRAM(S): at 20:10

## 2020-12-06 RX ADMIN — Medication 650 MILLIGRAM(S): at 21:10

## 2020-12-06 RX ADMIN — Medication 145 MILLIGRAM(S): at 11:07

## 2020-12-06 RX ADMIN — PREGABALIN 1000 MICROGRAM(S): 225 CAPSULE ORAL at 11:07

## 2020-12-06 RX ADMIN — ATORVASTATIN CALCIUM 80 MILLIGRAM(S): 80 TABLET, FILM COATED ORAL at 20:11

## 2020-12-06 RX ADMIN — Medication 50 MICROGRAM(S): at 05:56

## 2020-12-06 NOTE — DISCHARGE NOTE PROVIDER - CARE PROVIDER_API CALL
Pancho Salinas)  Orthopaedic Surgery  [address], [place] [address]  Phone: [phone]  Fax: [phone]  Follow Up Time: 2 weeks    Romaine Kirk)  Medical Oncology  [address] [place] [address], [address] [place] [address]  Phone: [phone]  Fax: [phone]  Follow Up Time: Routine

## 2020-12-06 NOTE — DISCHARGE NOTE PROVIDER - NSDCMRMEDTOKEN_GEN_ALL_CORE_FT
acetaminophen 325 mg oral tablet: 2 tab(s) orally every 6 hours, As needed, Mild Pain (1 - 3)  aspirin 81 mg oral tablet: 1 tab(s) orally once a day  cyanocobalamin 1000 mcg oral tablet: 1 tab(s) orally once a day  enoxaparin: 40 milligram(s) subcutaneous once a day until ambulatory  fenofibrate 160 mg oral tablet: 1 tab(s) orally once a day  levothyroxine 50 mcg (0.05 mg) oral tablet: 1 tab(s) orally once a day  losartan 50 mg oral tablet: 1 tab(s) orally once a day (in the evening)  rosuvastatin 20 mg oral tablet: 1 tab(s) orally once a day  sertraline 100 mg oral tablet: 1 tab(s) orally once a day

## 2020-12-06 NOTE — DISCHARGE NOTE PROVIDER - HOSPITAL COURSE
Patient presented with hip pain and difficulty ambulating. Has subacute LT pubic rami fracture that was followed up as outpatient. Seen and evaluated by PT and recommended [facility].

## 2020-12-06 NOTE — CONSULT NOTE ADULT - SUBJECTIVE AND OBJECTIVE BOX
HPI:  84 y/o Female with a PMHx of acute promyelocytic leukemia, anemia, cataracts, HTN, HLD, hypothyroid, macular degeneration, h/o right femur fracture repair 2020 presents to the ED with complain of difficulty ambulating secondary to right hip pain. Patient can not ambulate, can not even stand from wheelchair. Patient was having difficulty ambulating, even with a walker. Patient normally self-ambulates. No fall in last 2-3 weeks. No injury or trauma. No LOC. No fever. No other complain today. No other joint pain.     Family hx:  Father: No Cancer,. No CAD, No CVA, .  Mother: , No Cancer, No CAD, No DM.  (04 Dec 2020 01:45)      PAST MEDICAL & SURGICAL HISTORY:  Cataract  left eye    Macular degeneration  right    Hypothyroidism    HLD (hyperlipidemia)    HTN (hypertension)    Acute promyelocytic leukemia    Anemia    Fracture of femur  right ac right    H/O lumpectomy  Left side in         MEDICATIONS  (STANDING):  aspirin enteric coated 81 milliGRAM(s) Oral daily  atorvastatin 80 milliGRAM(s) Oral at bedtime  cyanocobalamin 1000 MICROGram(s) Oral daily  enoxaparin Injectable 40 milliGRAM(s) SubCutaneous daily  fenofibrate Tablet 145 milliGRAM(s) Oral daily  levothyroxine 50 MICROGram(s) Oral daily  losartan 50 milliGRAM(s) Oral daily  sertraline 100 milliGRAM(s) Oral daily    MEDICATIONS  (PRN):  acetaminophen   Tablet .. 650 milliGRAM(s) Oral every 6 hours PRN Mild Pain (1 - 3)      Allergies    No Known Allergies    Intolerances        SOCIAL HISTORY:    FAMILY HISTORY:  Family history of cancer in brother  Colon cancer    Patient&#x27;s mother is   Hx of breast and bone cancer    Patient&#x27;s father is   Hx of spine cancer        Vital Signs Last 24 Hrs  T(C): 36.8 (06 Dec 2020 00:22), Max: 36.8 (05 Dec 2020 20:18)  T(F): 98.2 (06 Dec 2020 00:22), Max: 98.2 (05 Dec 2020 20:18)  HR: 75 (06 Dec 2020 00:22) (75 - 88)  BP: 135/61 (06 Dec 2020 00:22) (117/57 - 148/60)  BP(mean): --  RR: 16 (06 Dec 2020 00:22) (16 - 19)  SpO2: 97% (06 Dec 2020 00:22) (95% - 97%)      LABS:                        11.3   3.60  )-----------( 393      ( 05 Dec 2020 06:55 )             35.9     12-    140  |  109<H>  |  28<H>  ----------------------------<  129<H>  4.1   |  27  |  0.55    Ca    8.7      05 Dec 2020 06:55            RADIOLOGY & ADDITIONAL STUDIES:    < from: CT Femur No Cont, Right (20 @ 19:17) >  EXAM:  CT PELVIS BONY ONLY                          EXAM:  CT 3D RECONSTRUCT WO ROCKY                          EXAM:  CT FEMUR ONLY RT                          EXAM:  CT 3D RECONSTRUCT WO ROCKY                            PROCEDURE DATE:  2020          INTERPRETATION:  CT OF THE PELVIS AND RIGHT FEMUR    CLINICAL INFORMATION: Pelvic and right thigh pain. Inability to ambulate. Question fracture.  TECHNIQUE: Multidetector CT of the pelvis and right femur. The study was performed without the use of intravenous or intra-articular contrast. Multiplanar reformats were generated for review. Three dimensional reconstructions were obtained on an independent workstation. The interpretation of these images is included in the body of the report of the main portion of the study.    COMPARISON: Pelvis and right hip radiographs 3 December 2020 and 2020.    FINDINGS:    HARDWARE: Patient status post intramedullary ac and cephalomedullary screw fixation of the right femur. A distal transfixion screw is identified. 3 cerclage wires are seen surrounding the proximal subtrochanteric femur. The hardware is intact without periprosthetic lucency to suggest loosening.    BONE: Nondisplaced subacute fracture of the right sacral ala (2:45-70, 602:56). Subacute fracture of the superior ramus, inferior ramus, and body of the left pubis mild surrounding callus formation. Healing spiral fracture of the right proximal femur. No acute fractures are identified. Scattered bone islands are identified.  No dislocation. Multilevel spondylosis of the imaged lumbar spine. Degenerative change of the bilateral sacroiliac joints. Degeneration of the pubic symphysis. Bilateral hip cartilage space narrowing and marginal osteophyte formation.    SOFT TISSUE: Vascular calcifications. Vessels are otherwise normal in course and caliber. Normal appearance of the appendix. Diverticular disease of the colon without evidence of acute diverticulitis. No pelvic free fluid or lymphadenopathy.  Mild swelling and edema of the right piriformis muscle. The muscles are otherwise symmetric in appearance. Scar tissue is identified in the lateral aspect of the right proximal thigh consistent with prior surgical intervention.      IMPRESSION:  1.  No acute fracture. Healing subacute fractures of the right sacral ala, left pubis, and right femur, as described above.  2.  Patient status post ORIF of the right femur. No evidence of hardware complication.  3.  Focal swelling and edema about the right piriformis muscle, suggesting possible piriformis syndrome.        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 Patient can not ambulate, can not even stand from wheelchair. Patient was having difficulty ambulating, even with a walker. Patient normally self-ambulates. No fall in last 2-3 weeks. No injury or trauma. No LOC. No fever. No other complain today. No other joint pain.     Family hx:  Father: No Cancer,. No CAD, No CVA, .  Mother: , No Cancer, No CAD, No DM.  (04 Dec 2020 01:45)      PAST MEDICAL & SURGICAL HISTORY:  MDS/ see above  Hypothyroidism  HLD (hyperlipidemia)  HTN (hypertension)  Anemia  Fracture of femur  right ac right  H/O lumpectomy  Left side in         MEDICATIONS  (STANDING):  aspirin enteric coated 81 milliGRAM(s) Oral daily  atorvastatin 80 milliGRAM(s) Oral at bedtime  cyanocobalamin 1000 MICROGram(s) Oral daily  enoxaparin Injectable 40 milliGRAM(s) SubCutaneous daily  fenofibrate Tablet 145 milliGRAM(s) Oral daily  levothyroxine 50 MICROGram(s) Oral daily  losartan 50 milliGRAM(s) Oral daily  sertraline 100 milliGRAM(s) Oral daily    MEDICATIONS  (PRN):  acetaminophen   Tablet .. 650 milliGRAM(s) Oral every 6 hours PRN Mild Pain (1 - 3)      Allergies    No Known Allergies      FAMILY HISTORY:  Family history of cancer in brother  Colon cancer    Patient&#x27;s mother is   Hx of breast and bone cancer    Patient&#x27;s father is   Hx of spine cancer        Vital Signs Last 24 Hrs  T(C): 36.8 (06 Dec 2020 00:22), Max: 36.8 (05 Dec 2020 20:18)  T(F): 98.2 (06 Dec 2020 00:22), Max: 98.2 (05 Dec 2020 20:18)  HR: 75 (06 Dec 2020 00:22) (75 - 88)  BP: 135/61 (06 Dec 2020 00:22) (117/57 - 148/60)  BP(mean): --  RR: 16 (06 Dec 2020 00:22) (16 - 19)  SpO2: 97% (06 Dec 2020 00:22) (95% - 97%)    PHYSICAL EXAM:      Constitutional: Appears comfortable, in  NAD, A/O X 3     Eyes: EOMI, PERRLA ;No icterus    ENMT:  No oral lesions, thrush; pharynx not injected    Neck:  Supple; No masses, lymph nodes, no bruits    Breasts: NA    Back: No tenderness     Respiratory:  Clear to A/P, No wheezes, rhonchi, rales. No dullness to percussion    Cardiovascular: Normal rate and rhythm; normal S1 and S2; No murmurs. No gallop    Gastrointestinal: No distension, normal bowl sounds; No tendeness , guarding, rebound;  no masses, no hepatosplenimegaly, no fluid wave    Genitourinary: No flank pains, no inguninal adenopathy    Rectal: NA    Extremities:  No phlebitis, no edema    Vascular: No acrocyanosis, no edema    Neurological: Alert and oriented. Cranial nerves II-XII WNL, Upper extremity / lower extremity  strength WNL;  Reflexes WNL, Plantar downgoing ; No cerebellar signs    Skin: No rash, petichae, purpura, echymoses    Lymph Nodes: No cervical, supraclavicular, axillary, inguinal adenopathy    Musculoskeletal: No joint swelling    Psychiatric: Alert, oriented, normal affect      LABS:                        11.3   3.60  )-----------( 393      ( 05 Dec 2020 06:55 )             35.9     12-05    140  |  109<H>  |  28<H>  ----------------------------<  129<H>  4.1   |  27  |  0.55    Ca    8.7      05 Dec 2020 06:55    RADIOLOGY & ADDITIONAL STUDIES:    CT Femur No Cont, Right (20 @ 19:17) >   PROCEDURE DATE:  2020      INTERPRETATION:  CT OF THE PELVIS AND RIGHT FEMUR    CLINICAL INFORMATION: Pelvic and right thigh pain. Inability to ambulate. Question fracture.  TECHNIQUE: Multidetector CT of the pelvis and right femur. The study was performed without the use of intravenous or intra-articular contrast. Multiplanar reformats were generated for review. Three dimensional reconstructions were obtained on an independent workstation. The interpretation of these images is included in the body of the report of the main portion of the study.    COMPARISON: Pelvis and right hip radiographs 3 December 2020 and 2020.    FINDINGS:    HARDWARE: Patient status post intramedullary ac and cephalomedullary screw fixation of the right femur. A distal transfixion screw is identified. 3 cerclage wires are seen surrounding the proximal subtrochanteric femur. The hardware is intact without periprosthetic lucency to suggest loosening.    BONE: Nondisplaced subacute fracture of the right sacral ala (2:45-70, 602:56). Subacute fracture of the superior ramus, inferior ramus, and body of the left pubis mild surrounding callus formation. Healing spiral fracture of the right proximal femur. No acute fractures are identified. Scattered bone islands are identified.  No dislocation. Multilevel spondylosis of the imaged lumbar spine. Degenerative change of the bilateral sacroiliac joints. Degeneration of the pubic symphysis. Bilateral hip cartilage space narrowing and marginal osteophyte formation.    SOFT TISSUE: Vascular calcifications. Vessels are otherwise normal in course and caliber. Normal appearance of the appendix. Diverticular disease of the colon without evidence of acute diverticulitis. No pelvic free fluid or lymphadenopathy.  Mild swelling and edema of the right piriformis muscle. The muscles are otherwise symmetric in appearance. Scar tissue is identified in the lateral aspect of the right proximal thigh consistent with prior surgical intervention.      IMPRESSION:  1.  No acute fracture. Healing subacute fractures of the right sacral ala, left pubis, and right femur, as described above.  2.  Patient status post ORIF of the right femur. No evidence of hardware complication.  3.  Focal swelling and edema about the right piriformis muscle, suggesting possible piriformis syndrome.        < end of copied text >      ad

## 2020-12-06 NOTE — DISCHARGE NOTE PROVIDER - NSDCCPCAREPLAN_GEN_ALL_CORE_FT
PRINCIPAL DISCHARGE DIAGNOSIS  Diagnosis: Inability to ambulate due to right hip  Assessment and Plan of Treatment: WBAT      SECONDARY DISCHARGE DIAGNOSES  Diagnosis: Piriformis syndrome of right side  Assessment and Plan of Treatment:

## 2020-12-06 NOTE — CONSULT NOTE ADULT - ASSESSMENT
Myelodysplasia / RAEB-t2  Very good and sustained response to Vidaza/  had cycle 13 last week  Modest anemia  Mild leukopenia  Fall/ hip trauma/ prior fractures/ pain syndrome      A/P    She is not due for another cycle of therapy for a month  No restrictions at this time for Rehab/ PT etc  No heme contraindications to  NSAIDs if needed  DVT prophylaxis  She can FU in the office after rehab/ PT etc

## 2020-12-06 NOTE — DISCHARGE NOTE PROVIDER - PROVIDER TOKENS
PROVIDER:[TOKEN:[[id number]],FOLLOWUP:[2 weeks]],PROVIDER:[TOKEN:[[id number]],FOLLOWUP:[Routine]]

## 2020-12-06 NOTE — PROGRESS NOTE ADULT - SUBJECTIVE AND OBJECTIVE BOX
Ilda Silva    Patient is a 85y old  Female who presents with a chief complaint of Right Hip pain (04 Dec 2020 01:45)      HPI:  86 y/o Female with a PMHx of acute promyelocytic leukemia, anemia, cataracts, HTN, HLD, hypothyroid, macular degeneration, h/o right femur fracture repair 06/2020 presents to the ED with complain of difficulty ambulating secondary to right hip pain. Patient can not ambulate, can not even stand from wheelchair. Patient was having difficulty ambulating, even with a walker. Patient normally self-ambulates. No fall in last 2-3 weeks. No injury or trauma. No LOC. No fever. No other complain today. No other joint pain.     12/4:  seen at bedside, without c/o, awaiting PT  12/5 no acute events  12/6  up in a chair, doing good    Review of system- Rest of the review of system are normal except mentioned in HPI    Vital Signs Last 24 Hrs  T(C): 36.9 (06 Dec 2020 09:40), Max: 36.9 (06 Dec 2020 09:40)  T(F): 98.4 (06 Dec 2020 09:40), Max: 98.4 (06 Dec 2020 09:40)  HR: 79 (06 Dec 2020 09:40) (75 - 88)  BP: 135/59 (06 Dec 2020 09:40) (117/57 - 135/61)  BP(mean): --  RR: 16 (06 Dec 2020 09:40) (16 - 16)  SpO2: 97% (06 Dec 2020 09:40) (95% - 97%)    PHYSICAL EXAM:  GENERAL: NAD, well-groomed, well-developed  HEAD:  Atraumatic, Normocephalic  EYES: EOMI, PERRLA, conjunctiva and sclera clear  ENMT: Moist mucous membranes  NECK: Supple, No JVD  NERVOUS SYSTEM:  Alert & Oriented X3, Motor Strength 5/5 B/L upper and lower extremities; DTRs 2+ intact and symmetric  CHEST/LUNG: Clear to auscultation bilaterally; No rales, rhonchi, wheezing, or rubs  HEART: Regular rate and rhythm; No murmurs, rubs, or gallops  ABDOMEN: Soft, Nontender, Nondistended; Bowel sounds present  GENITOURINARY- Voiding, no palpable bladder  EXTREMITIES:  2+ Peripheral Pulses, No clubbing, cyanosis, or edema  MUSCULOSKELETAL- No muscle tenderness, Muscle tone normal, No joint tenderness, no Joint swelling, Joint range of motion-normal  SKIN-no rash, no lesion    LABS:                        11.3   3.60  )-----------( 393      ( 05 Dec 2020 06:55 )             35.9     05 Dec 2020 06:55    140    |  109    |  28     ----------------------------<  129    4.1     |  27     |  0.55     Ca    8.7        05 Dec 2020 06:55    RADIOLOGY & ADDITIONAL TESTS:  EXAM:  CT PELVIS BONY ONLY                        EXAM:  CT 3D RECONSTRUCT WO ROCKY                        EXAM:  CT FEMUR ONLY RT                        EXAM:  CT 3D RECONSTRUCT WO ROCKY                        PROCEDURE DATE:  12/03/2020      INTERPRETATION:  CT OF THE PELVIS AND RIGHT FEMUR    CLINICAL INFORMATION: Pelvic and right thigh pain. Inability to ambulate. Question fracture.  TECHNIQUE: Multidetector CT of the pelvis and right femur. The study was performed without the use of intravenous or intra-articular contrast. Multiplanar reformats were generated for review. Three dimensional reconstructions were obtained on an independent workstation. The interpretation of these images is included in the body of the report of the main portion of the study.    COMPARISON: Pelvis and right hip radiographs 3 December 2020 and 16 June 2020.    FINDINGS:    HARDWARE: Patient status post intramedullary ac and cephalomedullary screw fixation of the right femur. A distal transfixion screw is identified. 3 cerclage wires are seen surrounding the proximal subtrochanteric femur. The hardware is intact without periprosthetic lucency to suggest loosening.    BONE: Nondisplaced subacute fracture of the right sacral ala (2:45-70, 602:56). Subacute fracture of the superior ramus, inferior ramus, and body of the left pubis mild surrounding callus formation. Healing spiral fracture of the right proximal femur. No acute fractures are identified. Scattered bone islands are identified.  No dislocation. Multilevel spondylosis of the imaged lumbar spine. Degenerative change of the bilateral sacroiliac joints. Degeneration of the pubic symphysis. Bilateral hip cartilage space narrowing and marginal osteophyte formation.    SOFT TISSUE: Vascular calcifications. Vessels are otherwise normal in course and caliber. Normal appearance of the appendix. Diverticular disease of the colon without evidence of acute diverticulitis. No pelvic free fluid or lymphadenopathy.  Mild swelling and edema of the right piriformis muscle. The muscles are otherwise symmetric in appearance. Scar tissue is identified in the lateral aspect of the right proximal thigh consistent with prior surgical intervention.      IMPRESSION:  1.  No acute fracture. Healing subacute fractures of the right sacral ala, left pubis, and right femur, as described above.  2.  Patient status post ORIF of the right femur. No evidence of hardware complication.  3.  Focal swelling and edema about the right piriformis muscle, suggesting possible piriformis syndrome.      MEDICATIONS  (STANDING):  aspirin enteric coated 81 milliGRAM(s) Oral daily  atorvastatin 80 milliGRAM(s) Oral at bedtime  cyanocobalamin 1000 MICROGram(s) Oral daily  fenofibrate Tablet 145 milliGRAM(s) Oral daily  levothyroxine 50 MICROGram(s) Oral daily  losartan 50 milliGRAM(s) Oral daily  sertraline 100 milliGRAM(s) Oral daily    MEDICATIONS  (PRN):  acetaminophen   Tablet .. 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 D/w pt

## 2020-12-07 VITALS — DIASTOLIC BLOOD PRESSURE: 60 MMHG | SYSTOLIC BLOOD PRESSURE: 134 MMHG

## 2020-12-07 PROCEDURE — 99239 HOSP IP/OBS DSCHRG MGMT >30: CPT

## 2020-12-07 RX ADMIN — PREGABALIN 1000 MICROGRAM(S): 225 CAPSULE ORAL at 09:33

## 2020-12-07 RX ADMIN — Medication 81 MILLIGRAM(S): at 09:33

## 2020-12-07 RX ADMIN — Medication 650 MILLIGRAM(S): at 09:33

## 2020-12-07 RX ADMIN — Medication 145 MILLIGRAM(S): at 09:33

## 2020-12-07 RX ADMIN — Medication 650 MILLIGRAM(S): at 16:11

## 2020-12-07 RX ADMIN — Medication 50 MICROGRAM(S): at 05:48

## 2020-12-07 RX ADMIN — Medication 650 MILLIGRAM(S): at 15:27

## 2020-12-07 RX ADMIN — Medication 650 MILLIGRAM(S): at 10:30

## 2020-12-07 RX ADMIN — LOSARTAN POTASSIUM 50 MILLIGRAM(S): 100 TABLET, FILM COATED ORAL at 09:33

## 2020-12-07 RX ADMIN — SERTRALINE 100 MILLIGRAM(S): 25 TABLET, FILM COATED ORAL at 09:33

## 2020-12-07 RX ADMIN — ENOXAPARIN SODIUM 40 MILLIGRAM(S): 100 INJECTION SUBCUTANEOUS at 09:33

## 2020-12-07 NOTE — DISCHARGE NOTE NURSING/CASE MANAGEMENT/SOCIAL WORK - PATIENT PORTAL LINK FT
You can access the FollowMyHealth Patient Portal offered by [facility] by registering at the following website: [email or web address]. By joining [facility]’s FollowMyHealth portal, you will also be able to view your health information using other applications (apps) compatible with our system.

## 2020-12-07 NOTE — PROGRESS NOTE ADULT - REASON FOR ADMISSION
Right Hip pain
inability to ambulate / left pubic rami fx 2 weeks ago

## 2020-12-07 NOTE — PROGRESS NOTE ADULT - SUBJECTIVE AND OBJECTIVE BOX
Ilda Silva    Patient is a 85y old  Female who presents with a chief complaint of Right Hip pain (04 Dec 2020 01:45)      HPI:  84 y/o Female with a PMHx of acute promyelocytic leukemia, anemia, cataracts, HTN, HLD, hypothyroid, macular degeneration, h/o right femur fracture repair 06/2020 presents to the ED with complain of difficulty ambulating secondary to right hip pain. Patient can not ambulate, can not even stand from wheelchair. Patient was having difficulty ambulating, even with a walker. Patient normally self-ambulates. No fall in last 2-3 weeks. No injury or trauma. No LOC. No fever. No other complain today. No other joint pain.     12/4:  seen at bedside, without c/o, awaiting PT  12/5 no acute events  12/6  up in a chair, doing good  12/7/20- doing good, going to rehab today    Review of system- Rest of the review of system are normal except mentioned in HPI    Vital Signs Last 24 Hrs  T(C): 36.7 (07 Dec 2020 11:06), Max: 37.2 (07 Dec 2020 00:18)  T(F): 98 (07 Dec 2020 11:06), Max: 99 (07 Dec 2020 00:18)  HR: 85 (07 Dec 2020 11:06) (74 - 88)  BP: 145/63 (07 Dec 2020 11:06) (134/60 - 145/63)  BP(mean): --  RR: 16 (07 Dec 2020 11:06) (16 - 17)  SpO2: 99% (07 Dec 2020 11:06) (96% - 99%)    PHYSICAL EXAM:  GENERAL: NAD, well-groomed, well-developed  HEAD:  Atraumatic, Normocephalic  EYES: EOMI, PERRLA, conjunctiva and sclera clear  ENMT: Moist mucous membranes  NECK: Supple, No JVD  NERVOUS SYSTEM:  Alert & Oriented X3, Motor Strength 5/5 B/L upper and lower extremities; DTRs 2+ intact and symmetric  CHEST/LUNG: Clear to auscultation bilaterally; No rales, rhonchi, wheezing, or rubs  HEART: Regular rate and rhythm; No murmurs, rubs, or gallops  ABDOMEN: Soft, Nontender, Nondistended; Bowel sounds present  GENITOURINARY- Voiding, no palpable bladder  EXTREMITIES:  2+ Peripheral Pulses, No clubbing, cyanosis, or edema  MUSCULOSKELETAL- No muscle tenderness, Muscle tone normal, No joint tenderness, no Joint swelling, Joint range of motion-normal  SKIN-no rash, no lesion    LABS:      RADIOLOGY & ADDITIONAL TESTS:  EXAM:  CT PELVIS BONY ONLY                        EXAM[facility]  [facility]                        EXAM:  CT FEMUR ONLY RT                        EXAM:  [facility]                        PROCEDURE DATE:  12/03/2020      INTERPRETATION:  CT OF THE PELVIS AND RIGHT FEMUR    CLINICAL INFORMATION: Pelvic and right thigh pain. Inability to ambulate. Question fracture.  TECHNIQUE: Multidetector CT of the pelvis and right femur. The study was performed without the use of intravenous or intra-articular contrast. Multiplanar reformats were generated for review. Three dimensional reconstructions were obtained on an independent workstation. The interpretation of these images is included in the body of the report of the main portion of the study.    COMPARISON: Pelvis and right hip radiographs 3 December 2020 and 16 June 2020.    FINDINGS:    HARDWARE: Patient status post intramedullary ac and cephalomedullary screw fixation of the right femur. A distal transfixion screw is identified. 3 cerclage wires are seen surrounding the proximal subtrochanteric femur. The hardware is intact without periprosthetic lucency to suggest loosening.    BONE: Nondisplaced subacute fracture of the right sacral ala (2:45-70, 602:56). Subacute fracture of the superior ramus, inferior ramus, and body of the left pubis mild surrounding callus formation. Healing spiral fracture of the right proximal femur. No acute fractures are identified. Scattered bone islands are identified.  No dislocation. Multilevel spondylosis of the imaged lumbar spine. Degenerative change of the bilateral sacroiliac joints. Degeneration of the pubic symphysis. Bilateral hip cartilage space narrowing and marginal osteophyte formation.    SOFT TISSUE: Vascular calcifications. Vessels are otherwise normal in course and caliber. Normal appearance of the appendix. Diverticular disease of the colon without evidence of acute diverticulitis. No pelvic free fluid or lymphadenopathy.  Mild swelling and edema of the right piriformis muscle. The muscles are otherwise symmetric in appearance. Scar tissue is identified in the lateral aspect of the right proximal thigh consistent with prior surgical intervention.      IMPRESSION:  1.  No acute fracture. Healing subacute fractures of the right sacral ala, left pubis, and right femur, as described above.  2.  Patient status post ORIF of the right femur. No evidence of hardware complication.  3.  Focal swelling and edema about the right piriformis muscle, suggesting possible piriformis syndrome.      MEDICATIONS  (STANDING):  aspirin enteric coated 81 milliGRAM(s) Oral daily  atorvastatin 80 milliGRAM(s) Oral at bedtime  cyanocobalamin 1000 MICROGram(s) Oral daily  fenofibrate Tablet 145 milliGRAM(s) Oral daily  levothyroxine 50 MICROGram(s) Oral daily  losartan 50 milliGRAM(s) Oral daily  sertraline 100 milliGRAM(s) Oral daily    MEDICATIONS  (PRN):  acetaminophen   Tablet .. 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 DC time 45 mins. D/w pt

## 2020-12-09 LAB
CULTURE RESULTS: SIGNIFICANT CHANGE UP
SPECIMEN SOURCE: SIGNIFICANT CHANGE UP

## 2020-12-10 DIAGNOSIS — G57.02 LESION OF SCIATIC NERVE, LEFT LOWER LIMB: ICD-10-CM

## 2020-12-10 DIAGNOSIS — Z79.82 LONG TERM (CURRENT) USE OF ASPIRIN: ICD-10-CM

## 2020-12-10 DIAGNOSIS — E03.9 HYPOTHYROIDISM, UNSPECIFIED: ICD-10-CM

## 2020-12-10 DIAGNOSIS — H35.30 UNSPECIFIED MACULAR DEGENERATION: ICD-10-CM

## 2020-12-10 DIAGNOSIS — I10 ESSENTIAL (PRIMARY) HYPERTENSION: ICD-10-CM
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
## 2020-12-10 DIAGNOSIS — E78.5 HYPERLIPIDEMIA, UNSPECIFIED: ICD-10-CM

## 2020-12-10 DIAGNOSIS — S32.502A UNSPECIFIED FRACTURE OF LEFT PUBIS, INITIAL ENCOUNTER FOR CLOSED FRACTURE: ICD-10-CM
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
## 2020-12-10 DIAGNOSIS — H26.9 UNSPECIFIED CATARACT: ICD-10-CM

## 2020-12-10 DIAGNOSIS — D46.9 MYELODYSPLASTIC SYNDROME, UNSPECIFIED: ICD-10-CM

## 2021-10-18 NOTE — DISCHARGE NOTE NURSING/CASE MANAGEMENT/SOCIAL WORK - MODE OF TRANSPORTATION
Ms. Gerber was contacted with her genetic test results.      Results:  The comprehensive sequence and rearrangement analyses of Ms. Gerber  specimen indicate that she does NOT have a mutation in any of the genes analyzed.  Please see the enclosed report in media.       These results reduce the possibility that her cancer was caused by underlying hereditary predisposition.  However, there is still the possibility of an unidentifiable mutation in one of these genes or a mutation in another gene associated with a hereditary predisposition toward breast cancer.  In addition, it is possible that Ms. Gerber's cancers occurred sporadically but there is a hereditary predisposition gene in the family. We discussed the availability of updated testing for her sisters based on their paternal aunt's history of early-onset breast cancer.       Empirically, Ms. Gerber's sisters and cousins are at increased risk for breast cancer based on the family history.  As a result, they should discuss increased surveillance via mammogram and possible MRI with their health care providers.       Ms. Gerber was informed of the importance of contacting us on a regular basis to learn if new information is available regarding genetic testing or if there has been a change in her family history. Thank you for allowing us to participate in her care.  Please do not hesitate to contact us with questions or concerns.    Sincerely,    Irina Aparicio MS, [facility]           Licensed Genetic Counselor       
Wheelchair/Stroller/Ambulatory

## 2021-12-27 ENCOUNTER — OUTPATIENT (OUTPATIENT)
Dept: OUTPATIENT SERVICES | Facility: HOSPITAL | Age: 86
LOS: 1 days | End: 2021-12-27
Payer: MEDICARE

## 2021-12-27 ENCOUNTER — OUTPATIENT (OUTPATIENT)
Dept: OUTPATIENT SERVICES | Facility: HOSPITAL | Age: 86
LOS: 1 days | End: 2021-12-27

## 2021-12-27 DIAGNOSIS — Z20.828 CONTACT WITH AND (SUSPECTED) EXPOSURE TO OTHER VIRAL COMMUNICABLE DISEASES: ICD-10-CM

## 2021-12-27 DIAGNOSIS — Z98.890 OTHER SPECIFIED POSTPROCEDURAL STATES: Chronic | ICD-10-CM

## 2021-12-27 DIAGNOSIS — S72.90XA UNSPECIFIED FRACTURE OF UNSPECIFIED FEMUR, INITIAL ENCOUNTER FOR CLOSED FRACTURE: Chronic | ICD-10-CM

## 2021-12-27 DIAGNOSIS — D64.9 ANEMIA, UNSPECIFIED: ICD-10-CM

## 2021-12-27 LAB — SARS-COV-2 RNA SPEC QL NAA+PROBE: SIGNIFICANT CHANGE UP

## 2021-12-27 PROCEDURE — 86900 BLOOD TYPING SEROLOGIC ABO: CPT

## 2021-12-27 PROCEDURE — 36415 COLL VENOUS BLD VENIPUNCTURE: CPT

## 2021-12-27 PROCEDURE — U0005: CPT

## 2021-12-27 PROCEDURE — 86901 BLOOD TYPING SEROLOGIC RH(D): CPT

## 2021-12-27 PROCEDURE — 86923 COMPATIBILITY TEST ELECTRIC: CPT

## 2021-12-27 PROCEDURE — 86850 RBC ANTIBODY SCREEN: CPT

## 2021-12-27 PROCEDURE — U0003: CPT

## 2021-12-27 PROCEDURE — C9803: CPT

## 2021-12-28 ENCOUNTER — OUTPATIENT (OUTPATIENT)
Dept: OUTPATIENT SERVICES | Facility: HOSPITAL | Age: 86
LOS: 1 days | End: 2021-12-28
Payer: MEDICARE

## 2021-12-28 VITALS
TEMPERATURE: 98 F | OXYGEN SATURATION: 98 % | HEART RATE: 82 BPM | RESPIRATION RATE: 19 BRPM | DIASTOLIC BLOOD PRESSURE: 40 MMHG | SYSTOLIC BLOOD PRESSURE: 108 MMHG

## 2021-12-28 VITALS
WEIGHT: 119.05 LBS | HEIGHT: 58 IN | TEMPERATURE: 98 F | SYSTOLIC BLOOD PRESSURE: 118 MMHG | DIASTOLIC BLOOD PRESSURE: 43 MMHG | HEART RATE: 84 BPM | RESPIRATION RATE: 19 BRPM | OXYGEN SATURATION: 100 %

## 2021-12-28 DIAGNOSIS — Z20.828 CONTACT WITH AND (SUSPECTED) EXPOSURE TO OTHER VIRAL COMMUNICABLE DISEASES: ICD-10-CM

## 2021-12-28 DIAGNOSIS — Z98.890 OTHER SPECIFIED POSTPROCEDURAL STATES: Chronic | ICD-10-CM

## 2021-12-28 DIAGNOSIS — D64.9 ANEMIA, UNSPECIFIED: ICD-10-CM

## 2021-12-28 DIAGNOSIS — S72.90XA UNSPECIFIED FRACTURE OF UNSPECIFIED FEMUR, INITIAL ENCOUNTER FOR CLOSED FRACTURE: Chronic | ICD-10-CM

## 2021-12-28 PROCEDURE — 36430 TRANSFUSION BLD/BLD COMPNT: CPT

## 2021-12-28 PROCEDURE — P9016: CPT

## 2021-12-28 RX ORDER — DIPHENHYDRAMINE HCL 50 MG
25 CAPSULE ORAL ONCE
Refills: 0 | Status: COMPLETED | OUTPATIENT
Start: 2021-12-28 | End: 2021-12-28

## 2021-12-28 RX ORDER — ACETAMINOPHEN 500 MG
650 TABLET ORAL ONCE
Refills: 0 | Status: COMPLETED | OUTPATIENT
Start: 2021-12-28 | End: 2021-12-28

## 2021-12-28 RX ADMIN — Medication 650 MILLIGRAM(S): at 07:33

## 2021-12-28 RX ADMIN — Medication 25 MILLIGRAM(S): at 07:33

## 2021-12-28 RX ADMIN — Medication 650 MILLIGRAM(S): at 08:00

## 2022-02-15 ENCOUNTER — OUTPATIENT (OUTPATIENT)
Dept: OUTPATIENT SERVICES | Facility: HOSPITAL | Age: 87
LOS: 1 days | End: 2022-02-15
Payer: MEDICARE

## 2022-02-15 ENCOUNTER — OUTPATIENT (OUTPATIENT)
Dept: OUTPATIENT SERVICES | Facility: HOSPITAL | Age: 87
LOS: 1 days | Discharge: HOME | End: 2022-02-15
Payer: MEDICARE

## 2022-02-15 DIAGNOSIS — S72.90XA UNSPECIFIED FRACTURE OF UNSPECIFIED FEMUR, INITIAL ENCOUNTER FOR CLOSED FRACTURE: Chronic | ICD-10-CM

## 2022-02-15 DIAGNOSIS — Z98.890 OTHER SPECIFIED POSTPROCEDURAL STATES: Chronic | ICD-10-CM

## 2022-02-15 DIAGNOSIS — D64.9 ANEMIA, UNSPECIFIED: ICD-10-CM

## 2022-02-15 DIAGNOSIS — Z20.828 CONTACT WITH AND (SUSPECTED) EXPOSURE TO OTHER VIRAL COMMUNICABLE DISEASES: ICD-10-CM

## 2022-02-15 PROCEDURE — C9803: CPT

## 2022-02-15 PROCEDURE — 86850 RBC ANTIBODY SCREEN: CPT

## 2022-02-15 PROCEDURE — 36415 COLL VENOUS BLD VENIPUNCTURE: CPT

## 2022-02-15 PROCEDURE — 86901 BLOOD TYPING SEROLOGIC RH(D): CPT

## 2022-02-15 PROCEDURE — U0005: CPT

## 2022-02-15 PROCEDURE — 86900 BLOOD TYPING SEROLOGIC ABO: CPT

## 2022-02-15 PROCEDURE — U0003: CPT

## 2022-02-16 ENCOUNTER — OUTPATIENT (OUTPATIENT)
Dept: OUTPATIENT SERVICES | Facility: HOSPITAL | Age: 87
LOS: 1 days | Discharge: HOME | End: 2022-02-16
Payer: MEDICARE

## 2022-02-16 VITALS
DIASTOLIC BLOOD PRESSURE: 55 MMHG | TEMPERATURE: 99 F | HEART RATE: 79 BPM | OXYGEN SATURATION: 98 % | RESPIRATION RATE: 17 BRPM | SYSTOLIC BLOOD PRESSURE: 134 MMHG

## 2022-02-16 VITALS
DIASTOLIC BLOOD PRESSURE: 53 MMHG | SYSTOLIC BLOOD PRESSURE: 121 MMHG | HEART RATE: 72 BPM | OXYGEN SATURATION: 100 % | TEMPERATURE: 98 F | RESPIRATION RATE: 17 BRPM

## 2022-02-16 DIAGNOSIS — Z20.828 CONTACT WITH AND (SUSPECTED) EXPOSURE TO OTHER VIRAL COMMUNICABLE DISEASES: ICD-10-CM

## 2022-02-16 DIAGNOSIS — D64.9 ANEMIA, UNSPECIFIED: ICD-10-CM

## 2022-02-16 DIAGNOSIS — Z98.890 OTHER SPECIFIED POSTPROCEDURAL STATES: Chronic | ICD-10-CM

## 2022-02-16 DIAGNOSIS — S72.90XA UNSPECIFIED FRACTURE OF UNSPECIFIED FEMUR, INITIAL ENCOUNTER FOR CLOSED FRACTURE: Chronic | ICD-10-CM

## 2022-02-16 PROCEDURE — 36430 TRANSFUSION BLD/BLD COMPNT: CPT

## 2022-02-16 PROCEDURE — 86923 COMPATIBILITY TEST ELECTRIC: CPT

## 2022-02-16 PROCEDURE — P9016: CPT

## 2022-02-16 RX ORDER — ACETAMINOPHEN 500 MG
650 TABLET ORAL ONCE
Refills: 0 | Status: COMPLETED | OUTPATIENT
Start: 2022-02-16 | End: 2022-02-16

## 2022-02-16 RX ORDER — DIPHENHYDRAMINE HCL 50 MG
25 CAPSULE ORAL ONCE
Refills: 0 | Status: COMPLETED | OUTPATIENT
Start: 2022-02-16 | End: 2022-02-16

## 2022-02-16 RX ADMIN — Medication 650 MILLIGRAM(S): at 08:45

## 2022-02-16 RX ADMIN — Medication 650 MILLIGRAM(S): at 08:43

## 2022-02-16 RX ADMIN — Medication 25 MILLIGRAM(S): at 08:43

## 2022-03-31 ENCOUNTER — OUTPATIENT (OUTPATIENT)
Dept: OUTPATIENT SERVICES | Facility: HOSPITAL | Age: 87
LOS: 1 days | End: 2022-03-31
Payer: MEDICARE

## 2022-03-31 DIAGNOSIS — Z98.890 OTHER SPECIFIED POSTPROCEDURAL STATES: Chronic | ICD-10-CM

## 2022-03-31 DIAGNOSIS — D64.9 ANEMIA, UNSPECIFIED: ICD-10-CM

## 2022-03-31 DIAGNOSIS — S72.90XA UNSPECIFIED FRACTURE OF UNSPECIFIED FEMUR, INITIAL ENCOUNTER FOR CLOSED FRACTURE: Chronic | ICD-10-CM

## 2022-03-31 PROCEDURE — 86923 COMPATIBILITY TEST ELECTRIC: CPT

## 2022-03-31 PROCEDURE — 86901 BLOOD TYPING SEROLOGIC RH(D): CPT

## 2022-03-31 PROCEDURE — 86900 BLOOD TYPING SEROLOGIC ABO: CPT

## 2022-03-31 PROCEDURE — 36415 COLL VENOUS BLD VENIPUNCTURE: CPT

## 2022-03-31 PROCEDURE — 86850 RBC ANTIBODY SCREEN: CPT

## 2022-04-01 DIAGNOSIS — D64.9 ANEMIA, UNSPECIFIED: ICD-10-CM

## 2022-04-02 ENCOUNTER — OUTPATIENT (OUTPATIENT)
Dept: OUTPATIENT SERVICES | Facility: HOSPITAL | Age: 87
LOS: 1 days | End: 2022-04-02
Payer: MEDICARE

## 2022-04-02 VITALS
TEMPERATURE: 98 F | RESPIRATION RATE: 19 BRPM | OXYGEN SATURATION: 100 % | DIASTOLIC BLOOD PRESSURE: 48 MMHG | SYSTOLIC BLOOD PRESSURE: 115 MMHG | HEART RATE: 75 BPM

## 2022-04-02 VITALS
RESPIRATION RATE: 19 BRPM | TEMPERATURE: 98 F | SYSTOLIC BLOOD PRESSURE: 111 MMHG | DIASTOLIC BLOOD PRESSURE: 86 MMHG | OXYGEN SATURATION: 99 % | HEART RATE: 77 BPM

## 2022-04-02 DIAGNOSIS — D64.9 ANEMIA, UNSPECIFIED: ICD-10-CM

## 2022-04-02 DIAGNOSIS — Z98.890 OTHER SPECIFIED POSTPROCEDURAL STATES: Chronic | ICD-10-CM

## 2022-04-02 DIAGNOSIS — S72.90XA UNSPECIFIED FRACTURE OF UNSPECIFIED FEMUR, INITIAL ENCOUNTER FOR CLOSED FRACTURE: Chronic | ICD-10-CM

## 2022-04-02 PROCEDURE — P9016: CPT

## 2022-04-02 PROCEDURE — 36430 TRANSFUSION BLD/BLD COMPNT: CPT

## 2022-04-02 RX ORDER — DIPHENHYDRAMINE HCL 50 MG
25 CAPSULE ORAL ONCE
Refills: 0 | Status: COMPLETED | OUTPATIENT
Start: 2022-04-02 | End: 2022-04-02

## 2022-04-02 RX ORDER — ACETAMINOPHEN 500 MG
650 TABLET ORAL ONCE
Refills: 0 | Status: COMPLETED | OUTPATIENT
Start: 2022-04-02 | End: 2022-04-02

## 2022-04-02 RX ADMIN — Medication 650 MILLIGRAM(S): at 08:40

## 2022-04-02 RX ADMIN — Medication 25 MILLIGRAM(S): at 08:15

## 2022-04-02 RX ADMIN — Medication 650 MILLIGRAM(S): at 08:14

## 2022-05-19 ENCOUNTER — OUTPATIENT (OUTPATIENT)
Dept: OUTPATIENT SERVICES | Facility: HOSPITAL | Age: 87
LOS: 1 days | Discharge: HOME | End: 2022-05-19
Payer: MEDICARE

## 2022-05-19 DIAGNOSIS — D64.9 ANEMIA, UNSPECIFIED: ICD-10-CM

## 2022-05-19 DIAGNOSIS — Z98.890 OTHER SPECIFIED POSTPROCEDURAL STATES: Chronic | ICD-10-CM

## 2022-05-19 DIAGNOSIS — S72.90XA UNSPECIFIED FRACTURE OF UNSPECIFIED FEMUR, INITIAL ENCOUNTER FOR CLOSED FRACTURE: Chronic | ICD-10-CM

## 2022-05-19 PROCEDURE — 86850 RBC ANTIBODY SCREEN: CPT

## 2022-05-19 PROCEDURE — 86901 BLOOD TYPING SEROLOGIC RH(D): CPT

## 2022-05-19 PROCEDURE — 86900 BLOOD TYPING SEROLOGIC ABO: CPT

## 2022-05-19 PROCEDURE — 86923 COMPATIBILITY TEST ELECTRIC: CPT

## 2022-05-19 PROCEDURE — 36415 COLL VENOUS BLD VENIPUNCTURE: CPT

## 2022-05-20 ENCOUNTER — OUTPATIENT (OUTPATIENT)
Dept: OUTPATIENT SERVICES | Facility: HOSPITAL | Age: 87
LOS: 1 days | Discharge: HOME | End: 2022-05-20
Payer: MEDICARE

## 2022-05-20 VITALS
SYSTOLIC BLOOD PRESSURE: 118 MMHG | DIASTOLIC BLOOD PRESSURE: 43 MMHG | HEART RATE: 68 BPM | RESPIRATION RATE: 17 BRPM | OXYGEN SATURATION: 98 % | TEMPERATURE: 98 F

## 2022-05-20 VITALS
OXYGEN SATURATION: 100 % | HEART RATE: 78 BPM | DIASTOLIC BLOOD PRESSURE: 52 MMHG | RESPIRATION RATE: 18 BRPM | SYSTOLIC BLOOD PRESSURE: 155 MMHG | TEMPERATURE: 98 F

## 2022-05-20 DIAGNOSIS — S72.90XA UNSPECIFIED FRACTURE OF UNSPECIFIED FEMUR, INITIAL ENCOUNTER FOR CLOSED FRACTURE: Chronic | ICD-10-CM

## 2022-05-20 DIAGNOSIS — D64.9 ANEMIA, UNSPECIFIED: ICD-10-CM

## 2022-05-20 DIAGNOSIS — Z98.890 OTHER SPECIFIED POSTPROCEDURAL STATES: Chronic | ICD-10-CM

## 2022-05-20 PROCEDURE — P9016: CPT

## 2022-05-20 PROCEDURE — 36430 TRANSFUSION BLD/BLD COMPNT: CPT

## 2022-05-20 RX ORDER — ACETAMINOPHEN 500 MG
650 TABLET ORAL ONCE
Refills: 0 | Status: COMPLETED | OUTPATIENT
Start: 2022-05-20 | End: 2022-05-20

## 2022-05-20 RX ORDER — DIPHENHYDRAMINE HCL 50 MG
25 CAPSULE ORAL ONCE
Refills: 0 | Status: COMPLETED | OUTPATIENT
Start: 2022-05-20 | End: 2022-05-20

## 2022-05-20 RX ADMIN — Medication 650 MILLIGRAM(S): at 07:28

## 2022-05-20 RX ADMIN — Medication 25 MILLIGRAM(S): at 07:28

## 2022-06-22 ENCOUNTER — OUTPATIENT (OUTPATIENT)
Dept: OUTPATIENT SERVICES | Facility: HOSPITAL | Age: 87
LOS: 1 days | Discharge: HOME | End: 2022-06-22
Payer: MEDICARE

## 2022-06-22 DIAGNOSIS — D64.9 ANEMIA, UNSPECIFIED: ICD-10-CM

## 2022-06-22 DIAGNOSIS — Z98.890 OTHER SPECIFIED POSTPROCEDURAL STATES: Chronic | ICD-10-CM

## 2022-06-22 DIAGNOSIS — S72.90XA UNSPECIFIED FRACTURE OF UNSPECIFIED FEMUR, INITIAL ENCOUNTER FOR CLOSED FRACTURE: Chronic | ICD-10-CM

## 2022-06-22 PROCEDURE — 86850 RBC ANTIBODY SCREEN: CPT

## 2022-06-22 PROCEDURE — 86923 COMPATIBILITY TEST ELECTRIC: CPT

## 2022-06-22 PROCEDURE — 86901 BLOOD TYPING SEROLOGIC RH(D): CPT

## 2022-06-22 PROCEDURE — 36415 COLL VENOUS BLD VENIPUNCTURE: CPT

## 2022-06-22 PROCEDURE — 86900 BLOOD TYPING SEROLOGIC ABO: CPT

## 2022-06-23 ENCOUNTER — OUTPATIENT (OUTPATIENT)
Dept: OUTPATIENT SERVICES | Facility: HOSPITAL | Age: 87
LOS: 1 days | Discharge: HOME | End: 2022-06-23
Payer: MEDICARE

## 2022-06-23 VITALS
RESPIRATION RATE: 18 BRPM | DIASTOLIC BLOOD PRESSURE: 55 MMHG | OXYGEN SATURATION: 100 % | TEMPERATURE: 98 F | SYSTOLIC BLOOD PRESSURE: 145 MMHG | HEART RATE: 81 BPM

## 2022-06-23 VITALS
TEMPERATURE: 99 F | SYSTOLIC BLOOD PRESSURE: 115 MMHG | RESPIRATION RATE: 16 BRPM | HEART RATE: 68 BPM | DIASTOLIC BLOOD PRESSURE: 43 MMHG | OXYGEN SATURATION: 98 %

## 2022-06-23 DIAGNOSIS — Z98.890 OTHER SPECIFIED POSTPROCEDURAL STATES: Chronic | ICD-10-CM

## 2022-06-23 DIAGNOSIS — S72.90XA UNSPECIFIED FRACTURE OF UNSPECIFIED FEMUR, INITIAL ENCOUNTER FOR CLOSED FRACTURE: Chronic | ICD-10-CM

## 2022-06-23 DIAGNOSIS — D64.9 ANEMIA, UNSPECIFIED: ICD-10-CM

## 2022-06-23 PROCEDURE — 36430 TRANSFUSION BLD/BLD COMPNT: CPT

## 2022-06-23 PROCEDURE — P9016: CPT

## 2022-06-23 RX ORDER — DIPHENHYDRAMINE HCL 50 MG
25 CAPSULE ORAL ONCE
Refills: 0 | Status: COMPLETED | OUTPATIENT
Start: 2022-06-23 | End: 2022-06-23

## 2022-06-23 RX ORDER — ACETAMINOPHEN 500 MG
650 TABLET ORAL ONCE
Refills: 0 | Status: COMPLETED | OUTPATIENT
Start: 2022-06-23 | End: 2022-06-23

## 2022-06-23 RX ADMIN — Medication 650 MILLIGRAM(S): at 07:25

## 2022-07-19 ENCOUNTER — OUTPATIENT (OUTPATIENT)
Dept: OUTPATIENT SERVICES | Facility: HOSPITAL | Age: 87
LOS: 1 days | End: 2022-07-19
Payer: MEDICARE

## 2022-07-19 DIAGNOSIS — D46.9 MYELODYSPLASTIC SYNDROME, UNSPECIFIED: ICD-10-CM

## 2022-07-19 DIAGNOSIS — S72.90XA UNSPECIFIED FRACTURE OF UNSPECIFIED FEMUR, INITIAL ENCOUNTER FOR CLOSED FRACTURE: Chronic | ICD-10-CM

## 2022-07-19 DIAGNOSIS — Z98.890 OTHER SPECIFIED POSTPROCEDURAL STATES: Chronic | ICD-10-CM

## 2022-07-19 PROCEDURE — 86923 COMPATIBILITY TEST ELECTRIC: CPT

## 2022-07-19 PROCEDURE — 86901 BLOOD TYPING SEROLOGIC RH(D): CPT

## 2022-07-19 PROCEDURE — 36415 COLL VENOUS BLD VENIPUNCTURE: CPT

## 2022-07-19 PROCEDURE — 86850 RBC ANTIBODY SCREEN: CPT

## 2022-07-19 PROCEDURE — 86900 BLOOD TYPING SEROLOGIC ABO: CPT

## 2022-07-20 DIAGNOSIS — D46.9 MYELODYSPLASTIC SYNDROME, UNSPECIFIED: ICD-10-CM

## 2022-07-21 ENCOUNTER — OUTPATIENT (OUTPATIENT)
Dept: OUTPATIENT SERVICES | Facility: HOSPITAL | Age: 87
LOS: 1 days | End: 2022-07-21
Payer: MEDICARE

## 2022-07-21 VITALS
OXYGEN SATURATION: 99 % | TEMPERATURE: 99 F | HEART RATE: 76 BPM | DIASTOLIC BLOOD PRESSURE: 47 MMHG | RESPIRATION RATE: 18 BRPM | SYSTOLIC BLOOD PRESSURE: 131 MMHG

## 2022-07-21 VITALS
DIASTOLIC BLOOD PRESSURE: 64 MMHG | SYSTOLIC BLOOD PRESSURE: 127 MMHG | OXYGEN SATURATION: 100 % | TEMPERATURE: 98 F | RESPIRATION RATE: 18 BRPM | HEART RATE: 86 BPM

## 2022-07-21 DIAGNOSIS — Z98.890 OTHER SPECIFIED POSTPROCEDURAL STATES: Chronic | ICD-10-CM

## 2022-07-21 DIAGNOSIS — S72.90XA UNSPECIFIED FRACTURE OF UNSPECIFIED FEMUR, INITIAL ENCOUNTER FOR CLOSED FRACTURE: Chronic | ICD-10-CM

## 2022-07-21 DIAGNOSIS — D46.9 MYELODYSPLASTIC SYNDROME, UNSPECIFIED: ICD-10-CM

## 2022-07-21 PROCEDURE — 36430 TRANSFUSION BLD/BLD COMPNT: CPT

## 2022-07-21 PROCEDURE — P9016: CPT

## 2022-07-21 RX ORDER — DIPHENHYDRAMINE HCL 50 MG
25 CAPSULE ORAL ONCE
Refills: 0 | Status: COMPLETED | OUTPATIENT
Start: 2022-07-21 | End: 2022-07-21

## 2022-07-21 RX ORDER — ACETAMINOPHEN 500 MG
650 TABLET ORAL ONCE
Refills: 0 | Status: COMPLETED | OUTPATIENT
Start: 2022-07-21 | End: 2022-07-21

## 2022-07-21 RX ADMIN — Medication 25 MILLIGRAM(S): at 07:37

## 2022-07-21 RX ADMIN — Medication 650 MILLIGRAM(S): at 07:36

## 2022-07-21 RX ADMIN — Medication 650 MILLIGRAM(S): at 08:00

## 2022-08-18 ENCOUNTER — EMERGENCY (EMERGENCY)
Facility: HOSPITAL | Age: 87
LOS: 0 days | Discharge: HOME | End: 2022-08-18
Attending: EMERGENCY MEDICINE
Payer: MEDICARE

## 2022-08-18 VITALS — WEIGHT: 117.95 LBS | HEIGHT: 58 IN

## 2022-08-18 VITALS
SYSTOLIC BLOOD PRESSURE: 122 MMHG | RESPIRATION RATE: 18 BRPM | OXYGEN SATURATION: 95 % | TEMPERATURE: 99 F | HEART RATE: 75 BPM | DIASTOLIC BLOOD PRESSURE: 67 MMHG

## 2022-08-18 DIAGNOSIS — Z79.01 LONG TERM (CURRENT) USE OF ANTICOAGULANTS: ICD-10-CM

## 2022-08-18 DIAGNOSIS — H35.30 UNSPECIFIED MACULAR DEGENERATION: ICD-10-CM

## 2022-08-18 DIAGNOSIS — R06.02 SHORTNESS OF BREATH: ICD-10-CM

## 2022-08-18 DIAGNOSIS — I10 ESSENTIAL (PRIMARY) HYPERTENSION: ICD-10-CM

## 2022-08-18 DIAGNOSIS — C92.40 ACUTE PROMYELOCYTIC LEUKEMIA, NOT HAVING ACHIEVED REMISSION: ICD-10-CM

## 2022-08-18 DIAGNOSIS — Z79.82 LONG TERM (CURRENT) USE OF ASPIRIN: ICD-10-CM

## 2022-08-18 DIAGNOSIS — Z98.890 OTHER SPECIFIED POSTPROCEDURAL STATES: Chronic | ICD-10-CM

## 2022-08-18 DIAGNOSIS — D64.9 ANEMIA, UNSPECIFIED: ICD-10-CM

## 2022-08-18 DIAGNOSIS — Z20.822 CONTACT WITH AND (SUSPECTED) EXPOSURE TO COVID-19: ICD-10-CM

## 2022-08-18 DIAGNOSIS — E03.9 HYPOTHYROIDISM, UNSPECIFIED: ICD-10-CM

## 2022-08-18 DIAGNOSIS — S72.90XA UNSPECIFIED FRACTURE OF UNSPECIFIED FEMUR, INITIAL ENCOUNTER FOR CLOSED FRACTURE: Chronic | ICD-10-CM

## 2022-08-18 DIAGNOSIS — E78.5 HYPERLIPIDEMIA, UNSPECIFIED: ICD-10-CM

## 2022-08-18 LAB
ALBUMIN SERPL ELPH-MCNC: 3.2 G/DL — LOW (ref 3.3–5)
ALP SERPL-CCNC: 33 U/L — LOW (ref 40–120)
ALT FLD-CCNC: 23 U/L — SIGNIFICANT CHANGE UP (ref 12–78)
ANION GAP SERPL CALC-SCNC: 6 MMOL/L — SIGNIFICANT CHANGE UP (ref 5–17)
APTT BLD: 38.1 SEC — HIGH (ref 27.5–35.5)
AST SERPL-CCNC: 20 U/L — SIGNIFICANT CHANGE UP (ref 15–37)
BASOPHILS # BLD AUTO: 0.09 K/UL — SIGNIFICANT CHANGE UP (ref 0–0.2)
BASOPHILS NFR BLD AUTO: 2 % — SIGNIFICANT CHANGE UP (ref 0–2)
BILIRUB SERPL-MCNC: 0.3 MG/DL — SIGNIFICANT CHANGE UP (ref 0.2–1.2)
BUN SERPL-MCNC: 36 MG/DL — HIGH (ref 7–23)
CALCIUM SERPL-MCNC: 8.5 MG/DL — SIGNIFICANT CHANGE UP (ref 8.5–10.1)
CHLORIDE SERPL-SCNC: 109 MMOL/L — HIGH (ref 96–108)
CO2 SERPL-SCNC: 24 MMOL/L — SIGNIFICANT CHANGE UP (ref 22–31)
CREAT SERPL-MCNC: 0.87 MG/DL — SIGNIFICANT CHANGE UP (ref 0.5–1.3)
EGFR: 66 ML/MIN/1.73M2 — SIGNIFICANT CHANGE UP
EOSINOPHIL # BLD AUTO: 0.22 K/UL — SIGNIFICANT CHANGE UP (ref 0–0.5)
EOSINOPHIL NFR BLD AUTO: 5 % — SIGNIFICANT CHANGE UP (ref 0–6)
GLUCOSE SERPL-MCNC: 129 MG/DL — HIGH (ref 70–99)
HCT VFR BLD CALC: 19.8 % — CRITICAL LOW (ref 34.5–45)
HGB BLD-MCNC: 6.6 G/DL — CRITICAL LOW (ref 11.5–15.5)
INR BLD: 1.24 RATIO — HIGH (ref 0.88–1.16)
LYMPHOCYTES # BLD AUTO: 1.03 K/UL — SIGNIFICANT CHANGE UP (ref 1–3.3)
LYMPHOCYTES # BLD AUTO: 23 % — SIGNIFICANT CHANGE UP (ref 13–44)
MCHC RBC-ENTMCNC: 33.3 GM/DL — SIGNIFICANT CHANGE UP (ref 32–36)
MCHC RBC-ENTMCNC: 33.5 PG — SIGNIFICANT CHANGE UP (ref 27–34)
MCV RBC AUTO: 100.5 FL — HIGH (ref 80–100)
MONOCYTES # BLD AUTO: 0.4 K/UL — SIGNIFICANT CHANGE UP (ref 0–0.9)
MONOCYTES NFR BLD AUTO: 9 % — SIGNIFICANT CHANGE UP (ref 2–14)
NEUTROPHILS # BLD AUTO: 2.29 K/UL — SIGNIFICANT CHANGE UP (ref 1.8–7.4)
NEUTROPHILS NFR BLD AUTO: 51 % — SIGNIFICANT CHANGE UP (ref 43–77)
NRBC # BLD: SIGNIFICANT CHANGE UP /100 WBCS (ref 0–0)
PLATELET # BLD AUTO: 401 K/UL — HIGH (ref 150–400)
POTASSIUM SERPL-MCNC: 4.2 MMOL/L — SIGNIFICANT CHANGE UP (ref 3.5–5.3)
POTASSIUM SERPL-SCNC: 4.2 MMOL/L — SIGNIFICANT CHANGE UP (ref 3.5–5.3)
PROT SERPL-MCNC: 6.5 GM/DL — SIGNIFICANT CHANGE UP (ref 6–8.3)
PROTHROM AB SERPL-ACNC: 14.4 SEC — HIGH (ref 10.5–13.4)
RBC # BLD: 1.97 M/UL — LOW (ref 3.8–5.2)
RBC # FLD: 19.1 % — HIGH (ref 10.3–14.5)
SARS-COV-2 RNA SPEC QL NAA+PROBE: SIGNIFICANT CHANGE UP
SODIUM SERPL-SCNC: 139 MMOL/L — SIGNIFICANT CHANGE UP (ref 135–145)
WBC # BLD: 4.49 K/UL — SIGNIFICANT CHANGE UP (ref 3.8–10.5)
WBC # FLD AUTO: 4.49 K/UL — SIGNIFICANT CHANGE UP (ref 3.8–10.5)

## 2022-08-18 PROCEDURE — U0003: CPT

## 2022-08-18 PROCEDURE — 80053 COMPREHEN METABOLIC PANEL: CPT

## 2022-08-18 PROCEDURE — U0005: CPT

## 2022-08-18 PROCEDURE — 93010 ELECTROCARDIOGRAM REPORT: CPT

## 2022-08-18 PROCEDURE — 85610 PROTHROMBIN TIME: CPT

## 2022-08-18 PROCEDURE — P9016: CPT | Mod: XU

## 2022-08-18 PROCEDURE — 86900 BLOOD TYPING SEROLOGIC ABO: CPT

## 2022-08-18 PROCEDURE — 85730 THROMBOPLASTIN TIME PARTIAL: CPT

## 2022-08-18 PROCEDURE — 36415 COLL VENOUS BLD VENIPUNCTURE: CPT

## 2022-08-18 PROCEDURE — 85025 COMPLETE CBC W/AUTO DIFF WBC: CPT

## 2022-08-18 PROCEDURE — P9040: CPT

## 2022-08-18 PROCEDURE — 71045 X-RAY EXAM CHEST 1 VIEW: CPT

## 2022-08-18 PROCEDURE — 99285 EMERGENCY DEPT VISIT HI MDM: CPT | Mod: 25

## 2022-08-18 PROCEDURE — 71045 X-RAY EXAM CHEST 1 VIEW: CPT | Mod: 26

## 2022-08-18 PROCEDURE — 86850 RBC ANTIBODY SCREEN: CPT

## 2022-08-18 PROCEDURE — 86923 COMPATIBILITY TEST ELECTRIC: CPT

## 2022-08-18 PROCEDURE — 93005 ELECTROCARDIOGRAM TRACING: CPT

## 2022-08-18 PROCEDURE — 36430 TRANSFUSION BLD/BLD COMPNT: CPT

## 2022-08-18 PROCEDURE — 99284 EMERGENCY DEPT VISIT MOD MDM: CPT

## 2022-08-18 PROCEDURE — 86901 BLOOD TYPING SEROLOGIC RH(D): CPT

## 2022-08-18 NOTE — ED ADULT NURSE NOTE - OBJECTIVE STATEMENT
pt arrives to ED sent in by MD for low hgb. Pt complaining of weakness, LE "heaviness." denies active bleeding. denies black, tarry stools. alert and oriented x 4. ambulatory.

## 2022-08-18 NOTE — ED ADULT TRIAGE NOTE - CHIEF COMPLAINT QUOTE
Pt presents to ER c/o low hemoglobin from blood work yesterday. Pt reports she has felt tired over the past few days. Denies blood in stool/urine

## 2022-08-18 NOTE — ED PROVIDER NOTE - CLINICAL SUMMARY MEDICAL DECISION MAKING FREE TEXT BOX
elderly f with MDS, promyelocytic leukemia, htn, hld presents with anemia.  Patient states she occasionally feels sob, will check labs, ekg.

## 2022-08-18 NOTE — ED PROVIDER NOTE - OBJECTIVE STATEMENT
85 yo f with htn, hld, acute promyelocytic leukemia presents to the ed with symptomatic anemia.  Per patient, she has had multiple blood transfusions for this in the past. 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  She occasionally feels sob. No dark stools, no precipitating, exacerbating or alleviating factors

## 2022-08-18 NOTE — ED PROVIDER NOTE - PROGRESS NOTE DETAILS
d/w dr yan who agrees with transfusion and dc home CC:  Signout received from Dr. Eugene: pt receiving 2 units PRBCs in progress, for D/C home afterwards for Oncology office f/u. CC:  transfusions completed, informed by ED RN no complications, VSS.  Pt stable for D/c home at this time.

## 2022-08-18 NOTE — ED PROVIDER NOTE - NSFOLLOWUPINSTRUCTIONS_ED_ALL_ED_FT
While in ED, you received 2 units of Packed red Blood Cells.  Continue your regular medications as per routine.  Follow up Dr. Arreaga.        Blood Transfusion, Adult, Care After      This sheet gives you information about how to care for yourself after your procedure. Your health care provider may also give you more specific instructions. If you have problems or questions, contact your health care provider.      What can I expect after the procedure?    After the procedure, it is common to have:  •Bruising and soreness where the IV was inserted.      •A headache.        Follow these instructions at home:      IV insertion site care       Washing hands with soap and water.       A normal insertion site compared to an infected insertion site. The infected insertion site has swelling and redness.   •Follow instructions from your health care provider about how to take care of your IV insertion site. Make sure you:  •Wash your hands with soap and water before and after you change your bandage (dressing). If soap and water are not available, use hand .      •Change your dressing as told by your health care provider.      •Check your IV insertion site every day for signs of infection. Check for:  •Redness, swelling, or pain.      •Bleeding from the site.      •Warmth.      •Pus or a bad smell.        General instructions     •Take over-the-counter and prescription medicines only as told by your health care provider.      •Rest as told by your health care provider.      •Return to your normal activities as told by your health care provider.      •Keep all follow-up visits as told by your health care provider. This is important.        Contact a health care provider if:    •You have itching or red, swollen areas of skin (hives).      •You feel anxious.      •You feel weak after doing your normal activities.      •You have redness, swelling, warmth, or pain around the IV insertion site.      •You have blood coming from the IV insertion site that does not stop with pressure.      •You have pus or a bad smell coming from your IV insertion site.        Get help right away if:  •You have symptoms of a serious allergic or immune system reaction, including:  •Trouble breathing or shortness of breath.      •Swelling of the face or feeling flushed.      •Fever or chills.      •Pain in the head, back, or chest.      •Dark urine or blood in the urine.      •Widespread rash.      •Fast heartbeat.      •Feeling dizzy or light-headed.        If you receive your blood transfusion in an outpatient setting, you will be told whom to contact to report any reactions.    These symptoms may represent a serious problem that is an emergency. Do not wait to see if the symptoms will go away. Get medical help right away. Call your local emergency services (911 in the [place] Do not drive yourself to the hospital.       Summary    •Bruising and tenderness around the IV insertion site are common.      •Check your IV insertion site every day for signs of infection.      •Rest as told by your health care provider. Return to your normal activities as told by your health care provider.      •Get help right away for symptoms of a serious allergic or immune system reaction to blood transfusion.      This information is not intended to replace advice given to you by your health care provider. Make sure you discuss any questions you have with your health care provider.

## 2022-09-08 ENCOUNTER — OUTPATIENT (OUTPATIENT)
Dept: OUTPATIENT SERVICES | Facility: HOSPITAL | Age: 87
LOS: 1 days | Discharge: HOME | End: 2022-09-08
Payer: MEDICARE

## 2022-09-08 DIAGNOSIS — S72.90XA UNSPECIFIED FRACTURE OF UNSPECIFIED FEMUR, INITIAL ENCOUNTER FOR CLOSED FRACTURE: Chronic | ICD-10-CM

## 2022-09-08 DIAGNOSIS — Z98.890 OTHER SPECIFIED POSTPROCEDURAL STATES: Chronic | ICD-10-CM

## 2022-09-08 DIAGNOSIS — D46.9 MYELODYSPLASTIC SYNDROME, UNSPECIFIED: ICD-10-CM

## 2022-09-08 PROCEDURE — 86900 BLOOD TYPING SEROLOGIC ABO: CPT

## 2022-09-08 PROCEDURE — 36415 COLL VENOUS BLD VENIPUNCTURE: CPT

## 2022-09-08 PROCEDURE — 86901 BLOOD TYPING SEROLOGIC RH(D): CPT

## 2022-09-08 PROCEDURE — 86923 COMPATIBILITY TEST ELECTRIC: CPT

## 2022-09-08 PROCEDURE — 86850 RBC ANTIBODY SCREEN: CPT

## 2022-09-08 RX ORDER — DIPHENHYDRAMINE HCL 50 MG
25 CAPSULE ORAL ONCE
Refills: 0 | Status: DISCONTINUED | OUTPATIENT
Start: 2022-09-08 | End: 2022-09-08

## 2022-09-08 RX ORDER — ACETAMINOPHEN 500 MG
650 TABLET ORAL ONCE
Refills: 0 | Status: DISCONTINUED | OUTPATIENT
Start: 2022-09-08 | End: 2022-09-08

## 2022-09-09 ENCOUNTER — OUTPATIENT (OUTPATIENT)
Dept: OUTPATIENT SERVICES | Facility: HOSPITAL | Age: 87
LOS: 1 days | Discharge: HOME | End: 2022-09-09
Payer: MEDICARE

## 2022-09-09 VITALS
SYSTOLIC BLOOD PRESSURE: 120 MMHG | RESPIRATION RATE: 17 BRPM | OXYGEN SATURATION: 98 % | HEART RATE: 68 BPM | DIASTOLIC BLOOD PRESSURE: 50 MMHG | TEMPERATURE: 98 F

## 2022-09-09 VITALS
RESPIRATION RATE: 19 BRPM | SYSTOLIC BLOOD PRESSURE: 131 MMHG | OXYGEN SATURATION: 100 % | HEART RATE: 78 BPM | DIASTOLIC BLOOD PRESSURE: 67 MMHG | TEMPERATURE: 98 F

## 2022-09-09 DIAGNOSIS — Z98.890 OTHER SPECIFIED POSTPROCEDURAL STATES: Chronic | ICD-10-CM

## 2022-09-09 DIAGNOSIS — S72.90XA UNSPECIFIED FRACTURE OF UNSPECIFIED FEMUR, INITIAL ENCOUNTER FOR CLOSED FRACTURE: Chronic | ICD-10-CM

## 2022-09-09 DIAGNOSIS — D46.9 MYELODYSPLASTIC SYNDROME, UNSPECIFIED: ICD-10-CM

## 2022-09-09 PROCEDURE — P9016: CPT

## 2022-09-09 PROCEDURE — 36430 TRANSFUSION BLD/BLD COMPNT: CPT

## 2022-09-09 RX ORDER — DIPHENHYDRAMINE HCL 50 MG
25 CAPSULE ORAL ONCE
Refills: 0 | Status: COMPLETED | OUTPATIENT
Start: 2022-09-09 | End: 2022-09-09

## 2022-09-09 RX ORDER — ACETAMINOPHEN 500 MG
650 TABLET ORAL ONCE
Refills: 0 | Status: COMPLETED | OUTPATIENT
Start: 2022-09-09 | End: 2022-09-09

## 2022-09-09 RX ADMIN — Medication 650 MILLIGRAM(S): at 07:39

## 2022-09-09 RX ADMIN — Medication 650 MILLIGRAM(S): at 08:15

## 2022-09-09 RX ADMIN — Medication 25 MILLIGRAM(S): at 07:39

## 2022-09-19 NOTE — H&P ADULT - NSHPSOURCEINFORD_GEN_ALL_CORE
Patient Duration Of Freeze Thaw-Cycle (Seconds): 7 Post-Care Instructions: I reviewed with the patient in detail post-care instructions. Patient is to wear sunprotection, and avoid picking at any of the treated lesions. Pt may apply Vaseline to crusted or scabbing areas. Number Of Freeze-Thaw Cycles: 1 freeze-thaw cycle Render Post-Care Instructions In Note?: no Show Aperture Variable?: Yes Consent: The patient's consent was obtained including but not limited to risks of crusting, scabbing, blistering, scarring, darker or lighter pigmentary change, recurrence, incomplete removal and infection. Detail Level: Detailed

## 2022-10-04 ENCOUNTER — OUTPATIENT (OUTPATIENT)
Dept: OUTPATIENT SERVICES | Facility: HOSPITAL | Age: 87
LOS: 1 days | Discharge: HOME | End: 2022-10-04
Payer: MEDICARE

## 2022-10-04 DIAGNOSIS — S72.90XA UNSPECIFIED FRACTURE OF UNSPECIFIED FEMUR, INITIAL ENCOUNTER FOR CLOSED FRACTURE: Chronic | ICD-10-CM

## 2022-10-04 DIAGNOSIS — Z98.890 OTHER SPECIFIED POSTPROCEDURAL STATES: Chronic | ICD-10-CM

## 2022-10-04 DIAGNOSIS — D46.9 MYELODYSPLASTIC SYNDROME, UNSPECIFIED: ICD-10-CM

## 2022-10-04 PROCEDURE — 36415 COLL VENOUS BLD VENIPUNCTURE: CPT

## 2022-10-04 PROCEDURE — 86923 COMPATIBILITY TEST ELECTRIC: CPT

## 2022-10-04 PROCEDURE — 86900 BLOOD TYPING SEROLOGIC ABO: CPT

## 2022-10-04 PROCEDURE — 86901 BLOOD TYPING SEROLOGIC RH(D): CPT

## 2022-10-04 PROCEDURE — 86850 RBC ANTIBODY SCREEN: CPT

## 2022-10-05 ENCOUNTER — OUTPATIENT (OUTPATIENT)
Dept: OUTPATIENT SERVICES | Facility: HOSPITAL | Age: 87
LOS: 1 days | Discharge: HOME | End: 2022-10-05
Payer: MEDICARE

## 2022-10-05 VITALS
OXYGEN SATURATION: 98 % | DIASTOLIC BLOOD PRESSURE: 43 MMHG | SYSTOLIC BLOOD PRESSURE: 114 MMHG | TEMPERATURE: 99 F | RESPIRATION RATE: 17 BRPM | HEART RATE: 71 BPM

## 2022-10-05 VITALS
RESPIRATION RATE: 17 BRPM | OXYGEN SATURATION: 99 % | TEMPERATURE: 98 F | DIASTOLIC BLOOD PRESSURE: 53 MMHG | HEART RATE: 85 BPM | SYSTOLIC BLOOD PRESSURE: 129 MMHG

## 2022-10-05 DIAGNOSIS — D46.9 MYELODYSPLASTIC SYNDROME, UNSPECIFIED: ICD-10-CM

## 2022-10-05 DIAGNOSIS — S72.90XA UNSPECIFIED FRACTURE OF UNSPECIFIED FEMUR, INITIAL ENCOUNTER FOR CLOSED FRACTURE: Chronic | ICD-10-CM

## 2022-10-05 DIAGNOSIS — Z98.890 OTHER SPECIFIED POSTPROCEDURAL STATES: Chronic | ICD-10-CM

## 2022-10-05 PROCEDURE — 36430 TRANSFUSION BLD/BLD COMPNT: CPT

## 2022-10-05 PROCEDURE — P9040: CPT

## 2022-10-05 RX ORDER — DIPHENHYDRAMINE HCL 50 MG
25 CAPSULE ORAL ONCE
Refills: 0 | Status: COMPLETED | OUTPATIENT
Start: 2022-10-05 | End: 2022-10-05

## 2022-10-05 RX ORDER — ACETAMINOPHEN 500 MG
650 TABLET ORAL ONCE
Refills: 0 | Status: COMPLETED | OUTPATIENT
Start: 2022-10-05 | End: 2022-10-05

## 2022-10-05 RX ADMIN — Medication 25 MILLIGRAM(S): at 07:44

## 2022-10-05 RX ADMIN — Medication 650 MILLIGRAM(S): at 07:44

## 2022-10-05 RX ADMIN — Medication 650 MILLIGRAM(S): at 08:30

## 2022-10-19 ENCOUNTER — OUTPATIENT (OUTPATIENT)
Dept: OUTPATIENT SERVICES | Facility: HOSPITAL | Age: 87
LOS: 1 days | Discharge: HOME | End: 2022-10-19
Payer: MEDICARE

## 2022-10-19 DIAGNOSIS — D64.9 ANEMIA, UNSPECIFIED: ICD-10-CM

## 2022-10-19 DIAGNOSIS — S72.90XA UNSPECIFIED FRACTURE OF UNSPECIFIED FEMUR, INITIAL ENCOUNTER FOR CLOSED FRACTURE: Chronic | ICD-10-CM

## 2022-10-19 DIAGNOSIS — Z98.890 OTHER SPECIFIED POSTPROCEDURAL STATES: Chronic | ICD-10-CM

## 2022-10-19 PROCEDURE — 86900 BLOOD TYPING SEROLOGIC ABO: CPT

## 2022-10-19 PROCEDURE — 36415 COLL VENOUS BLD VENIPUNCTURE: CPT

## 2022-10-19 PROCEDURE — 86850 RBC ANTIBODY SCREEN: CPT

## 2022-10-19 PROCEDURE — 86923 COMPATIBILITY TEST ELECTRIC: CPT

## 2022-10-19 PROCEDURE — 86901 BLOOD TYPING SEROLOGIC RH(D): CPT

## 2022-10-20 ENCOUNTER — OUTPATIENT (OUTPATIENT)
Dept: OUTPATIENT SERVICES | Facility: HOSPITAL | Age: 87
LOS: 1 days | Discharge: HOME | End: 2022-10-20
Payer: MEDICARE

## 2022-10-20 VITALS
SYSTOLIC BLOOD PRESSURE: 122 MMHG | RESPIRATION RATE: 18 BRPM | HEART RATE: 70 BPM | TEMPERATURE: 99 F | OXYGEN SATURATION: 99 % | DIASTOLIC BLOOD PRESSURE: 51 MMHG

## 2022-10-20 VITALS
RESPIRATION RATE: 18 BRPM | OXYGEN SATURATION: 97 % | TEMPERATURE: 99 F | HEART RATE: 74 BPM | SYSTOLIC BLOOD PRESSURE: 106 MMHG | DIASTOLIC BLOOD PRESSURE: 55 MMHG

## 2022-10-20 DIAGNOSIS — S72.90XA UNSPECIFIED FRACTURE OF UNSPECIFIED FEMUR, INITIAL ENCOUNTER FOR CLOSED FRACTURE: Chronic | ICD-10-CM

## 2022-10-20 DIAGNOSIS — D64.9 ANEMIA, UNSPECIFIED: ICD-10-CM

## 2022-10-20 DIAGNOSIS — Z98.890 OTHER SPECIFIED POSTPROCEDURAL STATES: Chronic | ICD-10-CM

## 2022-10-20 PROCEDURE — P9040: CPT

## 2022-10-20 PROCEDURE — 36430 TRANSFUSION BLD/BLD COMPNT: CPT

## 2022-10-20 RX ORDER — ACETAMINOPHEN 500 MG
650 TABLET ORAL ONCE
Refills: 0 | Status: COMPLETED | OUTPATIENT
Start: 2022-10-20 | End: 2022-10-20

## 2022-10-20 RX ORDER — DIPHENHYDRAMINE HCL 50 MG
25 CAPSULE ORAL ONCE
Refills: 0 | Status: COMPLETED | OUTPATIENT
Start: 2022-10-20 | End: 2022-10-20

## 2022-10-20 RX ADMIN — Medication 25 MILLIGRAM(S): at 08:46

## 2022-10-20 RX ADMIN — Medication 650 MILLIGRAM(S): at 08:47

## 2022-10-20 RX ADMIN — Medication 650 MILLIGRAM(S): at 09:30

## 2022-11-15 ENCOUNTER — OUTPATIENT (OUTPATIENT)
Dept: OUTPATIENT SERVICES | Facility: HOSPITAL | Age: 87
LOS: 1 days | Discharge: HOME | End: 2022-11-15
Payer: MEDICARE

## 2022-11-15 DIAGNOSIS — Z98.890 OTHER SPECIFIED POSTPROCEDURAL STATES: Chronic | ICD-10-CM

## 2022-11-15 DIAGNOSIS — D64.9 ANEMIA, UNSPECIFIED: ICD-10-CM

## 2022-11-15 DIAGNOSIS — S72.90XA UNSPECIFIED FRACTURE OF UNSPECIFIED FEMUR, INITIAL ENCOUNTER FOR CLOSED FRACTURE: Chronic | ICD-10-CM

## 2022-11-15 PROCEDURE — 86900 BLOOD TYPING SEROLOGIC ABO: CPT

## 2022-11-15 PROCEDURE — 86923 COMPATIBILITY TEST ELECTRIC: CPT

## 2022-11-15 PROCEDURE — 36415 COLL VENOUS BLD VENIPUNCTURE: CPT

## 2022-11-15 PROCEDURE — 86850 RBC ANTIBODY SCREEN: CPT

## 2022-11-15 PROCEDURE — 86901 BLOOD TYPING SEROLOGIC RH(D): CPT

## 2022-11-16 ENCOUNTER — OUTPATIENT (OUTPATIENT)
Dept: OUTPATIENT SERVICES | Facility: HOSPITAL | Age: 87
LOS: 1 days | Discharge: HOME | End: 2022-11-16
Payer: MEDICARE

## 2022-11-16 VITALS
RESPIRATION RATE: 18 BRPM | OXYGEN SATURATION: 99 % | TEMPERATURE: 99 F | HEART RATE: 70 BPM | SYSTOLIC BLOOD PRESSURE: 131 MMHG | DIASTOLIC BLOOD PRESSURE: 39 MMHG

## 2022-11-16 VITALS
SYSTOLIC BLOOD PRESSURE: 124 MMHG | HEART RATE: 77 BPM | DIASTOLIC BLOOD PRESSURE: 37 MMHG | TEMPERATURE: 98 F | OXYGEN SATURATION: 99 % | RESPIRATION RATE: 19 BRPM

## 2022-11-16 DIAGNOSIS — Z98.890 OTHER SPECIFIED POSTPROCEDURAL STATES: Chronic | ICD-10-CM

## 2022-11-16 DIAGNOSIS — D64.9 ANEMIA, UNSPECIFIED: ICD-10-CM

## 2022-11-16 DIAGNOSIS — S72.90XA UNSPECIFIED FRACTURE OF UNSPECIFIED FEMUR, INITIAL ENCOUNTER FOR CLOSED FRACTURE: Chronic | ICD-10-CM

## 2022-11-16 PROCEDURE — P9040: CPT

## 2022-11-16 PROCEDURE — P9016: CPT | Mod: XU

## 2022-11-16 PROCEDURE — 36430 TRANSFUSION BLD/BLD COMPNT: CPT

## 2022-11-16 RX ORDER — ACETAMINOPHEN 500 MG
650 TABLET ORAL ONCE
Refills: 0 | Status: COMPLETED | OUTPATIENT
Start: 2022-11-16 | End: 2022-11-16

## 2022-11-16 RX ORDER — DIPHENHYDRAMINE HCL 50 MG
25 CAPSULE ORAL ONCE
Refills: 0 | Status: COMPLETED | OUTPATIENT
Start: 2022-11-16 | End: 2022-11-16

## 2022-11-16 RX ADMIN — Medication 650 MILLIGRAM(S): at 08:11

## 2022-11-16 RX ADMIN — Medication 25 MILLIGRAM(S): at 08:11

## 2022-11-17 NOTE — H&P ADULT - NSTOBACCOSCREENHP_GEN_A_CS
Airway  Urgency: elective    Date/Time: 11/17/2022 8:14 AM  Airway not difficult    General Information and Staff    Patient location during procedure: OR  CRNA/CAA: Jovita Giraldo CRNA    Indications and Patient Condition  Indications for airway management: airway protection    Preoxygenated: yes  Mask difficulty assessment: 1 - vent by mask    Final Airway Details  Final airway type: endotracheal airway      Successful airway: ETT  Cuffed: yes   Successful intubation technique: direct laryngoscopy  Facilitating devices/methods: anterior pressure/BURP  Endotracheal tube insertion site: oral  Blade: Nasir  Blade size: 4  ETT size (mm): 8.0  Cormack-Lehane Classification: grade IIa - partial view of glottis  Placement verified by: chest auscultation and capnometry   Measured from: lips  ETT/EBT  to lips (cm): 23  Number of attempts at approach: 1  Assessment: lips, teeth, and gum same as pre-op and atraumatic intubation    Additional Comments   ett cuff up at MOP        
No

## 2022-12-07 ENCOUNTER — OUTPATIENT (OUTPATIENT)
Dept: OUTPATIENT SERVICES | Facility: HOSPITAL | Age: 87
LOS: 1 days | Discharge: HOME | End: 2022-12-07
Payer: MEDICARE

## 2022-12-07 DIAGNOSIS — D64.9 ANEMIA, UNSPECIFIED: ICD-10-CM

## 2022-12-07 DIAGNOSIS — Z98.890 OTHER SPECIFIED POSTPROCEDURAL STATES: Chronic | ICD-10-CM

## 2022-12-07 DIAGNOSIS — S72.90XA UNSPECIFIED FRACTURE OF UNSPECIFIED FEMUR, INITIAL ENCOUNTER FOR CLOSED FRACTURE: Chronic | ICD-10-CM

## 2022-12-07 PROCEDURE — 86923 COMPATIBILITY TEST ELECTRIC: CPT

## 2022-12-07 PROCEDURE — 86901 BLOOD TYPING SEROLOGIC RH(D): CPT

## 2022-12-07 PROCEDURE — 86900 BLOOD TYPING SEROLOGIC ABO: CPT

## 2022-12-07 PROCEDURE — 36415 COLL VENOUS BLD VENIPUNCTURE: CPT

## 2022-12-07 PROCEDURE — 86850 RBC ANTIBODY SCREEN: CPT

## 2022-12-08 NOTE — DISCHARGE NOTE NURSING/CASE MANAGEMENT/SOCIAL WORK - NSCORESITESY/N_GEN_A_CORE_RD
[facility] Progress Note      HD #2 today x 3 hours per Dr. Gutierrez.   Initiated at 1043 and ended at 1343.     Assessment:    Patient stable, not in distress. On room air, no SOB, no chest pain.     Access used: R chest catheter    Dressing soiled upon arrival. Dressing changed per protocol.       Net Fluid Removed: 3,000 mL    Procedure Events/ Complications:   Patient tolerated tx. No hypotensive episodes. Not in distress. Patient aware to call RN if in case the dressing is soiled.       Post Access Care:   Blood returned. Flushed with NS.  CVC locked with Heparin 1000 units/ mL   Arterial port:  1.8 mL   Venous port: 1.9 mL     Dressing change: yes, CDI,        Report given to Primary JOE Silva RN.    Yes

## 2022-12-09 ENCOUNTER — OUTPATIENT (OUTPATIENT)
Dept: OUTPATIENT SERVICES | Facility: HOSPITAL | Age: 87
LOS: 1 days | Discharge: HOME | End: 2022-12-09
Payer: MEDICARE

## 2022-12-09 VITALS
OXYGEN SATURATION: 100 % | HEART RATE: 82 BPM | TEMPERATURE: 97 F | RESPIRATION RATE: 18 BRPM | DIASTOLIC BLOOD PRESSURE: 54 MMHG | SYSTOLIC BLOOD PRESSURE: 156 MMHG

## 2022-12-09 VITALS
SYSTOLIC BLOOD PRESSURE: 111 MMHG | TEMPERATURE: 98 F | OXYGEN SATURATION: 100 % | DIASTOLIC BLOOD PRESSURE: 88 MMHG | HEART RATE: 74 BPM | RESPIRATION RATE: 18 BRPM

## 2022-12-09 DIAGNOSIS — D64.9 ANEMIA, UNSPECIFIED: ICD-10-CM

## 2022-12-09 DIAGNOSIS — Z98.890 OTHER SPECIFIED POSTPROCEDURAL STATES: Chronic | ICD-10-CM

## 2022-12-09 DIAGNOSIS — S72.90XA UNSPECIFIED FRACTURE OF UNSPECIFIED FEMUR, INITIAL ENCOUNTER FOR CLOSED FRACTURE: Chronic | ICD-10-CM

## 2022-12-09 PROCEDURE — P9016: CPT

## 2022-12-09 PROCEDURE — 36430 TRANSFUSION BLD/BLD COMPNT: CPT

## 2022-12-09 RX ORDER — ACETAMINOPHEN 500 MG
650 TABLET ORAL ONCE
Refills: 0 | Status: COMPLETED | OUTPATIENT
Start: 2022-12-09 | End: 2022-12-09

## 2022-12-09 RX ORDER — DIPHENHYDRAMINE HCL 50 MG
25 CAPSULE ORAL ONCE
Refills: 0 | Status: COMPLETED | OUTPATIENT
Start: 2022-12-09 | End: 2022-12-09

## 2022-12-09 RX ADMIN — Medication 650 MILLIGRAM(S): at 07:53

## 2022-12-09 RX ADMIN — Medication 25 MILLIGRAM(S): at 07:30

## 2022-12-09 RX ADMIN — Medication 650 MILLIGRAM(S): at 07:30

## 2022-12-30 ENCOUNTER — OUTPATIENT (OUTPATIENT)
Dept: OUTPATIENT SERVICES | Facility: HOSPITAL | Age: 87
LOS: 1 days | Discharge: HOME | End: 2022-12-30
Payer: MEDICARE

## 2022-12-30 DIAGNOSIS — Z98.890 OTHER SPECIFIED POSTPROCEDURAL STATES: Chronic | ICD-10-CM

## 2022-12-30 DIAGNOSIS — D64.9 ANEMIA, UNSPECIFIED: ICD-10-CM

## 2022-12-30 DIAGNOSIS — S72.90XA UNSPECIFIED FRACTURE OF UNSPECIFIED FEMUR, INITIAL ENCOUNTER FOR CLOSED FRACTURE: Chronic | ICD-10-CM

## 2022-12-30 LAB — BLD GP AB SCN SERPL QL: SIGNIFICANT CHANGE UP

## 2022-12-30 PROCEDURE — 86901 BLOOD TYPING SEROLOGIC RH(D): CPT

## 2022-12-30 PROCEDURE — 86900 BLOOD TYPING SEROLOGIC ABO: CPT

## 2022-12-30 PROCEDURE — 36415 COLL VENOUS BLD VENIPUNCTURE: CPT

## 2022-12-30 PROCEDURE — 86923 COMPATIBILITY TEST ELECTRIC: CPT

## 2022-12-30 PROCEDURE — 86850 RBC ANTIBODY SCREEN: CPT

## 2023-01-02 ENCOUNTER — OUTPATIENT (OUTPATIENT)
Dept: OUTPATIENT SERVICES | Facility: HOSPITAL | Age: 88
LOS: 1 days | Discharge: HOME | End: 2023-01-02
Payer: MEDICARE

## 2023-01-02 VITALS
RESPIRATION RATE: 18 BRPM | SYSTOLIC BLOOD PRESSURE: 154 MMHG | OXYGEN SATURATION: 100 % | DIASTOLIC BLOOD PRESSURE: 59 MMHG | TEMPERATURE: 98 F | HEART RATE: 76 BPM

## 2023-01-02 VITALS
DIASTOLIC BLOOD PRESSURE: 75 MMHG | SYSTOLIC BLOOD PRESSURE: 119 MMHG | OXYGEN SATURATION: 100 % | TEMPERATURE: 98 F | HEART RATE: 74 BPM | RESPIRATION RATE: 18 BRPM

## 2023-01-02 DIAGNOSIS — Z98.890 OTHER SPECIFIED POSTPROCEDURAL STATES: Chronic | ICD-10-CM

## 2023-01-02 DIAGNOSIS — D64.9 ANEMIA, UNSPECIFIED: ICD-10-CM

## 2023-01-02 DIAGNOSIS — S72.90XA UNSPECIFIED FRACTURE OF UNSPECIFIED FEMUR, INITIAL ENCOUNTER FOR CLOSED FRACTURE: Chronic | ICD-10-CM

## 2023-01-02 PROCEDURE — P9040: CPT

## 2023-01-02 PROCEDURE — 36430 TRANSFUSION BLD/BLD COMPNT: CPT

## 2023-01-02 RX ORDER — DIPHENHYDRAMINE HCL 50 MG
25 CAPSULE ORAL ONCE
Refills: 0 | Status: COMPLETED | OUTPATIENT
Start: 2023-01-02 | End: 2023-01-02

## 2023-01-02 RX ORDER — ACETAMINOPHEN 500 MG
650 TABLET ORAL ONCE
Refills: 0 | Status: COMPLETED | OUTPATIENT
Start: 2023-01-02 | End: 2023-01-02

## 2023-01-02 RX ADMIN — Medication 650 MILLIGRAM(S): at 07:41

## 2023-01-02 RX ADMIN — Medication 650 MILLIGRAM(S): at 08:40

## 2023-01-02 RX ADMIN — Medication 25 MILLIGRAM(S): at 07:41

## 2023-01-25 ENCOUNTER — OUTPATIENT (OUTPATIENT)
Dept: OUTPATIENT SERVICES | Facility: HOSPITAL | Age: 88
LOS: 1 days | End: 2023-01-25
Payer: MEDICARE

## 2023-01-25 VITALS
DIASTOLIC BLOOD PRESSURE: 34 MMHG | TEMPERATURE: 99 F | RESPIRATION RATE: 17 BRPM | SYSTOLIC BLOOD PRESSURE: 115 MMHG | OXYGEN SATURATION: 98 % | HEART RATE: 72 BPM

## 2023-01-25 VITALS
HEART RATE: 78 BPM | RESPIRATION RATE: 19 BRPM | TEMPERATURE: 99 F | DIASTOLIC BLOOD PRESSURE: 37 MMHG | SYSTOLIC BLOOD PRESSURE: 123 MMHG | OXYGEN SATURATION: 99 %

## 2023-01-25 DIAGNOSIS — S72.90XA UNSPECIFIED FRACTURE OF UNSPECIFIED FEMUR, INITIAL ENCOUNTER FOR CLOSED FRACTURE: Chronic | ICD-10-CM

## 2023-01-25 DIAGNOSIS — D64.9 ANEMIA, UNSPECIFIED: ICD-10-CM

## 2023-01-25 DIAGNOSIS — Z98.890 OTHER SPECIFIED POSTPROCEDURAL STATES: Chronic | ICD-10-CM

## 2023-01-25 LAB — BLD GP AB SCN SERPL QL: SIGNIFICANT CHANGE UP

## 2023-01-25 PROCEDURE — 36415 COLL VENOUS BLD VENIPUNCTURE: CPT

## 2023-01-25 PROCEDURE — P9016: CPT

## 2023-01-25 PROCEDURE — 86923 COMPATIBILITY TEST ELECTRIC: CPT

## 2023-01-25 PROCEDURE — 86850 RBC ANTIBODY SCREEN: CPT

## 2023-01-25 PROCEDURE — 86901 BLOOD TYPING SEROLOGIC RH(D): CPT

## 2023-01-25 PROCEDURE — 86900 BLOOD TYPING SEROLOGIC ABO: CPT

## 2023-01-25 PROCEDURE — 36430 TRANSFUSION BLD/BLD COMPNT: CPT

## 2023-01-25 RX ORDER — ACETAMINOPHEN 500 MG
650 TABLET ORAL ONCE
Refills: 0 | Status: COMPLETED | OUTPATIENT
Start: 2023-01-25 | End: 2023-01-25

## 2023-01-25 RX ORDER — DIPHENHYDRAMINE HCL 50 MG
25 CAPSULE ORAL ONCE
Refills: 0 | Status: COMPLETED | OUTPATIENT
Start: 2023-01-25 | End: 2023-01-25

## 2023-01-25 RX ADMIN — Medication 650 MILLIGRAM(S): at 08:52

## 2023-01-25 RX ADMIN — Medication 25 MILLIGRAM(S): at 07:52

## 2023-01-25 RX ADMIN — Medication 650 MILLIGRAM(S): at 07:52

## 2023-01-26 DIAGNOSIS — D64.9 ANEMIA, UNSPECIFIED: ICD-10-CM

## 2023-02-09 ENCOUNTER — OUTPATIENT (OUTPATIENT)
Dept: OUTPATIENT SERVICES | Facility: HOSPITAL | Age: 88
LOS: 1 days | Discharge: HOME | End: 2023-02-09
Payer: MEDICARE

## 2023-02-09 DIAGNOSIS — Z98.890 OTHER SPECIFIED POSTPROCEDURAL STATES: Chronic | ICD-10-CM

## 2023-02-09 DIAGNOSIS — D64.9 ANEMIA, UNSPECIFIED: ICD-10-CM

## 2023-02-09 DIAGNOSIS — S72.90XA UNSPECIFIED FRACTURE OF UNSPECIFIED FEMUR, INITIAL ENCOUNTER FOR CLOSED FRACTURE: Chronic | ICD-10-CM

## 2023-02-09 LAB — BLD GP AB SCN SERPL QL: SIGNIFICANT CHANGE UP

## 2023-02-09 PROCEDURE — 86850 RBC ANTIBODY SCREEN: CPT

## 2023-02-09 PROCEDURE — 86923 COMPATIBILITY TEST ELECTRIC: CPT

## 2023-02-09 PROCEDURE — 86901 BLOOD TYPING SEROLOGIC RH(D): CPT

## 2023-02-09 PROCEDURE — 36415 COLL VENOUS BLD VENIPUNCTURE: CPT

## 2023-02-09 PROCEDURE — 86900 BLOOD TYPING SEROLOGIC ABO: CPT

## 2023-02-10 ENCOUNTER — OUTPATIENT (OUTPATIENT)
Dept: OUTPATIENT SERVICES | Facility: HOSPITAL | Age: 88
LOS: 1 days | Discharge: HOME | End: 2023-02-10
Payer: MEDICARE

## 2023-02-10 VITALS
TEMPERATURE: 98 F | OXYGEN SATURATION: 100 % | SYSTOLIC BLOOD PRESSURE: 133 MMHG | DIASTOLIC BLOOD PRESSURE: 50 MMHG | HEART RATE: 89 BPM | RESPIRATION RATE: 18 BRPM

## 2023-02-10 VITALS
SYSTOLIC BLOOD PRESSURE: 143 MMHG | TEMPERATURE: 99 F | RESPIRATION RATE: 18 BRPM | OXYGEN SATURATION: 98 % | HEART RATE: 72 BPM | DIASTOLIC BLOOD PRESSURE: 49 MMHG

## 2023-02-10 DIAGNOSIS — S72.90XA UNSPECIFIED FRACTURE OF UNSPECIFIED FEMUR, INITIAL ENCOUNTER FOR CLOSED FRACTURE: Chronic | ICD-10-CM

## 2023-02-10 DIAGNOSIS — Z98.890 OTHER SPECIFIED POSTPROCEDURAL STATES: Chronic | ICD-10-CM

## 2023-02-10 DIAGNOSIS — D64.9 ANEMIA, UNSPECIFIED: ICD-10-CM

## 2023-02-10 PROCEDURE — P9040: CPT

## 2023-02-10 PROCEDURE — 36430 TRANSFUSION BLD/BLD COMPNT: CPT

## 2023-02-10 RX ORDER — ACETAMINOPHEN 500 MG
650 TABLET ORAL ONCE
Refills: 0 | Status: COMPLETED | OUTPATIENT
Start: 2023-02-10 | End: 2023-02-10

## 2023-02-10 RX ORDER — DIPHENHYDRAMINE HCL 50 MG
25 CAPSULE ORAL ONCE
Refills: 0 | Status: COMPLETED | OUTPATIENT
Start: 2023-02-10 | End: 2023-02-10

## 2023-02-10 RX ADMIN — Medication 25 MILLIGRAM(S): at 07:47

## 2023-02-10 RX ADMIN — Medication 650 MILLIGRAM(S): at 07:47

## 2023-02-10 RX ADMIN — Medication 650 MILLIGRAM(S): at 08:57

## 2023-02-28 ENCOUNTER — OUTPATIENT (OUTPATIENT)
Dept: OUTPATIENT SERVICES | Facility: HOSPITAL | Age: 88
LOS: 1 days | Discharge: HOME | End: 2023-02-28
Payer: MEDICARE

## 2023-02-28 DIAGNOSIS — S72.90XA UNSPECIFIED FRACTURE OF UNSPECIFIED FEMUR, INITIAL ENCOUNTER FOR CLOSED FRACTURE: Chronic | ICD-10-CM

## 2023-02-28 DIAGNOSIS — D64.9 ANEMIA, UNSPECIFIED: ICD-10-CM

## 2023-02-28 DIAGNOSIS — Z98.890 OTHER SPECIFIED POSTPROCEDURAL STATES: Chronic | ICD-10-CM

## 2023-02-28 LAB — BLD GP AB SCN SERPL QL: SIGNIFICANT CHANGE UP

## 2023-02-28 PROCEDURE — 86923 COMPATIBILITY TEST ELECTRIC: CPT

## 2023-02-28 PROCEDURE — 86900 BLOOD TYPING SEROLOGIC ABO: CPT

## 2023-02-28 PROCEDURE — 36415 COLL VENOUS BLD VENIPUNCTURE: CPT

## 2023-02-28 PROCEDURE — 86901 BLOOD TYPING SEROLOGIC RH(D): CPT

## 2023-02-28 PROCEDURE — 86850 RBC ANTIBODY SCREEN: CPT

## 2023-03-01 ENCOUNTER — OUTPATIENT (OUTPATIENT)
Dept: OUTPATIENT SERVICES | Facility: HOSPITAL | Age: 88
LOS: 1 days | Discharge: HOME | End: 2023-03-01
Payer: MEDICARE

## 2023-03-01 VITALS
OXYGEN SATURATION: 98 % | TEMPERATURE: 98 F | HEART RATE: 80 BPM | DIASTOLIC BLOOD PRESSURE: 50 MMHG | SYSTOLIC BLOOD PRESSURE: 128 MMHG | RESPIRATION RATE: 18 BRPM

## 2023-03-01 VITALS
HEART RATE: 73 BPM | OXYGEN SATURATION: 98 % | RESPIRATION RATE: 18 BRPM | SYSTOLIC BLOOD PRESSURE: 127 MMHG | TEMPERATURE: 99 F | DIASTOLIC BLOOD PRESSURE: 51 MMHG

## 2023-03-01 DIAGNOSIS — D64.9 ANEMIA, UNSPECIFIED: ICD-10-CM

## 2023-03-01 DIAGNOSIS — S72.90XA UNSPECIFIED FRACTURE OF UNSPECIFIED FEMUR, INITIAL ENCOUNTER FOR CLOSED FRACTURE: Chronic | ICD-10-CM

## 2023-03-01 DIAGNOSIS — Z98.890 OTHER SPECIFIED POSTPROCEDURAL STATES: Chronic | ICD-10-CM

## 2023-03-01 PROCEDURE — P9040: CPT

## 2023-03-01 PROCEDURE — 36430 TRANSFUSION BLD/BLD COMPNT: CPT

## 2023-03-01 PROCEDURE — P9016: CPT

## 2023-03-01 RX ORDER — ACETAMINOPHEN 500 MG
650 TABLET ORAL ONCE
Refills: 0 | Status: COMPLETED | OUTPATIENT
Start: 2023-03-01 | End: 2023-03-01

## 2023-03-01 RX ORDER — DIPHENHYDRAMINE HCL 50 MG
25 CAPSULE ORAL ONCE
Refills: 0 | Status: COMPLETED | OUTPATIENT
Start: 2023-03-01 | End: 2023-03-01

## 2023-03-01 RX ADMIN — Medication 25 MILLIGRAM(S): at 07:13

## 2023-03-01 RX ADMIN — Medication 650 MILLIGRAM(S): at 07:13

## 2023-03-01 RX ADMIN — Medication 650 MILLIGRAM(S): at 07:15

## 2023-03-17 ENCOUNTER — OUTPATIENT (OUTPATIENT)
Dept: OUTPATIENT SERVICES | Facility: HOSPITAL | Age: 88
LOS: 1 days | Discharge: HOME | End: 2023-03-17
Payer: MEDICARE

## 2023-03-17 DIAGNOSIS — D64.9 ANEMIA, UNSPECIFIED: ICD-10-CM

## 2023-03-17 DIAGNOSIS — Z98.890 OTHER SPECIFIED POSTPROCEDURAL STATES: Chronic | ICD-10-CM

## 2023-03-17 DIAGNOSIS — S72.90XA UNSPECIFIED FRACTURE OF UNSPECIFIED FEMUR, INITIAL ENCOUNTER FOR CLOSED FRACTURE: Chronic | ICD-10-CM

## 2023-03-17 LAB
BLD GP AB SCN SERPL QL: SIGNIFICANT CHANGE UP
HCT VFR BLD CALC: 23.5 % — LOW (ref 34.5–45)
HGB BLD-MCNC: 7.8 G/DL — LOW (ref 11.5–15.5)
MCHC RBC-ENTMCNC: 29.9 PG — SIGNIFICANT CHANGE UP (ref 27–34)
MCHC RBC-ENTMCNC: 33.2 GM/DL — SIGNIFICANT CHANGE UP (ref 32–36)
MCV RBC AUTO: 90 FL — SIGNIFICANT CHANGE UP (ref 80–100)
PLATELET # BLD AUTO: 100 K/UL — LOW (ref 150–400)
RBC # BLD: 2.61 M/UL — LOW (ref 3.8–5.2)
RBC # FLD: 14 % — SIGNIFICANT CHANGE UP (ref 10.3–14.5)
WBC # BLD: 3.36 K/UL — LOW (ref 3.8–10.5)
WBC # FLD AUTO: 3.36 K/UL — LOW (ref 3.8–10.5)

## 2023-03-17 PROCEDURE — 86900 BLOOD TYPING SEROLOGIC ABO: CPT

## 2023-03-17 PROCEDURE — 86901 BLOOD TYPING SEROLOGIC RH(D): CPT

## 2023-03-17 PROCEDURE — 86923 COMPATIBILITY TEST ELECTRIC: CPT

## 2023-03-17 PROCEDURE — 85027 COMPLETE CBC AUTOMATED: CPT

## 2023-03-17 PROCEDURE — 36415 COLL VENOUS BLD VENIPUNCTURE: CPT

## 2023-03-17 PROCEDURE — 86850 RBC ANTIBODY SCREEN: CPT

## 2023-03-20 ENCOUNTER — OUTPATIENT (OUTPATIENT)
Dept: OUTPATIENT SERVICES | Facility: HOSPITAL | Age: 88
LOS: 1 days | Discharge: HOME | End: 2023-03-20
Payer: MEDICARE

## 2023-03-20 VITALS
TEMPERATURE: 98 F | OXYGEN SATURATION: 100 % | HEART RATE: 76 BPM | DIASTOLIC BLOOD PRESSURE: 60 MMHG | SYSTOLIC BLOOD PRESSURE: 138 MMHG | RESPIRATION RATE: 19 BRPM

## 2023-03-20 VITALS
OXYGEN SATURATION: 100 % | HEART RATE: 76 BPM | DIASTOLIC BLOOD PRESSURE: 54 MMHG | RESPIRATION RATE: 19 BRPM | TEMPERATURE: 97 F | SYSTOLIC BLOOD PRESSURE: 126 MMHG

## 2023-03-20 DIAGNOSIS — D64.9 ANEMIA, UNSPECIFIED: ICD-10-CM

## 2023-03-20 DIAGNOSIS — S72.90XA UNSPECIFIED FRACTURE OF UNSPECIFIED FEMUR, INITIAL ENCOUNTER FOR CLOSED FRACTURE: Chronic | ICD-10-CM

## 2023-03-20 DIAGNOSIS — Z98.890 OTHER SPECIFIED POSTPROCEDURAL STATES: Chronic | ICD-10-CM

## 2023-03-20 LAB
ADD ON TEST-SPECIMEN IN LAB: SIGNIFICANT CHANGE UP
ANISOCYTOSIS BLD QL: SLIGHT — SIGNIFICANT CHANGE UP
BASOPHILS # BLD AUTO: 0 K/UL — SIGNIFICANT CHANGE UP (ref 0–0.2)
BASOPHILS NFR BLD AUTO: 0 % — SIGNIFICANT CHANGE UP (ref 0–2)
EOSINOPHIL # BLD AUTO: 0.02 K/UL — SIGNIFICANT CHANGE UP (ref 0–0.5)
EOSINOPHIL NFR BLD AUTO: 1 % — SIGNIFICANT CHANGE UP (ref 0–6)
HCT VFR BLD CALC: 20.8 % — CRITICAL LOW (ref 34.5–45)
HGB BLD-MCNC: 6.9 G/DL — CRITICAL LOW (ref 11.5–15.5)
LYMPHOCYTES # BLD AUTO: 0.77 K/UL — LOW (ref 1–3.3)
LYMPHOCYTES # BLD AUTO: 48 % — HIGH (ref 13–44)
MACROCYTES BLD QL: SLIGHT — SIGNIFICANT CHANGE UP
MANUAL SMEAR VERIFICATION: SIGNIFICANT CHANGE UP
MCHC RBC-ENTMCNC: 29.7 PG — SIGNIFICANT CHANGE UP (ref 27–34)
MCHC RBC-ENTMCNC: 33.2 GM/DL — SIGNIFICANT CHANGE UP (ref 32–36)
MCV RBC AUTO: 89.7 FL — SIGNIFICANT CHANGE UP (ref 80–100)
MONOCYTES # BLD AUTO: 0.06 K/UL — SIGNIFICANT CHANGE UP (ref 0–0.9)
MONOCYTES NFR BLD AUTO: 4 % — SIGNIFICANT CHANGE UP (ref 2–14)
NEUTROPHILS # BLD AUTO: 0.75 K/UL — LOW (ref 1.8–7.4)
NEUTROPHILS NFR BLD AUTO: 47 % — SIGNIFICANT CHANGE UP (ref 43–77)
NRBC # BLD: 0 /100 — SIGNIFICANT CHANGE UP (ref 0–0)
NRBC # BLD: SIGNIFICANT CHANGE UP /100 WBCS (ref 0–0)
OVALOCYTES BLD QL SMEAR: SLIGHT — SIGNIFICANT CHANGE UP
PLAT MORPH BLD: ABNORMAL
PLATELET # BLD AUTO: 100 K/UL — LOW (ref 150–400)
PLATELET COUNT - ESTIMATE: ABNORMAL
POIKILOCYTOSIS BLD QL AUTO: SLIGHT — SIGNIFICANT CHANGE UP
RBC # BLD: 2.32 M/UL — LOW (ref 3.8–5.2)
RBC # FLD: 14 % — SIGNIFICANT CHANGE UP (ref 10.3–14.5)
RBC BLD AUTO: ABNORMAL
WBC # BLD: 1.6 K/UL — LOW (ref 3.8–10.5)
WBC # FLD AUTO: 1.6 K/UL — LOW (ref 3.8–10.5)

## 2023-03-20 PROCEDURE — 85027 COMPLETE CBC AUTOMATED: CPT

## 2023-03-20 PROCEDURE — 96372 THER/PROPH/DIAG INJ SC/IM: CPT

## 2023-03-20 PROCEDURE — P9016: CPT

## 2023-03-20 PROCEDURE — 36415 COLL VENOUS BLD VENIPUNCTURE: CPT

## 2023-03-20 PROCEDURE — 36430 TRANSFUSION BLD/BLD COMPNT: CPT

## 2023-03-20 RX ORDER — DIPHENHYDRAMINE HCL 50 MG
25 CAPSULE ORAL ONCE
Refills: 0 | Status: COMPLETED | OUTPATIENT
Start: 2023-03-20 | End: 2023-03-20

## 2023-03-20 RX ORDER — FILGRASTIM 480MCG/1.6
300 VIAL (ML) INJECTION ONCE
Refills: 0 | Status: COMPLETED | OUTPATIENT
Start: 2023-03-20 | End: 2023-03-20

## 2023-03-20 RX ORDER — ACETAMINOPHEN 500 MG
650 TABLET ORAL ONCE
Refills: 0 | Status: COMPLETED | OUTPATIENT
Start: 2023-03-20 | End: 2023-03-20

## 2023-03-20 RX ADMIN — Medication 25 MILLIGRAM(S): at 07:35

## 2023-03-20 RX ADMIN — Medication 650 MILLIGRAM(S): at 07:35

## 2023-03-20 RX ADMIN — Medication 650 MILLIGRAM(S): at 07:54

## 2023-03-20 RX ADMIN — Medication 300 MICROGRAM(S): at 13:10

## 2023-03-20 NOTE — PROVIDER CONTACT NOTE (CRITICAL VALUE NOTIFICATION) - SITUATION
pt in infusion room to receive two units PRBC. stat cbc drawn per MD to assess ANC and determine if dose of Zarxio should be given in addition to PRBCs. Spoke with Shawna CORDOVA from Dr Leone office and gave  results of CBC with Diff. Order rec'd for Zarxio dose to be given today in addition to two units PRBCs already in progress.

## 2023-04-13 ENCOUNTER — OUTPATIENT (OUTPATIENT)
Dept: OUTPATIENT SERVICES | Facility: HOSPITAL | Age: 88
LOS: 1 days | Discharge: HOME | End: 2023-04-13
Payer: MEDICARE

## 2023-04-13 DIAGNOSIS — D64.9 ANEMIA, UNSPECIFIED: ICD-10-CM

## 2023-04-13 DIAGNOSIS — Z98.890 OTHER SPECIFIED POSTPROCEDURAL STATES: Chronic | ICD-10-CM

## 2023-04-13 DIAGNOSIS — S72.90XA UNSPECIFIED FRACTURE OF UNSPECIFIED FEMUR, INITIAL ENCOUNTER FOR CLOSED FRACTURE: Chronic | ICD-10-CM

## 2023-04-13 LAB — BLD GP AB SCN SERPL QL: SIGNIFICANT CHANGE UP

## 2023-04-13 PROCEDURE — 36415 COLL VENOUS BLD VENIPUNCTURE: CPT

## 2023-04-13 PROCEDURE — 86900 BLOOD TYPING SEROLOGIC ABO: CPT

## 2023-04-13 PROCEDURE — 86923 COMPATIBILITY TEST ELECTRIC: CPT

## 2023-04-13 PROCEDURE — 86850 RBC ANTIBODY SCREEN: CPT

## 2023-04-13 PROCEDURE — 86901 BLOOD TYPING SEROLOGIC RH(D): CPT

## 2023-04-14 ENCOUNTER — OUTPATIENT (OUTPATIENT)
Dept: OUTPATIENT SERVICES | Facility: HOSPITAL | Age: 88
LOS: 1 days | Discharge: HOME | End: 2023-04-14
Payer: MEDICARE

## 2023-04-14 VITALS
DIASTOLIC BLOOD PRESSURE: 54 MMHG | SYSTOLIC BLOOD PRESSURE: 121 MMHG | OXYGEN SATURATION: 100 % | RESPIRATION RATE: 18 BRPM | TEMPERATURE: 98 F | HEART RATE: 75 BPM

## 2023-04-14 VITALS
TEMPERATURE: 98 F | RESPIRATION RATE: 17 BRPM | SYSTOLIC BLOOD PRESSURE: 143 MMHG | DIASTOLIC BLOOD PRESSURE: 59 MMHG | OXYGEN SATURATION: 100 % | HEART RATE: 65 BPM

## 2023-04-14 DIAGNOSIS — D64.9 ANEMIA, UNSPECIFIED: ICD-10-CM

## 2023-04-14 DIAGNOSIS — S72.90XA UNSPECIFIED FRACTURE OF UNSPECIFIED FEMUR, INITIAL ENCOUNTER FOR CLOSED FRACTURE: Chronic | ICD-10-CM

## 2023-04-14 DIAGNOSIS — Z98.890 OTHER SPECIFIED POSTPROCEDURAL STATES: Chronic | ICD-10-CM

## 2023-04-14 PROCEDURE — 36430 TRANSFUSION BLD/BLD COMPNT: CPT

## 2023-04-14 PROCEDURE — P9040: CPT

## 2023-04-14 RX ORDER — DIPHENHYDRAMINE HCL 50 MG
25 CAPSULE ORAL ONCE
Refills: 0 | Status: COMPLETED | OUTPATIENT
Start: 2023-04-14 | End: 2023-04-14

## 2023-04-14 RX ORDER — ACETAMINOPHEN 500 MG
650 TABLET ORAL ONCE
Refills: 0 | Status: COMPLETED | OUTPATIENT
Start: 2023-04-14 | End: 2023-04-14

## 2023-04-14 RX ADMIN — Medication 650 MILLIGRAM(S): at 07:14

## 2023-04-14 RX ADMIN — Medication 650 MILLIGRAM(S): at 07:42

## 2023-04-14 RX ADMIN — Medication 25 MILLIGRAM(S): at 07:14

## 2023-05-05 ENCOUNTER — OUTPATIENT (OUTPATIENT)
Dept: OUTPATIENT SERVICES | Facility: HOSPITAL | Age: 88
LOS: 1 days | Discharge: HOME | End: 2023-05-05
Payer: MEDICARE

## 2023-05-05 DIAGNOSIS — S72.90XA UNSPECIFIED FRACTURE OF UNSPECIFIED FEMUR, INITIAL ENCOUNTER FOR CLOSED FRACTURE: Chronic | ICD-10-CM

## 2023-05-05 DIAGNOSIS — D64.9 ANEMIA, UNSPECIFIED: ICD-10-CM

## 2023-05-05 DIAGNOSIS — Z98.890 OTHER SPECIFIED POSTPROCEDURAL STATES: Chronic | ICD-10-CM

## 2023-05-05 LAB — BLD GP AB SCN SERPL QL: SIGNIFICANT CHANGE UP

## 2023-05-05 PROCEDURE — 86900 BLOOD TYPING SEROLOGIC ABO: CPT

## 2023-05-05 PROCEDURE — 86923 COMPATIBILITY TEST ELECTRIC: CPT

## 2023-05-05 PROCEDURE — 86901 BLOOD TYPING SEROLOGIC RH(D): CPT

## 2023-05-05 PROCEDURE — 36415 COLL VENOUS BLD VENIPUNCTURE: CPT

## 2023-05-05 PROCEDURE — 86850 RBC ANTIBODY SCREEN: CPT

## 2023-05-06 DIAGNOSIS — D64.9 ANEMIA, UNSPECIFIED: ICD-10-CM

## 2023-05-08 ENCOUNTER — OUTPATIENT (OUTPATIENT)
Dept: OUTPATIENT SERVICES | Facility: HOSPITAL | Age: 88
LOS: 1 days | Discharge: HOME | End: 2023-05-08
Payer: MEDICARE

## 2023-05-08 VITALS
TEMPERATURE: 98 F | HEART RATE: 72 BPM | RESPIRATION RATE: 18 BRPM | OXYGEN SATURATION: 100 % | SYSTOLIC BLOOD PRESSURE: 125 MMHG | DIASTOLIC BLOOD PRESSURE: 56 MMHG

## 2023-05-08 VITALS
RESPIRATION RATE: 18 BRPM | TEMPERATURE: 99 F | HEART RATE: 71 BPM | SYSTOLIC BLOOD PRESSURE: 143 MMHG | OXYGEN SATURATION: 99 % | DIASTOLIC BLOOD PRESSURE: 57 MMHG

## 2023-05-08 DIAGNOSIS — S72.90XA UNSPECIFIED FRACTURE OF UNSPECIFIED FEMUR, INITIAL ENCOUNTER FOR CLOSED FRACTURE: Chronic | ICD-10-CM

## 2023-05-08 DIAGNOSIS — Z98.890 OTHER SPECIFIED POSTPROCEDURAL STATES: Chronic | ICD-10-CM

## 2023-05-08 DIAGNOSIS — D64.9 ANEMIA, UNSPECIFIED: ICD-10-CM

## 2023-05-08 PROCEDURE — 36430 TRANSFUSION BLD/BLD COMPNT: CPT

## 2023-05-08 PROCEDURE — P9016: CPT

## 2023-05-08 RX ORDER — ACETAMINOPHEN 500 MG
650 TABLET ORAL ONCE
Refills: 0 | Status: COMPLETED | OUTPATIENT
Start: 2023-05-08 | End: 2023-05-08

## 2023-05-08 RX ORDER — DIPHENHYDRAMINE HCL 50 MG
25 CAPSULE ORAL ONCE
Refills: 0 | Status: COMPLETED | OUTPATIENT
Start: 2023-05-08 | End: 2023-05-08

## 2023-05-08 RX ADMIN — Medication 650 MILLIGRAM(S): at 07:40

## 2023-05-08 RX ADMIN — Medication 25 MILLIGRAM(S): at 07:07

## 2023-05-08 RX ADMIN — Medication 650 MILLIGRAM(S): at 07:06

## 2023-05-19 ENCOUNTER — OUTPATIENT (OUTPATIENT)
Dept: OUTPATIENT SERVICES | Facility: HOSPITAL | Age: 88
LOS: 1 days | Discharge: HOME | End: 2023-05-19
Payer: MEDICARE

## 2023-05-19 DIAGNOSIS — D64.9 ANEMIA, UNSPECIFIED: ICD-10-CM

## 2023-05-19 LAB — BLD GP AB SCN SERPL QL: SIGNIFICANT CHANGE UP

## 2023-05-19 PROCEDURE — 36415 COLL VENOUS BLD VENIPUNCTURE: CPT

## 2023-05-19 PROCEDURE — 86923 COMPATIBILITY TEST ELECTRIC: CPT

## 2023-05-19 PROCEDURE — 86900 BLOOD TYPING SEROLOGIC ABO: CPT

## 2023-05-19 PROCEDURE — 86850 RBC ANTIBODY SCREEN: CPT

## 2023-05-19 PROCEDURE — 86901 BLOOD TYPING SEROLOGIC RH(D): CPT

## 2023-05-22 ENCOUNTER — OUTPATIENT (OUTPATIENT)
Dept: OUTPATIENT SERVICES | Facility: HOSPITAL | Age: 88
LOS: 1 days | Discharge: HOME | End: 2023-05-22
Payer: MEDICARE

## 2023-05-22 VITALS
RESPIRATION RATE: 19 BRPM | OXYGEN SATURATION: 100 % | DIASTOLIC BLOOD PRESSURE: 47 MMHG | TEMPERATURE: 97 F | SYSTOLIC BLOOD PRESSURE: 136 MMHG | HEART RATE: 79 BPM

## 2023-05-22 VITALS
DIASTOLIC BLOOD PRESSURE: 44 MMHG | TEMPERATURE: 99 F | HEART RATE: 67 BPM | SYSTOLIC BLOOD PRESSURE: 132 MMHG | OXYGEN SATURATION: 100 % | RESPIRATION RATE: 18 BRPM

## 2023-05-22 DIAGNOSIS — Z98.890 OTHER SPECIFIED POSTPROCEDURAL STATES: Chronic | ICD-10-CM

## 2023-05-22 DIAGNOSIS — D64.9 ANEMIA, UNSPECIFIED: ICD-10-CM

## 2023-05-22 DIAGNOSIS — S72.90XA UNSPECIFIED FRACTURE OF UNSPECIFIED FEMUR, INITIAL ENCOUNTER FOR CLOSED FRACTURE: Chronic | ICD-10-CM

## 2023-05-22 PROCEDURE — 36430 TRANSFUSION BLD/BLD COMPNT: CPT

## 2023-05-22 PROCEDURE — P9016: CPT

## 2023-05-22 RX ORDER — DIPHENHYDRAMINE HCL 50 MG
25 CAPSULE ORAL ONCE
Refills: 0 | Status: COMPLETED | OUTPATIENT
Start: 2023-05-22 | End: 2023-05-22

## 2023-05-22 RX ORDER — ACETAMINOPHEN 500 MG
650 TABLET ORAL ONCE
Refills: 0 | Status: COMPLETED | OUTPATIENT
Start: 2023-05-22 | End: 2023-05-22

## 2023-05-22 RX ADMIN — Medication 650 MILLIGRAM(S): at 07:58

## 2023-05-22 RX ADMIN — Medication 650 MILLIGRAM(S): at 08:29

## 2023-05-22 RX ADMIN — Medication 25 MILLIGRAM(S): at 07:58

## 2023-05-22 NOTE — INFUSION NURSING HISTORY - RN HISTORY HPI
HX of breast cancer. pt now with MDS receiving Vidaza Q4 weeks. Requiring blood transfusion for anemia

## 2023-06-12 ENCOUNTER — OUTPATIENT (OUTPATIENT)
Dept: OUTPATIENT SERVICES | Facility: HOSPITAL | Age: 88
LOS: 1 days | Discharge: HOME | End: 2023-06-12
Payer: MEDICARE

## 2023-06-12 DIAGNOSIS — S72.90XA UNSPECIFIED FRACTURE OF UNSPECIFIED FEMUR, INITIAL ENCOUNTER FOR CLOSED FRACTURE: Chronic | ICD-10-CM

## 2023-06-12 DIAGNOSIS — D64.9 ANEMIA, UNSPECIFIED: ICD-10-CM

## 2023-06-12 DIAGNOSIS — Z98.890 OTHER SPECIFIED POSTPROCEDURAL STATES: Chronic | ICD-10-CM

## 2023-06-12 LAB — BLD GP AB SCN SERPL QL: SIGNIFICANT CHANGE UP

## 2023-06-12 PROCEDURE — 36415 COLL VENOUS BLD VENIPUNCTURE: CPT

## 2023-06-12 PROCEDURE — 86850 RBC ANTIBODY SCREEN: CPT

## 2023-06-12 PROCEDURE — 86923 COMPATIBILITY TEST ELECTRIC: CPT

## 2023-06-12 PROCEDURE — 86901 BLOOD TYPING SEROLOGIC RH(D): CPT

## 2023-06-12 PROCEDURE — 86900 BLOOD TYPING SEROLOGIC ABO: CPT

## 2023-06-13 ENCOUNTER — OUTPATIENT (OUTPATIENT)
Dept: OUTPATIENT SERVICES | Facility: HOSPITAL | Age: 88
LOS: 1 days | Discharge: HOME | End: 2023-06-13
Payer: MEDICARE

## 2023-06-13 VITALS
SYSTOLIC BLOOD PRESSURE: 134 MMHG | HEART RATE: 82 BPM | DIASTOLIC BLOOD PRESSURE: 51 MMHG | OXYGEN SATURATION: 98 % | RESPIRATION RATE: 19 BRPM | TEMPERATURE: 98 F

## 2023-06-13 VITALS
TEMPERATURE: 98 F | OXYGEN SATURATION: 100 % | DIASTOLIC BLOOD PRESSURE: 53 MMHG | SYSTOLIC BLOOD PRESSURE: 141 MMHG | RESPIRATION RATE: 19 BRPM | HEART RATE: 67 BPM

## 2023-06-13 DIAGNOSIS — Z98.890 OTHER SPECIFIED POSTPROCEDURAL STATES: Chronic | ICD-10-CM

## 2023-06-13 DIAGNOSIS — D64.9 ANEMIA, UNSPECIFIED: ICD-10-CM

## 2023-06-13 DIAGNOSIS — S72.90XA UNSPECIFIED FRACTURE OF UNSPECIFIED FEMUR, INITIAL ENCOUNTER FOR CLOSED FRACTURE: Chronic | ICD-10-CM

## 2023-06-13 PROCEDURE — 36430 TRANSFUSION BLD/BLD COMPNT: CPT

## 2023-06-13 PROCEDURE — P9016: CPT

## 2023-06-13 RX ORDER — ACETAMINOPHEN 500 MG
650 TABLET ORAL ONCE
Refills: 0 | Status: COMPLETED | OUTPATIENT
Start: 2023-06-13 | End: 2023-06-13

## 2023-06-13 RX ORDER — DIPHENHYDRAMINE HCL 50 MG
25 CAPSULE ORAL ONCE
Refills: 0 | Status: COMPLETED | OUTPATIENT
Start: 2023-06-13 | End: 2023-06-13

## 2023-06-13 RX ADMIN — Medication 650 MILLIGRAM(S): at 08:19

## 2023-06-13 RX ADMIN — Medication 25 MILLIGRAM(S): at 07:57

## 2023-06-13 RX ADMIN — Medication 650 MILLIGRAM(S): at 07:58

## 2023-06-22 ENCOUNTER — OUTPATIENT (OUTPATIENT)
Dept: OUTPATIENT SERVICES | Facility: HOSPITAL | Age: 88
LOS: 1 days | Discharge: HOME | End: 2023-06-22
Payer: MEDICARE

## 2023-06-22 DIAGNOSIS — D64.9 ANEMIA, UNSPECIFIED: ICD-10-CM

## 2023-06-22 DIAGNOSIS — S72.90XA UNSPECIFIED FRACTURE OF UNSPECIFIED FEMUR, INITIAL ENCOUNTER FOR CLOSED FRACTURE: Chronic | ICD-10-CM

## 2023-06-22 DIAGNOSIS — Z98.890 OTHER SPECIFIED POSTPROCEDURAL STATES: Chronic | ICD-10-CM

## 2023-06-22 LAB — BLD GP AB SCN SERPL QL: SIGNIFICANT CHANGE UP

## 2023-06-22 PROCEDURE — 86923 COMPATIBILITY TEST ELECTRIC: CPT

## 2023-06-22 PROCEDURE — 86850 RBC ANTIBODY SCREEN: CPT

## 2023-06-22 PROCEDURE — 86900 BLOOD TYPING SEROLOGIC ABO: CPT

## 2023-06-22 PROCEDURE — 86901 BLOOD TYPING SEROLOGIC RH(D): CPT

## 2023-06-22 PROCEDURE — 36415 COLL VENOUS BLD VENIPUNCTURE: CPT

## 2023-06-23 ENCOUNTER — OUTPATIENT (OUTPATIENT)
Dept: OUTPATIENT SERVICES | Facility: HOSPITAL | Age: 88
LOS: 1 days | Discharge: HOME | End: 2023-06-23
Payer: MEDICARE

## 2023-06-23 VITALS
SYSTOLIC BLOOD PRESSURE: 120 MMHG | RESPIRATION RATE: 16 BRPM | OXYGEN SATURATION: 99 % | DIASTOLIC BLOOD PRESSURE: 33 MMHG | TEMPERATURE: 98 F | HEART RATE: 70 BPM

## 2023-06-23 VITALS
SYSTOLIC BLOOD PRESSURE: 139 MMHG | HEART RATE: 69 BPM | RESPIRATION RATE: 16 BRPM | OXYGEN SATURATION: 98 % | DIASTOLIC BLOOD PRESSURE: 49 MMHG | TEMPERATURE: 99 F

## 2023-06-23 DIAGNOSIS — Z98.890 OTHER SPECIFIED POSTPROCEDURAL STATES: Chronic | ICD-10-CM

## 2023-06-23 DIAGNOSIS — S72.90XA UNSPECIFIED FRACTURE OF UNSPECIFIED FEMUR, INITIAL ENCOUNTER FOR CLOSED FRACTURE: Chronic | ICD-10-CM

## 2023-06-23 DIAGNOSIS — D64.9 ANEMIA, UNSPECIFIED: ICD-10-CM

## 2023-06-23 PROCEDURE — P9016: CPT | Mod: XU

## 2023-06-23 PROCEDURE — 36430 TRANSFUSION BLD/BLD COMPNT: CPT

## 2023-06-23 PROCEDURE — P9040: CPT

## 2023-06-23 RX ORDER — DIPHENHYDRAMINE HCL 50 MG
25 CAPSULE ORAL ONCE
Refills: 0 | Status: COMPLETED | OUTPATIENT
Start: 2023-06-23 | End: 2023-06-23

## 2023-06-23 RX ORDER — ACETAMINOPHEN 500 MG
650 TABLET ORAL ONCE
Refills: 0 | Status: COMPLETED | OUTPATIENT
Start: 2023-06-23 | End: 2023-06-23

## 2023-06-23 RX ADMIN — Medication 650 MILLIGRAM(S): at 10:56

## 2023-06-23 RX ADMIN — Medication 650 MILLIGRAM(S): at 09:57

## 2023-06-23 RX ADMIN — Medication 25 MILLIGRAM(S): at 09:57

## 2023-07-14 ENCOUNTER — EMERGENCY (EMERGENCY)
Facility: HOSPITAL | Age: 88
LOS: 0 days | Discharge: HOME | End: 2023-07-14
Attending: STUDENT IN AN ORGANIZED HEALTH CARE EDUCATION/TRAINING PROGRAM
Payer: MEDICARE

## 2023-07-14 VITALS
HEART RATE: 65 BPM | OXYGEN SATURATION: 97 % | DIASTOLIC BLOOD PRESSURE: 56 MMHG | RESPIRATION RATE: 17 BRPM | TEMPERATURE: 98 F | SYSTOLIC BLOOD PRESSURE: 134 MMHG

## 2023-07-14 VITALS — WEIGHT: 117.07 LBS | HEIGHT: 56 IN

## 2023-07-14 DIAGNOSIS — S72.90XA UNSPECIFIED FRACTURE OF UNSPECIFIED FEMUR, INITIAL ENCOUNTER FOR CLOSED FRACTURE: Chronic | ICD-10-CM

## 2023-07-14 DIAGNOSIS — R53.1 WEAKNESS: ICD-10-CM

## 2023-07-14 DIAGNOSIS — E03.9 HYPOTHYROIDISM, UNSPECIFIED: ICD-10-CM

## 2023-07-14 DIAGNOSIS — D46.9 MYELODYSPLASTIC SYNDROME, UNSPECIFIED: ICD-10-CM

## 2023-07-14 DIAGNOSIS — I10 ESSENTIAL (PRIMARY) HYPERTENSION: ICD-10-CM

## 2023-07-14 DIAGNOSIS — Z86.2 PERSONAL HISTORY OF DISEASES OF THE BLOOD AND BLOOD-FORMING ORGANS AND CERTAIN DISORDERS INVOLVING THE IMMUNE MECHANISM: ICD-10-CM

## 2023-07-14 DIAGNOSIS — Z85.6 PERSONAL HISTORY OF LEUKEMIA: ICD-10-CM

## 2023-07-14 DIAGNOSIS — Z79.82 LONG TERM (CURRENT) USE OF ASPIRIN: ICD-10-CM

## 2023-07-14 DIAGNOSIS — E78.5 HYPERLIPIDEMIA, UNSPECIFIED: ICD-10-CM

## 2023-07-14 DIAGNOSIS — Z98.890 OTHER SPECIFIED POSTPROCEDURAL STATES: Chronic | ICD-10-CM

## 2023-07-14 DIAGNOSIS — H35.30 UNSPECIFIED MACULAR DEGENERATION: ICD-10-CM

## 2023-07-14 LAB
ADD ON TEST-SPECIMEN IN LAB: SIGNIFICANT CHANGE UP
ALBUMIN SERPL ELPH-MCNC: 3 G/DL — LOW (ref 3.3–5)
ALP SERPL-CCNC: 39 U/L — LOW (ref 40–120)
ALT FLD-CCNC: 24 U/L — SIGNIFICANT CHANGE UP (ref 12–78)
ANION GAP SERPL CALC-SCNC: 6 MMOL/L — SIGNIFICANT CHANGE UP (ref 5–17)
APTT BLD: 35.2 SEC — SIGNIFICANT CHANGE UP (ref 27.5–35.5)
AST SERPL-CCNC: 16 U/L — SIGNIFICANT CHANGE UP (ref 15–37)
BASOPHILS # BLD AUTO: 0.05 K/UL — SIGNIFICANT CHANGE UP (ref 0–0.2)
BASOPHILS NFR BLD AUTO: 2 % — SIGNIFICANT CHANGE UP (ref 0–2)
BILIRUB SERPL-MCNC: 0.3 MG/DL — SIGNIFICANT CHANGE UP (ref 0.2–1.2)
BLASTS # FLD: 2 % — HIGH (ref 0–0)
BLD GP AB SCN SERPL QL: SIGNIFICANT CHANGE UP
BUN SERPL-MCNC: 35 MG/DL — HIGH (ref 7–23)
CALCIUM SERPL-MCNC: 8.4 MG/DL — LOW (ref 8.5–10.1)
CHLORIDE SERPL-SCNC: 111 MMOL/L — HIGH (ref 96–108)
CO2 SERPL-SCNC: 24 MMOL/L — SIGNIFICANT CHANGE UP (ref 22–31)
CREAT SERPL-MCNC: 1.05 MG/DL — SIGNIFICANT CHANGE UP (ref 0.5–1.3)
EGFR: 52 ML/MIN/1.73M2 — LOW
EOSINOPHIL # BLD AUTO: 0.15 K/UL — SIGNIFICANT CHANGE UP (ref 0–0.5)
EOSINOPHIL NFR BLD AUTO: 6 % — SIGNIFICANT CHANGE UP (ref 0–6)
GLUCOSE SERPL-MCNC: 196 MG/DL — HIGH (ref 70–99)
HCT VFR BLD CALC: 19 % — CRITICAL LOW (ref 34.5–45)
HGB BLD-MCNC: 6.3 G/DL — CRITICAL LOW (ref 11.5–15.5)
INR BLD: 1.26 RATIO — HIGH (ref 0.88–1.16)
LYMPHOCYTES # BLD AUTO: 0.85 K/UL — LOW (ref 1–3.3)
LYMPHOCYTES # BLD AUTO: 34 % — SIGNIFICANT CHANGE UP (ref 13–44)
MANUAL SMEAR VERIFICATION: SIGNIFICANT CHANGE UP
MCHC RBC-ENTMCNC: 30.4 PG — SIGNIFICANT CHANGE UP (ref 27–34)
MCHC RBC-ENTMCNC: 33.2 GM/DL — SIGNIFICANT CHANGE UP (ref 32–36)
MCV RBC AUTO: 91.8 FL — SIGNIFICANT CHANGE UP (ref 80–100)
METAMYELOCYTES # FLD: 3 % — HIGH (ref 0–0)
MONOCYTES # BLD AUTO: 0.27 K/UL — SIGNIFICANT CHANGE UP (ref 0–0.9)
MONOCYTES NFR BLD AUTO: 11 % — SIGNIFICANT CHANGE UP (ref 2–14)
NEUTROPHILS # BLD AUTO: 0.95 K/UL — LOW (ref 1.8–7.4)
NEUTROPHILS NFR BLD AUTO: 36 % — LOW (ref 43–77)
NEUTS BAND # BLD: 2 % — SIGNIFICANT CHANGE UP (ref 0–8)
NRBC # BLD: 0 /100 — SIGNIFICANT CHANGE UP (ref 0–0)
NRBC # BLD: SIGNIFICANT CHANGE UP /100 WBCS (ref 0–0)
OVALOCYTES BLD QL SMEAR: SLIGHT — SIGNIFICANT CHANGE UP
PLAT MORPH BLD: NORMAL — SIGNIFICANT CHANGE UP
PLATELET # BLD AUTO: 95 K/UL — LOW (ref 150–400)
POIKILOCYTOSIS BLD QL AUTO: SLIGHT — SIGNIFICANT CHANGE UP
POTASSIUM SERPL-MCNC: 4.1 MMOL/L — SIGNIFICANT CHANGE UP (ref 3.5–5.3)
POTASSIUM SERPL-SCNC: 4.1 MMOL/L — SIGNIFICANT CHANGE UP (ref 3.5–5.3)
PROT SERPL-MCNC: 6.3 GM/DL — SIGNIFICANT CHANGE UP (ref 6–8.3)
PROTHROM AB SERPL-ACNC: 14.6 SEC — HIGH (ref 10.5–13.4)
RBC # BLD: 2.07 M/UL — LOW (ref 3.8–5.2)
RBC # FLD: 13.5 % — SIGNIFICANT CHANGE UP (ref 10.3–14.5)
RBC BLD AUTO: ABNORMAL
SODIUM SERPL-SCNC: 141 MMOL/L — SIGNIFICANT CHANGE UP (ref 135–145)
VARIANT LYMPHS # BLD: 4 % — SIGNIFICANT CHANGE UP (ref 0–6)
WBC # BLD: 2.49 K/UL — LOW (ref 3.8–10.5)
WBC # FLD AUTO: 2.49 K/UL — LOW (ref 3.8–10.5)

## 2023-07-14 PROCEDURE — P9016: CPT

## 2023-07-14 PROCEDURE — 86900 BLOOD TYPING SEROLOGIC ABO: CPT

## 2023-07-14 PROCEDURE — 85730 THROMBOPLASTIN TIME PARTIAL: CPT

## 2023-07-14 PROCEDURE — 96374 THER/PROPH/DIAG INJ IV PUSH: CPT

## 2023-07-14 PROCEDURE — 36430 TRANSFUSION BLD/BLD COMPNT: CPT

## 2023-07-14 PROCEDURE — 85025 COMPLETE CBC W/AUTO DIFF WBC: CPT

## 2023-07-14 PROCEDURE — 93010 ELECTROCARDIOGRAM REPORT: CPT

## 2023-07-14 PROCEDURE — 99291 CRITICAL CARE FIRST HOUR: CPT

## 2023-07-14 PROCEDURE — 99291 CRITICAL CARE FIRST HOUR: CPT | Mod: 25

## 2023-07-14 PROCEDURE — 86901 BLOOD TYPING SEROLOGIC RH(D): CPT

## 2023-07-14 PROCEDURE — 93005 ELECTROCARDIOGRAM TRACING: CPT

## 2023-07-14 PROCEDURE — 85610 PROTHROMBIN TIME: CPT

## 2023-07-14 PROCEDURE — 86923 COMPATIBILITY TEST ELECTRIC: CPT

## 2023-07-14 PROCEDURE — 86850 RBC ANTIBODY SCREEN: CPT

## 2023-07-14 PROCEDURE — 36415 COLL VENOUS BLD VENIPUNCTURE: CPT

## 2023-07-14 PROCEDURE — 80053 COMPREHEN METABOLIC PANEL: CPT

## 2023-07-14 RX ORDER — DIPHENHYDRAMINE HCL 50 MG
25 CAPSULE ORAL ONCE
Refills: 0 | Status: COMPLETED | OUTPATIENT
Start: 2023-07-14 | End: 2023-07-14

## 2023-07-14 RX ORDER — ACETAMINOPHEN 500 MG
650 TABLET ORAL ONCE
Refills: 0 | Status: COMPLETED | OUTPATIENT
Start: 2023-07-14 | End: 2023-07-14

## 2023-07-14 RX ADMIN — Medication 650 MILLIGRAM(S): at 11:24

## 2023-07-14 RX ADMIN — Medication 25 MILLIGRAM(S): at 11:25

## 2023-07-14 NOTE — ED ADULT NURSE REASSESSMENT NOTE - NS ED NURSE REASSESS COMMENT FT1
pt appears upset when trying to explain the consent form, risk and information on transfusion reaction, pt yells, "I usually go upstairs to get transfusion and all I do is sit and get blood transfusion," made an attempt to explain to her about the policy in ED, pt refused to go over the information and "I'm just signing the form." MD Nelson made aware.

## 2023-07-14 NOTE — ED PROVIDER NOTE - PATIENT PORTAL LINK FT
You can access the FollowMyHealth Patient Portal offered by [facility] by registering at the following website: [email or web address]. By joining [employer]’s FollowMyHealth portal, you will also be able to view your health information using other applications (apps) compatible with our system.

## 2023-07-14 NOTE — ED PROVIDER NOTE - NSFOLLOWUPINSTRUCTIONS_ED_ALL_ED_FT
Seek immediate medical assistance for any new or worsening symptoms. If you have issues obtaining follow up, please call: [phone] or [phone]  to obtain a doctor or specialist who takes your insurance in your area.       Follow-up with a hematologist.

## 2023-07-14 NOTE — ED PROVIDER NOTE - CLINICAL SUMMARY MEDICAL DECISION MAKING FREE TEXT BOX
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  Vitals are normal asymptomatic.  Will give 2 units of PRBCs and DC afterwards.

## 2023-07-14 NOTE — ED ADULT TRIAGE NOTE - CHIEF COMPLAINT QUOTE
ALTAF MEADE FOR BLOOD TRANSFUSION. PT STATES "MY HGB 6.9, PT C/O UNABLE TO GET APPOINTMENT FOR TRANSFUSION TOLD TO COME TO ED, BONE MARROW NOT WORKING". DENIES CP/SOB/N/V/D/FEVER/CHILLS. pmh: MSD.

## 2023-07-28 ENCOUNTER — OUTPATIENT (OUTPATIENT)
Dept: OUTPATIENT SERVICES | Facility: HOSPITAL | Age: 88
LOS: 1 days | Discharge: HOME | End: 2023-07-28
Payer: MEDICARE

## 2023-07-28 DIAGNOSIS — Z98.890 OTHER SPECIFIED POSTPROCEDURAL STATES: Chronic | ICD-10-CM

## 2023-07-28 DIAGNOSIS — D64.9 ANEMIA, UNSPECIFIED: ICD-10-CM

## 2023-07-28 LAB — BLD GP AB SCN SERPL QL: SIGNIFICANT CHANGE UP

## 2023-07-28 PROCEDURE — 86900 BLOOD TYPING SEROLOGIC ABO: CPT

## 2023-07-28 PROCEDURE — 86923 COMPATIBILITY TEST ELECTRIC: CPT

## 2023-07-28 PROCEDURE — 86901 BLOOD TYPING SEROLOGIC RH(D): CPT

## 2023-07-28 PROCEDURE — 86850 RBC ANTIBODY SCREEN: CPT

## 2023-07-28 PROCEDURE — 36415 COLL VENOUS BLD VENIPUNCTURE: CPT

## 2023-07-31 ENCOUNTER — OUTPATIENT (OUTPATIENT)
Dept: OUTPATIENT SERVICES | Facility: HOSPITAL | Age: 88
LOS: 1 days | Discharge: HOME | End: 2023-07-31
Payer: MEDICARE

## 2023-07-31 VITALS
RESPIRATION RATE: 16 BRPM | DIASTOLIC BLOOD PRESSURE: 38 MMHG | SYSTOLIC BLOOD PRESSURE: 114 MMHG | TEMPERATURE: 98 F | OXYGEN SATURATION: 99 % | HEART RATE: 68 BPM

## 2023-07-31 VITALS
SYSTOLIC BLOOD PRESSURE: 148 MMHG | HEART RATE: 70 BPM | DIASTOLIC BLOOD PRESSURE: 59 MMHG | OXYGEN SATURATION: 99 % | TEMPERATURE: 98 F | RESPIRATION RATE: 18 BRPM

## 2023-07-31 DIAGNOSIS — D64.9 ANEMIA, UNSPECIFIED: ICD-10-CM

## 2023-07-31 DIAGNOSIS — S72.90XA UNSPECIFIED FRACTURE OF UNSPECIFIED FEMUR, INITIAL ENCOUNTER FOR CLOSED FRACTURE: Chronic | ICD-10-CM

## 2023-07-31 DIAGNOSIS — Z98.890 OTHER SPECIFIED POSTPROCEDURAL STATES: Chronic | ICD-10-CM

## 2023-07-31 PROCEDURE — P9016: CPT

## 2023-07-31 PROCEDURE — 36430 TRANSFUSION BLD/BLD COMPNT: CPT

## 2023-07-31 RX ORDER — ACETAMINOPHEN 500 MG
650 TABLET ORAL ONCE
Refills: 0 | Status: COMPLETED | OUTPATIENT
Start: 2023-07-31 | End: 2023-07-31

## 2023-07-31 RX ORDER — DIPHENHYDRAMINE HCL 50 MG
25 CAPSULE ORAL ONCE
Refills: 0 | Status: COMPLETED | OUTPATIENT
Start: 2023-07-31 | End: 2023-07-31

## 2023-07-31 RX ADMIN — Medication 650 MILLIGRAM(S): at 08:13

## 2023-07-31 RX ADMIN — Medication 650 MILLIGRAM(S): at 08:06

## 2023-07-31 RX ADMIN — Medication 25 MILLIGRAM(S): at 08:06

## 2023-08-01 NOTE — ED PROVIDER NOTE - DISPOSITION TYPE
Caller: Cynthia Ramirez    Relationship: Self    Best call back number:  [phone]    What medications are you currently taking:   Current Outpatient Medications on File Prior to Visit   Medication Sig Dispense Refill    loratadine (CLARITIN) 10 MG tablet Take 1 tablet by mouth Daily. 30 tablet 0    valACYclovir (Valtrex) 500 MG tablet Take 1 tablet by mouth Daily. 90 tablet 3     No current facility-administered medications on file prior to visit.        Which medication are you concerned about:     valACYclovir (Valtrex) 500 MG tablet     What are your concerns:     WAS TAKING TWO PILLS A DAY WHEN SHE HAD THE OUTBREAK.  SHE IS IN THE [place] UNTIL AUGUST 13.  CAN SHE GET A FEW SENT TO SOMEWHERE IN THE [place]?    OUR TIME 12:16 PM IS 5:16 PM IN [place]          ADMIT

## 2023-08-10 ENCOUNTER — OUTPATIENT (OUTPATIENT)
Dept: OUTPATIENT SERVICES | Facility: HOSPITAL | Age: 88
LOS: 1 days | Discharge: HOME | End: 2023-08-10
Payer: MEDICARE

## 2023-08-10 DIAGNOSIS — S72.90XA UNSPECIFIED FRACTURE OF UNSPECIFIED FEMUR, INITIAL ENCOUNTER FOR CLOSED FRACTURE: Chronic | ICD-10-CM

## 2023-08-10 DIAGNOSIS — D64.9 ANEMIA, UNSPECIFIED: ICD-10-CM

## 2023-08-10 LAB — BLD GP AB SCN SERPL QL: SIGNIFICANT CHANGE UP

## 2023-08-10 PROCEDURE — 36415 COLL VENOUS BLD VENIPUNCTURE: CPT

## 2023-08-10 PROCEDURE — 86900 BLOOD TYPING SEROLOGIC ABO: CPT

## 2023-08-10 PROCEDURE — 86901 BLOOD TYPING SEROLOGIC RH(D): CPT

## 2023-08-10 PROCEDURE — 86850 RBC ANTIBODY SCREEN: CPT

## 2023-08-11 ENCOUNTER — OUTPATIENT (OUTPATIENT)
Dept: OUTPATIENT SERVICES | Facility: HOSPITAL | Age: 88
LOS: 1 days | Discharge: HOME | End: 2023-08-11
Payer: MEDICARE

## 2023-08-11 VITALS
OXYGEN SATURATION: 100 % | TEMPERATURE: 98 F | RESPIRATION RATE: 19 BRPM | HEART RATE: 71 BPM | DIASTOLIC BLOOD PRESSURE: 43 MMHG | SYSTOLIC BLOOD PRESSURE: 136 MMHG

## 2023-08-11 VITALS
OXYGEN SATURATION: 99 % | HEART RATE: 63 BPM | RESPIRATION RATE: 19 BRPM | SYSTOLIC BLOOD PRESSURE: 144 MMHG | TEMPERATURE: 98 F | DIASTOLIC BLOOD PRESSURE: 71 MMHG

## 2023-08-11 DIAGNOSIS — D64.9 ANEMIA, UNSPECIFIED: ICD-10-CM

## 2023-08-11 DIAGNOSIS — S72.90XA UNSPECIFIED FRACTURE OF UNSPECIFIED FEMUR, INITIAL ENCOUNTER FOR CLOSED FRACTURE: Chronic | ICD-10-CM

## 2023-08-11 DIAGNOSIS — Z98.890 OTHER SPECIFIED POSTPROCEDURAL STATES: Chronic | ICD-10-CM

## 2023-08-11 PROCEDURE — P9016: CPT

## 2023-08-11 PROCEDURE — 86923 COMPATIBILITY TEST ELECTRIC: CPT

## 2023-08-11 PROCEDURE — 36430 TRANSFUSION BLD/BLD COMPNT: CPT

## 2023-08-11 RX ORDER — DIPHENHYDRAMINE HCL 50 MG
25 CAPSULE ORAL ONCE
Refills: 0 | Status: COMPLETED | OUTPATIENT
Start: 2023-08-11 | End: 2023-08-11

## 2023-08-11 RX ORDER — ACETAMINOPHEN 500 MG
650 TABLET ORAL ONCE
Refills: 0 | Status: COMPLETED | OUTPATIENT
Start: 2023-08-11 | End: 2023-08-11

## 2023-08-11 RX ADMIN — Medication 25 MILLIGRAM(S): at 07:19

## 2023-08-11 RX ADMIN — Medication 650 MILLIGRAM(S): at 07:19

## 2023-08-11 RX ADMIN — Medication 650 MILLIGRAM(S): at 07:39

## 2023-08-31 ENCOUNTER — OUTPATIENT (OUTPATIENT)
Dept: OUTPATIENT SERVICES | Facility: HOSPITAL | Age: 88
LOS: 1 days | Discharge: HOME | End: 2023-08-31
Payer: MEDICARE

## 2023-08-31 DIAGNOSIS — S72.90XA UNSPECIFIED FRACTURE OF UNSPECIFIED FEMUR, INITIAL ENCOUNTER FOR CLOSED FRACTURE: Chronic | ICD-10-CM

## 2023-08-31 DIAGNOSIS — Z98.890 OTHER SPECIFIED POSTPROCEDURAL STATES: Chronic | ICD-10-CM

## 2023-08-31 DIAGNOSIS — D64.9 ANEMIA, UNSPECIFIED: ICD-10-CM

## 2023-08-31 LAB — BLD GP AB SCN SERPL QL: SIGNIFICANT CHANGE UP

## 2023-08-31 PROCEDURE — 86850 RBC ANTIBODY SCREEN: CPT

## 2023-08-31 PROCEDURE — 86923 COMPATIBILITY TEST ELECTRIC: CPT

## 2023-08-31 PROCEDURE — 36415 COLL VENOUS BLD VENIPUNCTURE: CPT

## 2023-08-31 PROCEDURE — 86900 BLOOD TYPING SEROLOGIC ABO: CPT

## 2023-08-31 PROCEDURE — 86901 BLOOD TYPING SEROLOGIC RH(D): CPT

## 2023-09-01 ENCOUNTER — OUTPATIENT (OUTPATIENT)
Dept: OUTPATIENT SERVICES | Facility: HOSPITAL | Age: 88
LOS: 1 days | Discharge: HOME | End: 2023-09-01
Payer: MEDICARE

## 2023-09-01 VITALS
TEMPERATURE: 98 F | HEART RATE: 71 BPM | RESPIRATION RATE: 18 BRPM | DIASTOLIC BLOOD PRESSURE: 41 MMHG | OXYGEN SATURATION: 97 % | SYSTOLIC BLOOD PRESSURE: 131 MMHG

## 2023-09-01 VITALS
RESPIRATION RATE: 17 BRPM | TEMPERATURE: 97 F | SYSTOLIC BLOOD PRESSURE: 126 MMHG | OXYGEN SATURATION: 100 % | DIASTOLIC BLOOD PRESSURE: 43 MMHG | HEART RATE: 66 BPM

## 2023-09-01 DIAGNOSIS — S72.90XA UNSPECIFIED FRACTURE OF UNSPECIFIED FEMUR, INITIAL ENCOUNTER FOR CLOSED FRACTURE: Chronic | ICD-10-CM

## 2023-09-01 DIAGNOSIS — Z98.890 OTHER SPECIFIED POSTPROCEDURAL STATES: Chronic | ICD-10-CM

## 2023-09-01 DIAGNOSIS — D64.9 ANEMIA, UNSPECIFIED: ICD-10-CM

## 2023-09-01 PROCEDURE — 36430 TRANSFUSION BLD/BLD COMPNT: CPT

## 2023-09-01 PROCEDURE — P9016: CPT

## 2023-09-01 RX ORDER — DIPHENHYDRAMINE HCL 50 MG
25 CAPSULE ORAL ONCE
Refills: 0 | Status: COMPLETED | OUTPATIENT
Start: 2023-09-01 | End: 2023-09-01

## 2023-09-01 RX ORDER — ACETAMINOPHEN 500 MG
650 TABLET ORAL ONCE
Refills: 0 | Status: COMPLETED | OUTPATIENT
Start: 2023-09-01 | End: 2023-09-01

## 2023-09-01 RX ADMIN — Medication 25 MILLIGRAM(S): at 07:26

## 2023-09-01 RX ADMIN — Medication 650 MILLIGRAM(S): at 08:25

## 2023-09-01 RX ADMIN — Medication 650 MILLIGRAM(S): at 07:26

## 2023-09-02 DIAGNOSIS — D64.9 ANEMIA, UNSPECIFIED: ICD-10-CM

## 2023-09-09 ENCOUNTER — EMERGENCY (EMERGENCY)
Facility: HOSPITAL | Age: 88
LOS: 1 days | Discharge: HOME | End: 2023-09-11
Attending: FAMILY MEDICINE
Payer: MEDICARE

## 2023-09-09 VITALS — HEIGHT: 56 IN | WEIGHT: 117.07 LBS

## 2023-09-09 DIAGNOSIS — E86.0 DEHYDRATION: ICD-10-CM

## 2023-09-09 DIAGNOSIS — Z20.822 CONTACT WITH AND (SUSPECTED) EXPOSURE TO COVID-19: ICD-10-CM

## 2023-09-09 DIAGNOSIS — I10 ESSENTIAL (PRIMARY) HYPERTENSION: ICD-10-CM

## 2023-09-09 DIAGNOSIS — R10.30 LOWER ABDOMINAL PAIN, UNSPECIFIED: ICD-10-CM

## 2023-09-09 DIAGNOSIS — R50.9 FEVER, UNSPECIFIED: ICD-10-CM

## 2023-09-09 DIAGNOSIS — Z80.8 FAMILY HISTORY OF MALIGNANT NEOPLASM OF OTHER ORGANS OR SYSTEMS: ICD-10-CM

## 2023-09-09 DIAGNOSIS — Z98.890 OTHER SPECIFIED POSTPROCEDURAL STATES: Chronic | ICD-10-CM

## 2023-09-09 DIAGNOSIS — C92.40 ACUTE PROMYELOCYTIC LEUKEMIA, NOT HAVING ACHIEVED REMISSION: ICD-10-CM

## 2023-09-09 DIAGNOSIS — E03.9 HYPOTHYROIDISM, UNSPECIFIED: ICD-10-CM

## 2023-09-09 DIAGNOSIS — Z80.0 FAMILY HISTORY OF MALIGNANT NEOPLASM OF DIGESTIVE ORGANS: ICD-10-CM

## 2023-09-09 DIAGNOSIS — H35.30 UNSPECIFIED MACULAR DEGENERATION: ICD-10-CM

## 2023-09-09 DIAGNOSIS — S72.90XA UNSPECIFIED FRACTURE OF UNSPECIFIED FEMUR, INITIAL ENCOUNTER FOR CLOSED FRACTURE: Chronic | ICD-10-CM

## 2023-09-09 DIAGNOSIS — N17.9 ACUTE KIDNEY FAILURE, UNSPECIFIED: ICD-10-CM

## 2023-09-09 DIAGNOSIS — B34.1 ENTEROVIRUS INFECTION, UNSPECIFIED: ICD-10-CM

## 2023-09-09 DIAGNOSIS — D63.0 ANEMIA IN NEOPLASTIC DISEASE: ICD-10-CM

## 2023-09-09 DIAGNOSIS — E78.5 HYPERLIPIDEMIA, UNSPECIFIED: ICD-10-CM

## 2023-09-09 DIAGNOSIS — D46.9 MYELODYSPLASTIC SYNDROME, UNSPECIFIED: ICD-10-CM

## 2023-09-09 LAB
ALBUMIN SERPL ELPH-MCNC: 3.3 G/DL — SIGNIFICANT CHANGE UP (ref 3.3–5)
ALP SERPL-CCNC: 43 U/L — SIGNIFICANT CHANGE UP (ref 40–120)
ALT FLD-CCNC: 33 U/L — SIGNIFICANT CHANGE UP (ref 12–78)
ANION GAP SERPL CALC-SCNC: 4 MMOL/L — LOW (ref 5–17)
APPEARANCE UR: CLEAR — SIGNIFICANT CHANGE UP
APTT BLD: 40.1 SEC — HIGH (ref 24.5–35.6)
AST SERPL-CCNC: 18 U/L — SIGNIFICANT CHANGE UP (ref 15–37)
BACTERIA # UR AUTO: NEGATIVE — SIGNIFICANT CHANGE UP
BASOPHILS # BLD AUTO: 0.04 K/UL — SIGNIFICANT CHANGE UP (ref 0–0.2)
BASOPHILS NFR BLD AUTO: 1 % — SIGNIFICANT CHANGE UP (ref 0–2)
BILIRUB SERPL-MCNC: 0.6 MG/DL — SIGNIFICANT CHANGE UP (ref 0.2–1.2)
BILIRUB UR-MCNC: NEGATIVE — SIGNIFICANT CHANGE UP
BUN SERPL-MCNC: 42 MG/DL — HIGH (ref 7–23)
CALCIUM SERPL-MCNC: 9.1 MG/DL — SIGNIFICANT CHANGE UP (ref 8.5–10.1)
CHLORIDE SERPL-SCNC: 107 MMOL/L — SIGNIFICANT CHANGE UP (ref 96–108)
CO2 SERPL-SCNC: 28 MMOL/L — SIGNIFICANT CHANGE UP (ref 22–31)
COLOR SPEC: YELLOW — SIGNIFICANT CHANGE UP
CREAT SERPL-MCNC: 1.14 MG/DL — SIGNIFICANT CHANGE UP (ref 0.5–1.3)
DIFF PNL FLD: ABNORMAL
EGFR: 47 ML/MIN/1.73M2 — LOW
EOSINOPHIL # BLD AUTO: 0.04 K/UL — SIGNIFICANT CHANGE UP (ref 0–0.5)
EOSINOPHIL NFR BLD AUTO: 1 % — SIGNIFICANT CHANGE UP (ref 0–6)
EPI CELLS # UR: SIGNIFICANT CHANGE UP
GLUCOSE SERPL-MCNC: 133 MG/DL — HIGH (ref 70–99)
GLUCOSE UR QL: NEGATIVE — SIGNIFICANT CHANGE UP
HCT VFR BLD CALC: 27.4 % — LOW (ref 34.5–45)
HGB BLD-MCNC: 9.1 G/DL — LOW (ref 11.5–15.5)
INR BLD: 1.16 RATIO — SIGNIFICANT CHANGE UP (ref 0.85–1.18)
KETONES UR-MCNC: NEGATIVE — SIGNIFICANT CHANGE UP
LACTATE SERPL-SCNC: 0.9 MMOL/L — SIGNIFICANT CHANGE UP (ref 0.7–2)
LEUKOCYTE ESTERASE UR-ACNC: ABNORMAL
LYMPHOCYTES # BLD AUTO: 1.41 K/UL — SIGNIFICANT CHANGE UP (ref 1–3.3)
LYMPHOCYTES # BLD AUTO: 34 % — SIGNIFICANT CHANGE UP (ref 13–44)
MANUAL SMEAR VERIFICATION: SIGNIFICANT CHANGE UP
MCHC RBC-ENTMCNC: 28.8 PG — SIGNIFICANT CHANGE UP (ref 27–34)
MCHC RBC-ENTMCNC: 33.2 GM/DL — SIGNIFICANT CHANGE UP (ref 32–36)
MCV RBC AUTO: 86.7 FL — SIGNIFICANT CHANGE UP (ref 80–100)
METAMYELOCYTES # FLD: 4 % — HIGH (ref 0–0)
MONOCYTES # BLD AUTO: 0.42 K/UL — SIGNIFICANT CHANGE UP (ref 0–0.9)
MONOCYTES NFR BLD AUTO: 10 % — SIGNIFICANT CHANGE UP (ref 2–14)
MYELOCYTES NFR BLD: 3 % — HIGH (ref 0–0)
NEUTROPHILS # BLD AUTO: 1.91 K/UL — SIGNIFICANT CHANGE UP (ref 1.8–7.4)
NEUTROPHILS NFR BLD AUTO: 46 % — SIGNIFICANT CHANGE UP (ref 43–77)
NITRITE UR-MCNC: NEGATIVE — SIGNIFICANT CHANGE UP
NRBC # BLD: 0 /100 — SIGNIFICANT CHANGE UP (ref 0–0)
NRBC # BLD: SIGNIFICANT CHANGE UP /100 WBCS (ref 0–0)
PH UR: 6.5 — SIGNIFICANT CHANGE UP (ref 5–8)
PLAT MORPH BLD: NORMAL — SIGNIFICANT CHANGE UP
PLATELET # BLD AUTO: 126 K/UL — LOW (ref 150–400)
POTASSIUM SERPL-MCNC: 4.6 MMOL/L — SIGNIFICANT CHANGE UP (ref 3.5–5.3)
POTASSIUM SERPL-SCNC: 4.6 MMOL/L — SIGNIFICANT CHANGE UP (ref 3.5–5.3)
PROMYELOCYTES # FLD: 1 % — HIGH (ref 0–0)
PROT SERPL-MCNC: 6.8 GM/DL — SIGNIFICANT CHANGE UP (ref 6–8.3)
PROT UR-MCNC: 15
PROTHROM AB SERPL-ACNC: 13 SEC — SIGNIFICANT CHANGE UP (ref 9.5–13)
RAPID RVP RESULT: DETECTED
RBC # BLD: 3.16 M/UL — LOW (ref 3.8–5.2)
RBC # FLD: 14.6 % — HIGH (ref 10.3–14.5)
RBC BLD AUTO: NORMAL — SIGNIFICANT CHANGE UP
RBC CASTS # UR COMP ASSIST: SIGNIFICANT CHANGE UP /HPF (ref 0–4)
RV+EV RNA SPEC QL NAA+PROBE: DETECTED
SARS-COV-2 RNA SPEC QL NAA+PROBE: SIGNIFICANT CHANGE UP
SODIUM SERPL-SCNC: 139 MMOL/L — SIGNIFICANT CHANGE UP (ref 135–145)
SP GR SPEC: 1.01 — SIGNIFICANT CHANGE UP (ref 1.01–1.02)
UROBILINOGEN FLD QL: NEGATIVE — SIGNIFICANT CHANGE UP
WBC # BLD: 4.15 K/UL — SIGNIFICANT CHANGE UP (ref 3.8–10.5)
WBC # FLD AUTO: 4.15 K/UL — SIGNIFICANT CHANGE UP (ref 3.8–10.5)
WBC UR QL: SIGNIFICANT CHANGE UP /HPF (ref 0–5)

## 2023-09-09 PROCEDURE — 81001 URINALYSIS AUTO W/SCOPE: CPT

## 2023-09-09 PROCEDURE — 85027 COMPLETE CBC AUTOMATED: CPT

## 2023-09-09 PROCEDURE — G0378: CPT

## 2023-09-09 PROCEDURE — 71045 X-RAY EXAM CHEST 1 VIEW: CPT | Mod: 26

## 2023-09-09 PROCEDURE — 0225U NFCT DS DNA&RNA 21 SARSCOV2: CPT

## 2023-09-09 PROCEDURE — 93005 ELECTROCARDIOGRAM TRACING: CPT

## 2023-09-09 PROCEDURE — 99222 1ST HOSP IP/OBS MODERATE 55: CPT

## 2023-09-09 PROCEDURE — 99285 EMERGENCY DEPT VISIT HI MDM: CPT

## 2023-09-09 PROCEDURE — 99285 EMERGENCY DEPT VISIT HI MDM: CPT | Mod: 25

## 2023-09-09 PROCEDURE — 96375 TX/PRO/DX INJ NEW DRUG ADDON: CPT

## 2023-09-09 PROCEDURE — 85025 COMPLETE CBC W/AUTO DIFF WBC: CPT

## 2023-09-09 PROCEDURE — 85730 THROMBOPLASTIN TIME PARTIAL: CPT

## 2023-09-09 PROCEDURE — 36415 COLL VENOUS BLD VENIPUNCTURE: CPT

## 2023-09-09 PROCEDURE — 96374 THER/PROPH/DIAG INJ IV PUSH: CPT

## 2023-09-09 PROCEDURE — 87040 BLOOD CULTURE FOR BACTERIA: CPT | Mod: 91

## 2023-09-09 PROCEDURE — 83605 ASSAY OF LACTIC ACID: CPT

## 2023-09-09 PROCEDURE — 85610 PROTHROMBIN TIME: CPT

## 2023-09-09 PROCEDURE — 87086 URINE CULTURE/COLONY COUNT: CPT

## 2023-09-09 PROCEDURE — 71045 X-RAY EXAM CHEST 1 VIEW: CPT

## 2023-09-09 PROCEDURE — 99497 ADVNCD CARE PLAN 30 MIN: CPT | Mod: 25

## 2023-09-09 PROCEDURE — 93010 ELECTROCARDIOGRAM REPORT: CPT

## 2023-09-09 PROCEDURE — 80048 BASIC METABOLIC PNL TOTAL CA: CPT

## 2023-09-09 PROCEDURE — 80053 COMPREHEN METABOLIC PANEL: CPT

## 2023-09-09 RX ORDER — SODIUM CHLORIDE 9 MG/ML
1650 INJECTION INTRAMUSCULAR; INTRAVENOUS; SUBCUTANEOUS ONCE
Refills: 0 | Status: COMPLETED | OUTPATIENT
Start: 2023-09-09 | End: 2023-09-09

## 2023-09-09 RX ORDER — ESCITALOPRAM OXALATE 10 MG/1
5 TABLET, FILM COATED ORAL DAILY
Refills: 0 | Status: DISCONTINUED | OUTPATIENT
Start: 2023-09-09 | End: 2023-09-11

## 2023-09-09 RX ORDER — VANCOMYCIN HCL 1 G
1000 VIAL (EA) INTRAVENOUS ONCE
Refills: 0 | Status: COMPLETED | OUTPATIENT
Start: 2023-09-09 | End: 2023-09-09

## 2023-09-09 RX ORDER — CEFEPIME 1 G/1
2000 INJECTION, POWDER, FOR SOLUTION INTRAMUSCULAR; INTRAVENOUS ONCE
Refills: 0 | Status: COMPLETED | OUTPATIENT
Start: 2023-09-09 | End: 2023-09-09

## 2023-09-09 RX ORDER — ATORVASTATIN CALCIUM 80 MG/1
80 TABLET, FILM COATED ORAL AT BEDTIME
Refills: 0 | Status: DISCONTINUED | OUTPATIENT
Start: 2023-09-09 | End: 2023-09-11

## 2023-09-09 RX ORDER — CEFEPIME 1 G/1
2000 INJECTION, POWDER, FOR SOLUTION INTRAMUSCULAR; INTRAVENOUS ONCE
Refills: 0 | Status: DISCONTINUED | OUTPATIENT
Start: 2023-09-09 | End: 2023-09-09

## 2023-09-09 RX ORDER — LOSARTAN POTASSIUM 100 MG/1
50 TABLET, FILM COATED ORAL AT BEDTIME
Refills: 0 | Status: DISCONTINUED | OUTPATIENT
Start: 2023-09-09 | End: 2023-09-11

## 2023-09-09 RX ORDER — PREGABALIN 225 MG/1
1 CAPSULE ORAL
Qty: 0 | Refills: 0 | DISCHARGE

## 2023-09-09 RX ORDER — CALCIUM CARBONATE 500(1250)
1 TABLET ORAL DAILY
Refills: 0 | Status: DISCONTINUED | OUTPATIENT
Start: 2023-09-09 | End: 2023-09-11

## 2023-09-09 RX ORDER — FENOFIBRATE,MICRONIZED 130 MG
145 CAPSULE ORAL DAILY
Refills: 0 | Status: DISCONTINUED | OUTPATIENT
Start: 2023-09-09 | End: 2023-09-11

## 2023-09-09 RX ORDER — LEVOTHYROXINE SODIUM 125 MCG
50 TABLET ORAL DAILY
Refills: 0 | Status: DISCONTINUED | OUTPATIENT
Start: 2023-09-09 | End: 2023-09-11

## 2023-09-09 RX ORDER — CHOLECALCIFEROL (VITAMIN D3) 125 MCG
1000 CAPSULE ORAL DAILY
Refills: 0 | Status: DISCONTINUED | OUTPATIENT
Start: 2023-09-09 | End: 2023-09-11

## 2023-09-09 RX ORDER — ASPIRIN/CALCIUM CARB/MAGNESIUM 324 MG
81 TABLET ORAL DAILY
Refills: 0 | Status: DISCONTINUED | OUTPATIENT
Start: 2023-09-09 | End: 2023-09-11

## 2023-09-09 RX ORDER — ASCORBIC ACID 60 MG
500 TABLET,CHEWABLE ORAL DAILY
Refills: 0 | Status: DISCONTINUED | OUTPATIENT
Start: 2023-09-09 | End: 2023-09-11

## 2023-09-09 RX ADMIN — CEFEPIME 2000 MILLIGRAM(S): 1 INJECTION, POWDER, FOR SOLUTION INTRAMUSCULAR; INTRAVENOUS at 18:49

## 2023-09-09 RX ADMIN — Medication 250 MILLIGRAM(S): at 18:50

## 2023-09-09 RX ADMIN — SODIUM CHLORIDE 1650 MILLILITER(S): 9 INJECTION INTRAMUSCULAR; INTRAVENOUS; SUBCUTANEOUS at 18:44

## 2023-09-09 NOTE — ED PROVIDER NOTE - ENMT, MLM
Airway patent, Nasal mucosa clear. Mouth with normal mucosa. Throat has no vesicles, no oropharyngeal exudates and uvula is midline. 3 teeth left on bottom jaw.

## 2023-09-09 NOTE — H&P ADULT - NSHPPHYSICALEXAM_GEN_ALL_CORE
ICU Vital Signs Last 24 Hrs  T(C): 37.1 (09 Sep 2023 19:45), Max: 37.8 (09 Sep 2023 16:27)  T(F): 98.7 (09 Sep 2023 19:45), Max: 100.1 (09 Sep 2023 16:27)  HR: 78 (09 Sep 2023 21:00) (78 - 84)  BP: 131/56 (09 Sep 2023 21:00) (131/56 - 139/65)  BP(mean): 69 (09 Sep 2023 16:27) (69 - 69)    RR: 18 (09 Sep 2023 21:00) (18 - 19)  SpO2: 100% (09 Sep 2023 21:00) (99% - 100%)    O2 Parameters below as of 09 Sep 2023 21:00  Patient On (Oxygen Delivery Method): room air

## 2023-09-09 NOTE — H&P ADULT - ASSESSMENT
Pt is adm with     Immunocompromised condition  Fever 102F   Viral Syndrome, Enterorhino virus   KALYN   Dehydration    - s/p 1650 ml NS in the ED  - s/p 2gm CEfepime and 1 gm Vanco in the ED  - adm to observation  -  f/u blood cx,  - f/u renal function  - DVT proph ambulation  - Full Code

## 2023-09-09 NOTE — ED ADULT TRIAGE NOTE - CHIEF COMPLAINT QUOTE
Pt presents to er with complaints of lower abd pain and 102 fever this morning,, states she has a history of MDS, last chemo treatment was 4 weeks ago, sees , was instructed to be evaluated if she ever gets a fever, denies new urinary symptoms. Took Tylenol earlier today, denies sick contacts.

## 2023-09-09 NOTE — ED PROVIDER NOTE - OBJECTIVE STATEMENT
86 y/o female with a PMHx of MDS Dr. Holliday on chemo, last treatment 4 weeks, HTN, HLD, anemia, hard of hearing, post nasal drip presents to the ED c/o fever Tmax 102 since yesterday. Started feeling unwell last night with chills and lower abdominal pain. Took Tylenol earlier today. Endorses back pain, intermittent dysuria, 1 episode of diarrhea, chills, lower abdominal pain, cough with clear/brown sputum. Denies N/V, sick contact, recent travel. Has not recently eaten raw food.

## 2023-09-09 NOTE — H&P ADULT - HISTORY OF PRESENT ILLNESS
Pt is a pleasant 86 y/o female with a PMHx of MDS Dr. Holliday on chemo, last treatment 4 weeks, HTN, HLD, anemia, hard of hearing, post nasal drip presents to the ED c/o fever Tmax 102 since yesterday. Started feeling unwell last night with chills and lower abdominal pain. Took Tylenol earlier today. Endorses back pain, intermittent dysuria, 1 episode of diarrhea, chills, lower abdominal pain, cough with clear/brown sputum. Denies N/V, sick contact, recent travel. Has not recently eaten raw food

## 2023-09-09 NOTE — ED PROVIDER NOTE - CLINICAL SUMMARY MEDICAL DECISION MAKING FREE TEXT BOX
Patient with history of MDS, HTN here with fever since yesterday, chills, possible burning on urination.

## 2023-09-09 NOTE — H&P ADULT - TIME BILLING
I spent a total of 70 minutes on the date of this encounter coordinating the patient's care. This includes reviewing documentation pertinent to this admission, results and imaging in addition to completing a history and physical examination on the patient. Further tests, medications, and procedures have been ordered as indicated. Laboratory results and the plan of care were communicated to the patient . Supporting documentation was completed and added to the patient's chart.

## 2023-09-10 LAB
ANION GAP SERPL CALC-SCNC: 4 MMOL/L — LOW (ref 5–17)
BUN SERPL-MCNC: 31 MG/DL — HIGH (ref 7–23)
CALCIUM SERPL-MCNC: 7.8 MG/DL — LOW (ref 8.5–10.1)
CHLORIDE SERPL-SCNC: 112 MMOL/L — HIGH (ref 96–108)
CO2 SERPL-SCNC: 24 MMOL/L — SIGNIFICANT CHANGE UP (ref 22–31)
CREAT SERPL-MCNC: 0.8 MG/DL — SIGNIFICANT CHANGE UP (ref 0.5–1.3)
EGFR: 72 ML/MIN/1.73M2 — SIGNIFICANT CHANGE UP
GLUCOSE SERPL-MCNC: 128 MG/DL — HIGH (ref 70–99)
HCT VFR BLD CALC: 22 % — LOW (ref 34.5–45)
HGB BLD-MCNC: 7.3 G/DL — LOW (ref 11.5–15.5)
MCHC RBC-ENTMCNC: 28.7 PG — SIGNIFICANT CHANGE UP (ref 27–34)
MCHC RBC-ENTMCNC: 33.2 GM/DL — SIGNIFICANT CHANGE UP (ref 32–36)
MCV RBC AUTO: 86.6 FL — SIGNIFICANT CHANGE UP (ref 80–100)
PLATELET # BLD AUTO: 100 K/UL — LOW (ref 150–400)
POTASSIUM SERPL-MCNC: 4.3 MMOL/L — SIGNIFICANT CHANGE UP (ref 3.5–5.3)
POTASSIUM SERPL-SCNC: 4.3 MMOL/L — SIGNIFICANT CHANGE UP (ref 3.5–5.3)
RBC # BLD: 2.54 M/UL — LOW (ref 3.8–5.2)
RBC # FLD: 14.6 % — HIGH (ref 10.3–14.5)
SODIUM SERPL-SCNC: 140 MMOL/L — SIGNIFICANT CHANGE UP (ref 135–145)
WBC # BLD: 2.19 K/UL — LOW (ref 3.8–10.5)
WBC # FLD AUTO: 2.19 K/UL — LOW (ref 3.8–10.5)

## 2023-09-10 PROCEDURE — 99232 SBSQ HOSP IP/OBS MODERATE 35: CPT

## 2023-09-10 RX ORDER — INFLUENZA VIRUS VACCINE 15; 15; 15; 15 UG/.5ML; UG/.5ML; UG/.5ML; UG/.5ML
0.7 SUSPENSION INTRAMUSCULAR ONCE
Refills: 0 | Status: DISCONTINUED | OUTPATIENT
Start: 2023-09-10 | End: 2023-09-11

## 2023-09-10 RX ADMIN — Medication 500 MILLIGRAM(S): at 09:57

## 2023-09-10 RX ADMIN — LOSARTAN POTASSIUM 50 MILLIGRAM(S): 100 TABLET, FILM COATED ORAL at 20:54

## 2023-09-10 RX ADMIN — ATORVASTATIN CALCIUM 80 MILLIGRAM(S): 80 TABLET, FILM COATED ORAL at 20:54

## 2023-09-10 RX ADMIN — Medication 50 MICROGRAM(S): at 05:50

## 2023-09-10 RX ADMIN — Medication 81 MILLIGRAM(S): at 09:57

## 2023-09-10 RX ADMIN — Medication 1000 UNIT(S): at 09:57

## 2023-09-10 RX ADMIN — ESCITALOPRAM OXALATE 5 MILLIGRAM(S): 10 TABLET, FILM COATED ORAL at 09:57

## 2023-09-10 RX ADMIN — Medication 1 TABLET(S): at 09:57

## 2023-09-10 RX ADMIN — Medication 145 MILLIGRAM(S): at 09:57

## 2023-09-10 NOTE — PATIENT PROFILE ADULT - ...
10-Sep-2023 00:14:36

## 2023-09-10 NOTE — ED ADULT NURSE NOTE - NS ED NURSE REPORT GIVEN TO FT
Group Psychotherapy    Site: [facility]    Clinical status of patient: Outpatient    8/22/2017    Length of service:[id number]    Referred by: Addictive Behavior Unit     Number of patients in attendance: 5    Target symptoms: anxiety; etoh abuse      Patient's response to intervention:  The patient's response to intervention is active listening, self-disclosure, feedback to other patients.    Progress toward goals and other mental status changes:  The patient's progress toward goals is good.    Interval history: Pt reports things continue to go well. Discussed his belief in and acceptance of the disease model of addiction.        Diagnosis: Social Anxiety D/O; Alcohol use disorder, severe, in early remission           Plan: group psychotherapy    Return to clinic: weekly    floor RN

## 2023-09-10 NOTE — PATIENT PROFILE ADULT - FALL HARM RISK - HARM RISK INTERVENTIONS

## 2023-09-10 NOTE — PROGRESS NOTE ADULT - SUBJECTIVE AND OBJECTIVE BOX
CC: Pt is a pleasant 88 y/o female with a PMHx of GEO Holliday on chemo, last treatment 4 weeks, HTN, HLD, anemia, hard of hearing, post nasal drip presents to the ED c/o fever Tmax 102 since yesterday. Started feeling unwell last night with chills and lower abdominal pain. Took Tylenol earlier today. Endorses back pain, intermittent dysuria, 1 episode of diarrhea, chills, lower abdominal pain, cough with clear/brown sputum. Denies N/V, sick contact, recent travel. Has not recently eaten raw food.    9/10 - mild cough, no sputum; no ha cp palps sob abdo pain     Vital Signs Last 24 Hrs  T(F): 99.6 (10 Sep 2023 15:50), Max: 99.9 (10 Sep 2023 01:17)  HR: 72 (10 Sep 2023 15:50) (71 - 84)  BP: 129/45 (10 Sep 2023 15:50) (115/43 - 139/65)  RR: 18 (10 Sep 2023 15:50) (16 - 18)  SpO2: 96% (10 Sep 2023 15:50) (93% - 100%)  Parameters below as of 10 Sep 2023 15:50  Patient On (Oxygen Delivery Method): room air  Constitutional: NAD, awake and alert  HEENT: PERR, EOMI  Neck: Soft and supple,  No JVD  Respiratory: Breath sounds are clear bilaterally, No wheezing, rales or rhonchi  Cardiovascular: S1 and S2, regular rate and rhythm, no Murmurs  Gastrointestinal: Bowel Sounds present, soft, nontender, nondistended  Extremities: No peripheral edema  Vascular: 2+ peripheral pulses  Neurological: A/O x 3, no focal deficits  Musculoskeletal: 5/5 strength b/l upper and lower extremities  Skin: No rashes    MEDICATIONS:  MEDICATIONS  (STANDING):  ascorbic acid 500 milliGRAM(s) Oral daily  aspirin enteric coated 81 milliGRAM(s) Oral daily  atorvastatin 80 milliGRAM(s) Oral at bedtime  calcium carbonate    500 mG (Tums) Chewable 1 Tablet(s) Chew daily  cholecalciferol 1000 Unit(s) Oral daily  escitalopram 5 milliGRAM(s) Oral daily  fenofibrate Tablet 145 milliGRAM(s) Oral daily  influenza  Vaccine (HIGH DOSE) 0.7 milliLiter(s) IntraMuscular once  levothyroxine 50 MICROGram(s) Oral daily  losartan 50 milliGRAM(s) Oral at bedtime      LABS: All Labs Reviewed                      7.3    2.19  )-----------( 100      ( 10 Sep 2023 05:45 )             22.0   140  |  112<H>  |  31<H>  ----------------------------<  128<H>  4.3   |  24  |  0.80  Ca    7.8<L>      10 Sep 2023 05:45  TPro  6.8  /  Alb  3.3  /  TBili  0.6  /  DBili  x   /  AST  18  /  ALT  33  /  AlkPhos  43  09-09  PT/INR - ( 09 Sep 2023 18:30 )   PT: 13.0 sec;   INR: 1.16 ratio        RADIOLOGY/EKG:  < from: Xray Chest 1 View-PORTABLE IMMEDIATE (09.09.23 @ 18:19) >  Unchanged hazy opacity lateral left mid/lower lung with apparent   overlying the left breast implant.

## 2023-09-10 NOTE — PROGRESS NOTE ADULT - ASSESSMENT
Immunocompromised condition - MDS   Fever 102F   Viral Syndrome, Enterorhino virus   KALYN   Dehydration    Plan:  cont to monitor today follow-up cultures   - s/p 1650 ml NS in the ED  - s/p 2gm Cefepime and 1 gm Vanco in the ED  - adm to observation  -  f/u blood cx,  - f/u renal function - improving   - DVT proph ambulation  - Full Code

## 2023-09-11 ENCOUNTER — TRANSCRIPTION ENCOUNTER (OUTPATIENT)
Age: 88
End: 2023-09-11

## 2023-09-11 VITALS
DIASTOLIC BLOOD PRESSURE: 47 MMHG | SYSTOLIC BLOOD PRESSURE: 140 MMHG | HEART RATE: 66 BPM | OXYGEN SATURATION: 97 % | TEMPERATURE: 98 F | RESPIRATION RATE: 18 BRPM

## 2023-09-11 PROCEDURE — 99239 HOSP IP/OBS DSCHRG MGMT >30: CPT

## 2023-09-11 RX ORDER — ESCITALOPRAM OXALATE 10 MG/1
1 TABLET, FILM COATED ORAL
Qty: 0 | Refills: 0 | DISCHARGE
Start: 2023-09-11

## 2023-09-11 RX ORDER — ACETAMINOPHEN 500 MG
650 TABLET ORAL ONCE
Refills: 0 | Status: DISCONTINUED | OUTPATIENT
Start: 2023-09-11 | End: 2023-09-11

## 2023-09-11 RX ORDER — FUROSEMIDE 40 MG
20 TABLET ORAL ONCE
Refills: 0 | Status: DISCONTINUED | OUTPATIENT
Start: 2023-09-11 | End: 2023-09-11

## 2023-09-11 RX ORDER — ESCITALOPRAM OXALATE 10 MG/1
1 TABLET, FILM COATED ORAL
Refills: 0 | DISCHARGE

## 2023-09-11 RX ADMIN — Medication 81 MILLIGRAM(S): at 10:03

## 2023-09-11 RX ADMIN — Medication 500 MILLIGRAM(S): at 10:03

## 2023-09-11 RX ADMIN — Medication 145 MILLIGRAM(S): at 10:03

## 2023-09-11 RX ADMIN — Medication 50 MICROGRAM(S): at 05:19

## 2023-09-11 RX ADMIN — Medication 1 TABLET(S): at 10:04

## 2023-09-11 RX ADMIN — ESCITALOPRAM OXALATE 5 MILLIGRAM(S): 10 TABLET, FILM COATED ORAL at 10:04

## 2023-09-11 RX ADMIN — Medication 1000 UNIT(S): at 10:03

## 2023-09-11 NOTE — DISCHARGE NOTE NURSING/CASE MANAGEMENT/SOCIAL WORK - NSDCVIVACCINE_GEN_ALL_CORE_FT
influenza, injectable, quadrivalent, preservative free; 08-Jan-2020 16:16; Milana Shook (MARK); [employer]; [id number] (Exp. Date: 30-Jun-2020); IntraMuscular; Deltoid Right.; 0.5 milliLiter(s); VIS (VIS Published: 15-Aug-2019, VIS Presented: 08-Jan-2020);

## 2023-09-11 NOTE — DISCHARGE NOTE PROVIDER - NSDCCPCAREPLAN_GEN_ALL_CORE_FT
PRINCIPAL DISCHARGE DIAGNOSIS  Diagnosis: Fever  Assessment and Plan of Treatment: due to enterorhinovirus   supportive treatment only, take over the counter cough medicine as needed      SECONDARY DISCHARGE DIAGNOSES  Diagnosis: Myelodysplastic syndrome  Assessment and Plan of Treatment: please see Dr Del Valle as scheduled for a transfusion

## 2023-09-11 NOTE — DISCHARGE NOTE PROVIDER - CARE PROVIDER_API CALL
Romaine Kirkh  Medical Oncology  [address] [place] [address], [address] [place] [address]  Phone: [phone]  Fax: [phone]  Follow Up Time: 1-3 Days

## 2023-09-11 NOTE — DISCHARGE NOTE NURSING/CASE MANAGEMENT/SOCIAL WORK - PATIENT PORTAL LINK FT
You can access the FollowMyHealth Patient Portal offered by [facility] by registering at the following website: [email or web address]. By joining [employer]’s FollowMyHealth portal, you will also be able to view your health information using other applications (apps) compatible with our system.

## 2023-09-11 NOTE — DISCHARGE NOTE PROVIDER - HOSPITAL COURSE
CC: Pt is a pleasant 88 y/o female with a PMHx of MDS Dr. Holliday on chemo, last treatment 4 weeks, HTN, HLD, anemia, hard of hearing, post nasal drip presents to the ED c/o fever Tmax 102 since yesterday. Started feeling unwell last night with chills and lower abdominal pain. Took Tylenol earlier today. Endorses back pain, intermittent dysuria, 1 episode of diarrhea, chills, lower abdominal pain, cough with clear/brown sputum. Denies N/V, sick contact, recent travel. Has not recently eaten raw food.    HOSPITAL COURSE:   Immunocompromised condition - MDS   Fever 102F   Viral Syndrome, Enterorhino virus   KALYN   Dehydration    Plan:  On follow-up temps better, cultures are negative  - f/u renal function - improving   discussed with patient's OP ONC Dr Kirk, he recommends 1U PRBCS prior to discharge  patient reports that she feels well and would like to keep her regular OP appointment with his office and return for PRBCS if needed.    OTHER DETAILS:  9/11 - no cp palps sob abdo pain, is ambulating without lightheadedness     PHYSICAL EXAM:  T(F): 97.9 (11 Sep 2023 08:28), Max: 99.6 (10 Sep 2023 15:50)  HR: 66 (11 Sep 2023 08:28) (66 - 72)  BP: 140/47 (11 Sep 2023 08:28) (116/34 - 140/47)  RR: 18 (11 Sep 2023 08:28) (17 - 18)  SpO2: 97% (11 Sep 2023 08:28) (95% - 97%)/RA   GENERAL: NAD, able to lie flat in bed  HEAD:  Atraumatic, Normocephalic  EYES: EOMI, PERRLA, normal sclera  ENT: Moist mucous membranes  NECK: Supple, No JVD, no nuchal rigidity  CHEST/LUNG: Clear to auscultation bilaterally; No rales, rhonchi, wheezing, or rubs. Unlabored respirations  HEART: Regular rate and rhythm; No murmurs, rubs, or gallops  ABDOMEN: Bowel sounds present; Soft, Nontender, Nondistended. No hepatomegaly  EXTREMITIES:  no pitting bilaterally  NERVOUS SYSTEM:  Alert & Oriented X3, speech clear. No focal motor or sensory deficits  MSK: FROM all 4 extremities, full and equal strength  SKIN: No rashes or lesions    LABS: All Labs Reviewed:                  7.3    2.19  )-----------( 100      ( 10 Sep 2023 05:45 )             22.0   140  |  112<H>  |  31<H>  ----------------------------<  128<H>  4.3   |  24  |  0.80  Ca    7.8<L>      10 Sep 2023 05:45    RADIOLOGY/EKG:  < from: Xray Chest 1 View-PORTABLE IMMEDIATE (09.09.23 @ 18:19) >  Unchanged hazy opacity lateral left mid/lower lung with apparent   overlying the left breast implant.    time spent on discharge - 50 mins

## 2023-09-11 NOTE — DISCHARGE NOTE NURSING/CASE MANAGEMENT/SOCIAL WORK - NSDCPEFALRISK_GEN_ALL_CORE
For information on Fall & Injury Prevention, visit: [email or web address] OR  [email or web address] OR  https://www.cdc.gov/steadi/patient.html

## 2023-09-11 NOTE — DISCHARGE NOTE PROVIDER - NSDCMRMEDTOKEN_GEN_ALL_CORE_FT
ascorbic acid 500 mg oral tablet: 1 tab(s) orally once a day  aspirin 81 mg oral tablet: 1 tab(s) orally once a day  Caltrate 600 mg oral tablet: 1 tab(s) orally once a day  cholecalciferol 25 mcg (1000 intl units) oral tablet: 1 tab(s) orally once a day  escitalopram 5 mg oral tablet: 1 tab(s) orally once a day  fenofibrate 160 mg oral tablet: 1 tab(s) orally once a day  levothyroxine 50 mcg (0.05 mg) oral tablet: 1 tab(s) orally once a day  losartan 50 mg oral tablet: 1 tab(s) orally once a day (in the evening)  PreserVision AREDS 2 oral capsule: 1 cap(s) orally once a day  Probiotic Formula oral capsule: 1 cap(s) orally once a day  Refresh ophthalmic solution: 1 drop(s) in each eye once a day as needed for  rosuvastatin 20 mg oral tablet: 1 tab(s) orally once a day  vitamin E 400 intl units oral capsule: 1 cap(s) orally once a day

## 2023-09-12 LAB
CULTURE RESULTS: SIGNIFICANT CHANGE UP
SPECIMEN SOURCE: SIGNIFICANT CHANGE UP

## 2023-09-13 NOTE — ED ADULT NURSE NOTE - CAS EDP DISCH TYPE
89 yr old f w/ a pmh significant for afib (on eliquis), CAD s/p stents, cataracts, and breast cancer in remission who presents s/p fall. As per family, pt was walking to the living room, when she had a mechanical fall and fell; "I slipped over my feet when trying to turn". Currently c/o R hand pain. Pt denies any LOC, chest pain, nausea, vomiting, fevers, chills or any other medical complaints. Family unsure of last tetanus. Home

## 2023-09-15 ENCOUNTER — OUTPATIENT (OUTPATIENT)
Dept: OUTPATIENT SERVICES | Facility: HOSPITAL | Age: 88
LOS: 1 days | Discharge: HOME | End: 2023-09-15
Payer: MEDICARE

## 2023-09-15 DIAGNOSIS — D64.9 ANEMIA, UNSPECIFIED: ICD-10-CM

## 2023-09-15 DIAGNOSIS — Z98.890 OTHER SPECIFIED POSTPROCEDURAL STATES: Chronic | ICD-10-CM

## 2023-09-15 LAB
BLD GP AB SCN SERPL QL: SIGNIFICANT CHANGE UP
CULTURE RESULTS: SIGNIFICANT CHANGE UP
CULTURE RESULTS: SIGNIFICANT CHANGE UP
SPECIMEN SOURCE: SIGNIFICANT CHANGE UP
SPECIMEN SOURCE: SIGNIFICANT CHANGE UP

## 2023-09-15 PROCEDURE — 86923 COMPATIBILITY TEST ELECTRIC: CPT

## 2023-09-15 PROCEDURE — 86850 RBC ANTIBODY SCREEN: CPT

## 2023-09-15 PROCEDURE — 36415 COLL VENOUS BLD VENIPUNCTURE: CPT

## 2023-09-15 PROCEDURE — 86901 BLOOD TYPING SEROLOGIC RH(D): CPT

## 2023-09-15 PROCEDURE — 86900 BLOOD TYPING SEROLOGIC ABO: CPT

## 2023-09-18 ENCOUNTER — OUTPATIENT (OUTPATIENT)
Dept: OUTPATIENT SERVICES | Facility: HOSPITAL | Age: 88
LOS: 1 days | Discharge: HOME | End: 2023-09-18
Payer: MEDICARE

## 2023-09-18 VITALS
TEMPERATURE: 98 F | SYSTOLIC BLOOD PRESSURE: 138 MMHG | HEART RATE: 64 BPM | RESPIRATION RATE: 19 BRPM | OXYGEN SATURATION: 99 % | DIASTOLIC BLOOD PRESSURE: 51 MMHG

## 2023-09-18 VITALS
TEMPERATURE: 97 F | SYSTOLIC BLOOD PRESSURE: 129 MMHG | OXYGEN SATURATION: 100 % | RESPIRATION RATE: 19 BRPM | HEART RATE: 70 BPM | DIASTOLIC BLOOD PRESSURE: 45 MMHG

## 2023-09-18 DIAGNOSIS — D64.9 ANEMIA, UNSPECIFIED: ICD-10-CM

## 2023-09-18 DIAGNOSIS — Z98.890 OTHER SPECIFIED POSTPROCEDURAL STATES: Chronic | ICD-10-CM

## 2023-09-18 DIAGNOSIS — S72.90XA UNSPECIFIED FRACTURE OF UNSPECIFIED FEMUR, INITIAL ENCOUNTER FOR CLOSED FRACTURE: Chronic | ICD-10-CM

## 2023-09-18 PROCEDURE — 36430 TRANSFUSION BLD/BLD COMPNT: CPT

## 2023-09-18 PROCEDURE — P9016: CPT

## 2023-09-18 RX ORDER — ACETAMINOPHEN 500 MG
650 TABLET ORAL ONCE
Refills: 0 | Status: COMPLETED | OUTPATIENT
Start: 2023-09-18 | End: 2023-09-18

## 2023-09-18 RX ORDER — DIPHENHYDRAMINE HCL 50 MG
25 CAPSULE ORAL ONCE
Refills: 0 | Status: COMPLETED | OUTPATIENT
Start: 2023-09-18 | End: 2023-09-18

## 2023-09-18 RX ADMIN — Medication 25 MILLIGRAM(S): at 07:25

## 2023-09-18 RX ADMIN — Medication 650 MILLIGRAM(S): at 07:30

## 2023-09-18 RX ADMIN — Medication 650 MILLIGRAM(S): at 07:26

## 2023-10-04 ENCOUNTER — OUTPATIENT (OUTPATIENT)
Dept: OUTPATIENT SERVICES | Facility: HOSPITAL | Age: 88
LOS: 1 days | Discharge: HOME | End: 2023-10-04
Payer: MEDICARE

## 2023-10-04 DIAGNOSIS — D64.9 ANEMIA, UNSPECIFIED: ICD-10-CM

## 2023-10-04 DIAGNOSIS — S72.90XA UNSPECIFIED FRACTURE OF UNSPECIFIED FEMUR, INITIAL ENCOUNTER FOR CLOSED FRACTURE: Chronic | ICD-10-CM

## 2023-10-04 DIAGNOSIS — Z98.890 OTHER SPECIFIED POSTPROCEDURAL STATES: Chronic | ICD-10-CM

## 2023-10-04 LAB — BLD GP AB SCN SERPL QL: SIGNIFICANT CHANGE UP

## 2023-10-04 PROCEDURE — 86923 COMPATIBILITY TEST ELECTRIC: CPT

## 2023-10-04 PROCEDURE — 86900 BLOOD TYPING SEROLOGIC ABO: CPT

## 2023-10-04 PROCEDURE — 86901 BLOOD TYPING SEROLOGIC RH(D): CPT

## 2023-10-04 PROCEDURE — 86850 RBC ANTIBODY SCREEN: CPT

## 2023-10-05 ENCOUNTER — OUTPATIENT (OUTPATIENT)
Dept: OUTPATIENT SERVICES | Facility: HOSPITAL | Age: 88
LOS: 1 days | Discharge: HOME | End: 2023-10-05
Payer: MEDICARE

## 2023-10-05 VITALS
HEART RATE: 68 BPM | OXYGEN SATURATION: 99 % | DIASTOLIC BLOOD PRESSURE: 46 MMHG | SYSTOLIC BLOOD PRESSURE: 114 MMHG | RESPIRATION RATE: 19 BRPM | TEMPERATURE: 98 F

## 2023-10-05 VITALS
DIASTOLIC BLOOD PRESSURE: 48 MMHG | HEART RATE: 70 BPM | SYSTOLIC BLOOD PRESSURE: 141 MMHG | OXYGEN SATURATION: 100 % | TEMPERATURE: 99 F | RESPIRATION RATE: 17 BRPM

## 2023-10-05 DIAGNOSIS — D64.9 ANEMIA, UNSPECIFIED: ICD-10-CM

## 2023-10-05 DIAGNOSIS — Z98.890 OTHER SPECIFIED POSTPROCEDURAL STATES: Chronic | ICD-10-CM

## 2023-10-05 DIAGNOSIS — S72.90XA UNSPECIFIED FRACTURE OF UNSPECIFIED FEMUR, INITIAL ENCOUNTER FOR CLOSED FRACTURE: Chronic | ICD-10-CM

## 2023-10-05 PROCEDURE — 36430 TRANSFUSION BLD/BLD COMPNT: CPT

## 2023-10-05 PROCEDURE — P9016: CPT

## 2023-10-05 RX ORDER — DIPHENHYDRAMINE HCL 50 MG
25 CAPSULE ORAL ONCE
Refills: 0 | Status: COMPLETED | OUTPATIENT
Start: 2023-10-05 | End: 2023-10-05

## 2023-10-05 RX ORDER — ACETAMINOPHEN 500 MG
650 TABLET ORAL ONCE
Refills: 0 | Status: COMPLETED | OUTPATIENT
Start: 2023-10-05 | End: 2023-10-05

## 2023-10-05 RX ADMIN — Medication 25 MILLIGRAM(S): at 08:34

## 2023-10-05 RX ADMIN — Medication 650 MILLIGRAM(S): at 10:16

## 2023-10-05 RX ADMIN — Medication 650 MILLIGRAM(S): at 08:34

## 2023-10-26 ENCOUNTER — EMERGENCY (EMERGENCY)
Facility: HOSPITAL | Age: 88
LOS: 0 days | Discharge: HOME | End: 2023-10-26
Attending: EMERGENCY MEDICINE
Payer: MEDICARE

## 2023-10-26 VITALS
DIASTOLIC BLOOD PRESSURE: 53 MMHG | SYSTOLIC BLOOD PRESSURE: 135 MMHG | TEMPERATURE: 98 F | RESPIRATION RATE: 16 BRPM | OXYGEN SATURATION: 97 % | HEART RATE: 73 BPM

## 2023-10-26 VITALS
DIASTOLIC BLOOD PRESSURE: 44 MMHG | SYSTOLIC BLOOD PRESSURE: 132 MMHG | HEART RATE: 81 BPM | RESPIRATION RATE: 20 BRPM | HEIGHT: 56 IN | OXYGEN SATURATION: 97 % | WEIGHT: 108.03 LBS | TEMPERATURE: 98 F

## 2023-10-26 DIAGNOSIS — I10 ESSENTIAL (PRIMARY) HYPERTENSION: ICD-10-CM

## 2023-10-26 DIAGNOSIS — R53.83 OTHER FATIGUE: ICD-10-CM

## 2023-10-26 DIAGNOSIS — Z98.890 OTHER SPECIFIED POSTPROCEDURAL STATES: Chronic | ICD-10-CM

## 2023-10-26 DIAGNOSIS — H35.30 UNSPECIFIED MACULAR DEGENERATION: ICD-10-CM

## 2023-10-26 DIAGNOSIS — D64.9 ANEMIA, UNSPECIFIED: ICD-10-CM

## 2023-10-26 DIAGNOSIS — E03.9 HYPOTHYROIDISM, UNSPECIFIED: ICD-10-CM

## 2023-10-26 DIAGNOSIS — E78.5 HYPERLIPIDEMIA, UNSPECIFIED: ICD-10-CM

## 2023-10-26 DIAGNOSIS — R42 DIZZINESS AND GIDDINESS: ICD-10-CM

## 2023-10-26 DIAGNOSIS — Z79.82 LONG TERM (CURRENT) USE OF ASPIRIN: ICD-10-CM

## 2023-10-26 DIAGNOSIS — S72.90XA UNSPECIFIED FRACTURE OF UNSPECIFIED FEMUR, INITIAL ENCOUNTER FOR CLOSED FRACTURE: Chronic | ICD-10-CM

## 2023-10-26 LAB
ALBUMIN SERPL ELPH-MCNC: 2.8 G/DL — LOW (ref 3.3–5)
ALBUMIN SERPL ELPH-MCNC: 2.8 G/DL — LOW (ref 3.3–5)
ALP SERPL-CCNC: 44 U/L — SIGNIFICANT CHANGE UP (ref 40–120)
ALP SERPL-CCNC: 44 U/L — SIGNIFICANT CHANGE UP (ref 40–120)
ALT FLD-CCNC: 28 U/L — SIGNIFICANT CHANGE UP (ref 12–78)
ALT FLD-CCNC: 28 U/L — SIGNIFICANT CHANGE UP (ref 12–78)
ANION GAP SERPL CALC-SCNC: 6 MMOL/L — SIGNIFICANT CHANGE UP (ref 5–17)
ANION GAP SERPL CALC-SCNC: 6 MMOL/L — SIGNIFICANT CHANGE UP (ref 5–17)
ANISOCYTOSIS BLD QL: SIGNIFICANT CHANGE UP
ANISOCYTOSIS BLD QL: SIGNIFICANT CHANGE UP
APTT BLD: 40.1 SEC — HIGH (ref 24.5–35.6)
APTT BLD: 40.1 SEC — HIGH (ref 24.5–35.6)
AST SERPL-CCNC: 18 U/L — SIGNIFICANT CHANGE UP (ref 15–37)
AST SERPL-CCNC: 18 U/L — SIGNIFICANT CHANGE UP (ref 15–37)
BASOPHILS # BLD AUTO: 0.03 K/UL — SIGNIFICANT CHANGE UP (ref 0–0.2)
BASOPHILS # BLD AUTO: 0.03 K/UL — SIGNIFICANT CHANGE UP (ref 0–0.2)
BASOPHILS NFR BLD AUTO: 1.1 % — SIGNIFICANT CHANGE UP (ref 0–2)
BASOPHILS NFR BLD AUTO: 1.1 % — SIGNIFICANT CHANGE UP (ref 0–2)
BILIRUB SERPL-MCNC: 0.3 MG/DL — SIGNIFICANT CHANGE UP (ref 0.2–1.2)
BILIRUB SERPL-MCNC: 0.3 MG/DL — SIGNIFICANT CHANGE UP (ref 0.2–1.2)
BLD GP AB SCN SERPL QL: SIGNIFICANT CHANGE UP
BLD GP AB SCN SERPL QL: SIGNIFICANT CHANGE UP
BUN SERPL-MCNC: 35 MG/DL — HIGH (ref 7–23)
BUN SERPL-MCNC: 35 MG/DL — HIGH (ref 7–23)
CALCIUM SERPL-MCNC: 7.9 MG/DL — LOW (ref 8.5–10.1)
CALCIUM SERPL-MCNC: 7.9 MG/DL — LOW (ref 8.5–10.1)
CHLORIDE SERPL-SCNC: 108 MMOL/L — SIGNIFICANT CHANGE UP (ref 96–108)
CHLORIDE SERPL-SCNC: 108 MMOL/L — SIGNIFICANT CHANGE UP (ref 96–108)
CO2 SERPL-SCNC: 25 MMOL/L — SIGNIFICANT CHANGE UP (ref 22–31)
CO2 SERPL-SCNC: 25 MMOL/L — SIGNIFICANT CHANGE UP (ref 22–31)
CREAT SERPL-MCNC: 0.96 MG/DL — SIGNIFICANT CHANGE UP (ref 0.5–1.3)
CREAT SERPL-MCNC: 0.96 MG/DL — SIGNIFICANT CHANGE UP (ref 0.5–1.3)
EGFR: 58 ML/MIN/1.73M2 — LOW
EGFR: 58 ML/MIN/1.73M2 — LOW
EOSINOPHIL # BLD AUTO: 0.2 K/UL — SIGNIFICANT CHANGE UP (ref 0–0.5)
EOSINOPHIL # BLD AUTO: 0.2 K/UL — SIGNIFICANT CHANGE UP (ref 0–0.5)
EOSINOPHIL NFR BLD AUTO: 7 % — HIGH (ref 0–6)
EOSINOPHIL NFR BLD AUTO: 7 % — HIGH (ref 0–6)
GLUCOSE SERPL-MCNC: 222 MG/DL — HIGH (ref 70–99)
GLUCOSE SERPL-MCNC: 222 MG/DL — HIGH (ref 70–99)
HCT VFR BLD CALC: 22.1 % — LOW (ref 34.5–45)
HCT VFR BLD CALC: 22.1 % — LOW (ref 34.5–45)
HGB BLD-MCNC: 7.3 G/DL — LOW (ref 11.5–15.5)
HGB BLD-MCNC: 7.3 G/DL — LOW (ref 11.5–15.5)
HYPOCHROMIA BLD QL: SLIGHT — SIGNIFICANT CHANGE UP
HYPOCHROMIA BLD QL: SLIGHT — SIGNIFICANT CHANGE UP
IMM GRANULOCYTES NFR BLD AUTO: 1.8 % — HIGH (ref 0–0.9)
IMM GRANULOCYTES NFR BLD AUTO: 1.8 % — HIGH (ref 0–0.9)
INR BLD: 1.17 RATIO — SIGNIFICANT CHANGE UP (ref 0.85–1.18)
INR BLD: 1.17 RATIO — SIGNIFICANT CHANGE UP (ref 0.85–1.18)
LYMPHOCYTES # BLD AUTO: 1.03 K/UL — SIGNIFICANT CHANGE UP (ref 1–3.3)
LYMPHOCYTES # BLD AUTO: 1.03 K/UL — SIGNIFICANT CHANGE UP (ref 1–3.3)
LYMPHOCYTES # BLD AUTO: 36.1 % — SIGNIFICANT CHANGE UP (ref 13–44)
LYMPHOCYTES # BLD AUTO: 36.1 % — SIGNIFICANT CHANGE UP (ref 13–44)
MANUAL SMEAR VERIFICATION: SIGNIFICANT CHANGE UP
MANUAL SMEAR VERIFICATION: SIGNIFICANT CHANGE UP
MCHC RBC-ENTMCNC: 29.2 PG — SIGNIFICANT CHANGE UP (ref 27–34)
MCHC RBC-ENTMCNC: 29.2 PG — SIGNIFICANT CHANGE UP (ref 27–34)
MCHC RBC-ENTMCNC: 33 GM/DL — SIGNIFICANT CHANGE UP (ref 32–36)
MCHC RBC-ENTMCNC: 33 GM/DL — SIGNIFICANT CHANGE UP (ref 32–36)
MCV RBC AUTO: 88.4 FL — SIGNIFICANT CHANGE UP (ref 80–100)
MCV RBC AUTO: 88.4 FL — SIGNIFICANT CHANGE UP (ref 80–100)
MICROCYTES BLD QL: SIGNIFICANT CHANGE UP
MICROCYTES BLD QL: SIGNIFICANT CHANGE UP
MONOCYTES # BLD AUTO: 0.65 K/UL — SIGNIFICANT CHANGE UP (ref 0–0.9)
MONOCYTES # BLD AUTO: 0.65 K/UL — SIGNIFICANT CHANGE UP (ref 0–0.9)
MONOCYTES NFR BLD AUTO: 22.8 % — HIGH (ref 2–14)
MONOCYTES NFR BLD AUTO: 22.8 % — HIGH (ref 2–14)
NEUTROPHILS # BLD AUTO: 0.89 K/UL — LOW (ref 1.8–7.4)
NEUTROPHILS # BLD AUTO: 0.89 K/UL — LOW (ref 1.8–7.4)
NEUTROPHILS NFR BLD AUTO: 31.2 % — LOW (ref 43–77)
NEUTROPHILS NFR BLD AUTO: 31.2 % — LOW (ref 43–77)
PLAT MORPH BLD: NORMAL — SIGNIFICANT CHANGE UP
PLAT MORPH BLD: NORMAL — SIGNIFICANT CHANGE UP
PLATELET # BLD AUTO: 234 K/UL — SIGNIFICANT CHANGE UP (ref 150–400)
PLATELET # BLD AUTO: 234 K/UL — SIGNIFICANT CHANGE UP (ref 150–400)
POIKILOCYTOSIS BLD QL AUTO: SLIGHT — SIGNIFICANT CHANGE UP
POIKILOCYTOSIS BLD QL AUTO: SLIGHT — SIGNIFICANT CHANGE UP
POTASSIUM SERPL-MCNC: 4 MMOL/L — SIGNIFICANT CHANGE UP (ref 3.5–5.3)
POTASSIUM SERPL-MCNC: 4 MMOL/L — SIGNIFICANT CHANGE UP (ref 3.5–5.3)
POTASSIUM SERPL-SCNC: 4 MMOL/L — SIGNIFICANT CHANGE UP (ref 3.5–5.3)
POTASSIUM SERPL-SCNC: 4 MMOL/L — SIGNIFICANT CHANGE UP (ref 3.5–5.3)
PROT SERPL-MCNC: 6.2 GM/DL — SIGNIFICANT CHANGE UP (ref 6–8.3)
PROT SERPL-MCNC: 6.2 GM/DL — SIGNIFICANT CHANGE UP (ref 6–8.3)
PROTHROM AB SERPL-ACNC: 13.2 SEC — HIGH (ref 9.5–13)
PROTHROM AB SERPL-ACNC: 13.2 SEC — HIGH (ref 9.5–13)
RBC # BLD: 2.5 M/UL — LOW (ref 3.8–5.2)
RBC # BLD: 2.5 M/UL — LOW (ref 3.8–5.2)
RBC # FLD: 14.2 % — SIGNIFICANT CHANGE UP (ref 10.3–14.5)
RBC # FLD: 14.2 % — SIGNIFICANT CHANGE UP (ref 10.3–14.5)
RBC BLD AUTO: ABNORMAL
RBC BLD AUTO: ABNORMAL
SODIUM SERPL-SCNC: 139 MMOL/L — SIGNIFICANT CHANGE UP (ref 135–145)
SODIUM SERPL-SCNC: 139 MMOL/L — SIGNIFICANT CHANGE UP (ref 135–145)
WBC # BLD: 2.85 K/UL — LOW (ref 3.8–10.5)
WBC # BLD: 2.85 K/UL — LOW (ref 3.8–10.5)
WBC # FLD AUTO: 2.85 K/UL — LOW (ref 3.8–10.5)
WBC # FLD AUTO: 2.85 K/UL — LOW (ref 3.8–10.5)

## 2023-10-26 PROCEDURE — 86850 RBC ANTIBODY SCREEN: CPT

## 2023-10-26 PROCEDURE — 71045 X-RAY EXAM CHEST 1 VIEW: CPT

## 2023-10-26 PROCEDURE — 86901 BLOOD TYPING SEROLOGIC RH(D): CPT

## 2023-10-26 PROCEDURE — 96374 THER/PROPH/DIAG INJ IV PUSH: CPT

## 2023-10-26 PROCEDURE — 86923 COMPATIBILITY TEST ELECTRIC: CPT

## 2023-10-26 PROCEDURE — 99285 EMERGENCY DEPT VISIT HI MDM: CPT | Mod: 25

## 2023-10-26 PROCEDURE — 85025 COMPLETE CBC W/AUTO DIFF WBC: CPT

## 2023-10-26 PROCEDURE — 85610 PROTHROMBIN TIME: CPT

## 2023-10-26 PROCEDURE — 36415 COLL VENOUS BLD VENIPUNCTURE: CPT

## 2023-10-26 PROCEDURE — 93005 ELECTROCARDIOGRAM TRACING: CPT

## 2023-10-26 PROCEDURE — 71045 X-RAY EXAM CHEST 1 VIEW: CPT | Mod: 26

## 2023-10-26 PROCEDURE — P9040: CPT

## 2023-10-26 PROCEDURE — 80053 COMPREHEN METABOLIC PANEL: CPT

## 2023-10-26 PROCEDURE — 36430 TRANSFUSION BLD/BLD COMPNT: CPT

## 2023-10-26 PROCEDURE — 93010 ELECTROCARDIOGRAM REPORT: CPT

## 2023-10-26 PROCEDURE — 99285 EMERGENCY DEPT VISIT HI MDM: CPT

## 2023-10-26 PROCEDURE — 85730 THROMBOPLASTIN TIME PARTIAL: CPT

## 2023-10-26 PROCEDURE — 86900 BLOOD TYPING SEROLOGIC ABO: CPT

## 2023-10-26 RX ORDER — ACETAMINOPHEN 500 MG
650 TABLET ORAL ONCE
Refills: 0 | Status: COMPLETED | OUTPATIENT
Start: 2023-10-26 | End: 2023-10-26

## 2023-10-26 RX ORDER — DIPHENHYDRAMINE HCL 50 MG
25 CAPSULE ORAL ONCE
Refills: 0 | Status: COMPLETED | OUTPATIENT
Start: 2023-10-26 | End: 2023-10-26

## 2023-10-26 RX ADMIN — Medication 25 MILLIGRAM(S): at 09:58

## 2023-10-26 RX ADMIN — Medication 650 MILLIGRAM(S): at 09:57

## 2023-10-26 NOTE — ED ADULT NURSE REASSESSMENT NOTE - NS ED NURSE REASSESS COMMENT FT1
Dr. Gaines notified on temp change to 99.6 during PRBCs transfusion. Discussed continuing transfusion at slow rate. Dr. Gaines notified on temp change to 99.6 during PRBCs transfusion. Discussed continuing transfusion at slow rate. Pt denies shortness of breath, itchiness, CP, or any other complaints

## 2023-10-26 NOTE — ED PROVIDER NOTE - PATIENT PORTAL LINK FT
You can access the FollowMyHealth Patient Portal offered by [facility] by registering at the following website: [email or web address]. By joining [employer]’s FollowMyHealth portal, you will also be able to view your health information using other applications (apps) compatible with our system.

## 2023-10-26 NOTE — ED PROVIDER NOTE - OBJECTIVE STATEMENT
88-year-old patient with PMHx of MDS (Dr. Holliday = onc)  on chemo, HTN, HLD, anemia,  presents to the ER feeling weak and tired.  Patient states 2 days ago had blood work showing hemoglobin of 7.1.  Patient was scheduled for blood transfusion on October 30.  Patient with increasing shortness of breath with exertion and legs feeling heavy and was instructed to go to the ER for evaluation.  Denies nausea/vomiting/diarrhea.  Denies blood in the stools.

## 2023-10-26 NOTE — ED ADULT NURSE NOTE - OBJECTIVE STATEMENT
Pt presents to ED with c/o dizziness. PMH of anemia last hemoglobin level was 7.1 on Tuesday outpatient. Pt woke up this morning with dizziness, lightheadedness, shortness of breath, and general weakness. Denies, HA, N/V/D, visual changes, and bloody stools. Breath sounds clear bilaterally, O2 sat 98 on RA, does not appear in any respiratory distress. Labs obtained, VSS, no other complaints at this time. Friend Don at bedside Pt presents to ED with c/o dizziness. PMH of anemia last hemoglobin level was 7.1 on Tuesday outpatient. Pt woke up this morning with dizziness, lightheadedness, shortness of breath, and general weakness. Denies, HA, N/V/D, visual changes, and bloody stools. Breath sounds clear bilaterally, O2 sat 98 on RA, does not appear in any respiratory distress. Labs obtained, VSS, no other concerns at this time. Friend Don at bedside

## 2023-10-26 NOTE — ED PROVIDER NOTE - PROGRESS NOTE DETAILS
Attending Gaines, pt is feeling better after first unit of blood.  Will transfuse second unit and likely discharge. sign out received from dr ramirez pending prbcs. pt s/p 2 units. feels well. requesting dc home. MD LUIS F

## 2023-10-26 NOTE — ED PROVIDER NOTE - NSFOLLOWUPINSTRUCTIONS_ED_ALL_ED_FT
Please call and follow up with your Dr. Kirk in 3 - 5 days days.    Return to the Emergency Department for worsening or persistent symptoms, and/or ANY NEW OR CONCERNING SYMPTOMS. If you have issues obtaining follow up, please call: [phone] or [phone]  to obtain a doctor or specialist who takes your insurance in your area.        Anemia    WHAT YOU NEED TO KNOW:    Anemia is a low number of red blood cells or a low amount of hemoglobin in your red blood cells. Hemoglobin is a protein that helps carry oxygen throughout your body. Red blood cells use iron to create hemoglobin. Anemia may develop if your body does not have enough iron. It may also develop if your body does not make enough red blood cells or they die faster than your body can make them.    DISCHARGE INSTRUCTIONS:    Call your local emergency number ([phone] in the ), or have someone call if:    You lose consciousness.    You have severe chest pain.  Return to the emergency department if:    You have dark or bloody bowel movements.    Call your doctor if:    Your symptoms are worse, even after treatment.    You have questions or concerns about your condition or care.  Medicines:    Iron or folic acid supplements help increase your red blood cell and hemoglobin levels.    Vitamin B12 injections may help boost your red blood cell level and decrease your symptoms. Ask your healthcare provider how to inject B12.    Take your medicine as directed. Contact your healthcare provider if you think your medicine is not helping or if you have side effects. Tell your provider if you are allergic to any medicine. Keep a list of the medicines, vitamins, and herbs you take. Include the amounts, and when and why you take them. Bring the list or the pill bottles to follow-up visits. Carry your medicine list with you in case of an emergency.  Prevent anemia: Eat healthy foods rich in iron and vitamin C. Nuts, meat, dark leafy green vegetables, and beans are high in iron and protein. Vitamin C helps your body absorb iron. Foods rich in vitamin C include oranges and other citrus fruits. Ask your healthcare provider for a list of other foods that are high in iron or vitamin C. Ask if you need to be on a special diet.  Sources of Iron  Sources of Vitamin C    Follow up with your doctor as directed: Write down your questions so you remember to ask them during your visits.

## 2023-10-26 NOTE — ED PROVIDER NOTE - CLINICAL SUMMARY MEDICAL DECISION MAKING FREE TEXT BOX
88-year-old patient presents emergency department for shortness of breath and weakness.  Plan check labs, chest x-ray, likely blood transfusion.

## 2023-10-26 NOTE — ED ADULT NURSE NOTE - CAS EDP DISCH TYPE
Spoke to Jaquelin from [facility]. They are able to accept patient on 8/28 d/t covid isolation    1525 received call from Rose Nicholson from [facility]. They are able to accept.  Notified her that patient may be ready for discharge tomorrow pending ID clearance Home

## 2023-10-26 NOTE — ED ADULT TRIAGE NOTE - CHIEF COMPLAINT QUOTE
patient c/o dizziness, SOB.  patient with hx anemia, is scheduled for transfusion on 10/30 but is having SOB and dizziness.  last hgb was 7.1.

## 2023-10-26 NOTE — ED ADULT NURSE NOTE - NSFALLHARMRISKINTERV_ED_ALL_ED
Assistance OOB with selected safe patient handling equipment if applicable/Assistance with ambulation/Communicate risk of Fall with Harm to all staff, patient, and family/Encourage patient to sit up slowly, dangle for a short time, stand at bedside before walking/Monitor gait and stability/Orthostatic vital signs/Provide visual cue: red socks, yellow wristband, yellow gown, etc/Reinforce activity limits and safety measures with patient and family/Bed in lowest position, wheels locked, appropriate side rails in place/Call bell, personal items and telephone in reach/Instruct patient to call for assistance before getting out of bed/chair/stretcher/Non-slip footwear applied when patient is off stretcher/[place] to call system/Physically safe environment - no spills, clutter or unnecessary equipment/Purposeful Proactive Rounding/Room/bathroom lighting operational, light cord in reach

## 2023-11-13 ENCOUNTER — OUTPATIENT (OUTPATIENT)
Dept: OUTPATIENT SERVICES | Facility: HOSPITAL | Age: 88
LOS: 1 days | Discharge: HOME | End: 2023-11-13
Payer: MEDICARE

## 2023-11-13 DIAGNOSIS — N18.6 END STAGE RENAL DISEASE: ICD-10-CM

## 2023-11-13 LAB
BLD GP AB SCN SERPL QL: SIGNIFICANT CHANGE UP
BLD GP AB SCN SERPL QL: SIGNIFICANT CHANGE UP

## 2023-11-13 PROCEDURE — 36415 COLL VENOUS BLD VENIPUNCTURE: CPT

## 2023-11-13 PROCEDURE — 86923 COMPATIBILITY TEST ELECTRIC: CPT

## 2023-11-13 PROCEDURE — 86901 BLOOD TYPING SEROLOGIC RH(D): CPT

## 2023-11-13 PROCEDURE — 86900 BLOOD TYPING SEROLOGIC ABO: CPT

## 2023-11-13 PROCEDURE — 86850 RBC ANTIBODY SCREEN: CPT

## 2023-11-14 ENCOUNTER — OUTPATIENT (OUTPATIENT)
Dept: OUTPATIENT SERVICES | Facility: HOSPITAL | Age: 88
LOS: 1 days | Discharge: HOME | End: 2023-11-14
Payer: MEDICARE

## 2023-11-14 VITALS
OXYGEN SATURATION: 97 % | DIASTOLIC BLOOD PRESSURE: 52 MMHG | SYSTOLIC BLOOD PRESSURE: 132 MMHG | HEART RATE: 73 BPM | TEMPERATURE: 98 F | RESPIRATION RATE: 17 BRPM

## 2023-11-14 VITALS
TEMPERATURE: 98 F | HEART RATE: 71 BPM | DIASTOLIC BLOOD PRESSURE: 41 MMHG | RESPIRATION RATE: 20 BRPM | SYSTOLIC BLOOD PRESSURE: 125 MMHG | OXYGEN SATURATION: 100 %

## 2023-11-14 DIAGNOSIS — N18.6 END STAGE RENAL DISEASE: ICD-10-CM

## 2023-11-14 DIAGNOSIS — Z98.890 OTHER SPECIFIED POSTPROCEDURAL STATES: Chronic | ICD-10-CM

## 2023-11-14 DIAGNOSIS — S72.90XA UNSPECIFIED FRACTURE OF UNSPECIFIED FEMUR, INITIAL ENCOUNTER FOR CLOSED FRACTURE: Chronic | ICD-10-CM

## 2023-11-14 PROCEDURE — P9016: CPT

## 2023-11-14 PROCEDURE — 36430 TRANSFUSION BLD/BLD COMPNT: CPT

## 2023-11-14 RX ORDER — DIPHENHYDRAMINE HCL 50 MG
25 CAPSULE ORAL ONCE
Refills: 0 | Status: COMPLETED | OUTPATIENT
Start: 2023-11-14 | End: 2023-11-14

## 2023-11-14 RX ORDER — ACETAMINOPHEN 500 MG
650 TABLET ORAL ONCE
Refills: 0 | Status: COMPLETED | OUTPATIENT
Start: 2023-11-14 | End: 2023-11-14

## 2023-11-14 RX ADMIN — Medication 650 MILLIGRAM(S): at 10:18

## 2023-11-14 RX ADMIN — Medication 650 MILLIGRAM(S): at 07:32

## 2023-11-14 RX ADMIN — Medication 25 MILLIGRAM(S): at 07:32

## 2023-11-16 NOTE — PROGRESS NOTE ADULT - SUBJECTIVE AND OBJECTIVE BOX
85y Female presents with Difficulty ambulating since fall 2 weeks ago/ pubic rami fracture seen in office by Dr. Salinas. Patient report she fell 2 weeks ago / was seen in Dr. Cisneros office who identified left pubic ramus fracture and recommended WBAT at that time. She now complains of pain in her right groin and left leg posteriorly localized over the hamstring. Denies numbness/tingling in the affected extremity. Denies head strike/LOC/other orthopedic injuries at this time.  Patient ambulates without assistance at baseline but with walker for past 2 weeks.    PAST MEDICAL & SURGICAL HISTORY:  Cataract  left eye    Macular degeneration  right    Hypothyroidism    HLD (hyperlipidemia)    HTN (hypertension)    Acute promyelocytic leukemia    Anemia    Fracture of femur  right ac right    H/O lumpectomy  Left side in 2007, performed by Dr. Samuels at       Home Medications:  acetaminophen 325 mg oral tablet: 2 tab(s) orally every 4 hours, As Needed for mild pain (27 Oct 2020 12:48)  aspirin 81 mg oral tablet: 1 tab(s) orally once a day (27 Oct 2020 12:48)  calcium-vitamin D 500 mg-200 intl units (5 mcg) oral tablet: 1 tab(s) orally once a day (27 Oct 2020 12:48)  cholecalciferol oral tablet: 1000 unit(s) orally once a day (27 Oct 2020 12:48)  fenofibrate 160 mg oral tablet: 1 tab(s) orally once a day (27 Oct 2020 12:48)  levothyroxine 50 mcg (0.05 mg) oral tablet: 1 tab(s) orally once a day (27 Oct 2020 12:48)  losartan 50 mg oral tablet: 1 tab(s) orally once a day (in the evening) (27 Oct 2020 12:48)  ofloxacin 0.3% ophthalmic solution: 1 drop(s) to each affected eye every 5 minutes (27 Oct 2020 14:14)  rosuvastatin 20 mg oral tablet: 1 tab(s) orally once a day (27 Oct 2020 12:48)  sertraline 100 mg oral tablet: 1 tab(s) orally once a day (27 Oct 2020 12:48)  traMADol 50 mg oral tablet: 25 milligram(s) orally every 6 hours, As Needed for moderate pain, 50mg oral every 6 hours as needed for severe pain (27 Oct 2020 12:48)    Allergies    No Known Allergies    Intolerances                              10.8   5.12  )-----------( 528      ( 03 Dec 2020 16:40 )             34.1     12-03    138  |  105  |  28<H>  ----------------------------<  101<H>  4.2   |  28  |  0.74    Ca    9.1      03 Dec 2020 16:40      PT/INR - ( 03 Dec 2020 16:40 )   PT: 13.6 sec;   INR: 1.18 ratio         PTT - ( 03 Dec 2020 16:40 )  PTT:36.7 sec        Vital Signs Last 24 Hrs  T(C): 37.1 (03 Dec 2020 19:57), Max: 37.8 (03 Dec 2020 15:34)  T(F): 98.7 (03 Dec 2020 19:57), Max: 100 (03 Dec 2020 15:34)  HR: 81 (03 Dec 2020 19:57) (80 - 85)  BP: 139/62 (03 Dec 2020 19:57) (118/54 - 141/54)  BP(mean): 81 (03 Dec 2020 19:57) (68 - 81)  RR: 20 (03 Dec 2020 19:57) (17 - 20)  SpO2: 97% (03 Dec 2020 19:57) (95% - 99%)    PHYSICAL EXAM  General: NAD, Awake and Alert    RLE:  Skin well healed incision from previous subtrochanteric fx fixation  No pain with palpation of bony prominences from hip to ankle  No pain with Log roll / axial roll  pain with palpation of hamstrings / spasm and tightness of hamstring identified   Able to straight leg raise with some pain radiating to groin  +sensation L2-S1  +dorsiflexion/plantarflexion of ankle/hallux  +dorsalis pedis pulse  Soft compartments, - calf tenderness      LLE:  skin intact  TTP over groin  NTTP throughout rest of LLE  Pain with straight leg raise radiating to groin but no difficulty   no pain with log roll / axial load  +sensation L2-S1  +dorsiflexion/plantarflexion of ankle/hallux  +dorsalis pedis pulse  Soft compartments, - calf tenderness        Secondary Exam: Benign, Skin intact, NTTP along axial spine, SILT throughout, motor grossly intact throughout, no other orthopedic injuries at this time, compartments soft and compressible        IMAGING:  XR : Hardware appears well align. No acute fracture noted, subacute left pubic rami fracture noted  CT : no acute fracture, healing subacute R sacral ala / L Pubis. No evidence of hardware complication. Possible piriformis syndrome        Bilobed Transposition Flap Text: The defect edges were debeveled with a #15 scalpel blade.  Given the location of the defect and the proximity to free margins a bilobed transposition flap was deemed most appropriate.  Using a sterile surgical marker, an appropriate bilobe flap drawn around the defect.    The area thus outlined was incised deep to adipose tissue with a #15 scalpel blade.  The skin margins were undermined to an appropriate distance in all directions utilizing iris scissors.

## 2023-11-30 ENCOUNTER — OUTPATIENT (OUTPATIENT)
Dept: OUTPATIENT SERVICES | Facility: HOSPITAL | Age: 88
LOS: 1 days | Discharge: HOME | End: 2023-11-30
Payer: MEDICARE

## 2023-11-30 DIAGNOSIS — Z98.890 OTHER SPECIFIED POSTPROCEDURAL STATES: Chronic | ICD-10-CM

## 2023-11-30 DIAGNOSIS — N18.6 END STAGE RENAL DISEASE: ICD-10-CM

## 2023-11-30 DIAGNOSIS — S72.90XA UNSPECIFIED FRACTURE OF UNSPECIFIED FEMUR, INITIAL ENCOUNTER FOR CLOSED FRACTURE: Chronic | ICD-10-CM

## 2023-11-30 LAB
BLD GP AB SCN SERPL QL: SIGNIFICANT CHANGE UP
BLD GP AB SCN SERPL QL: SIGNIFICANT CHANGE UP

## 2023-11-30 PROCEDURE — 86900 BLOOD TYPING SEROLOGIC ABO: CPT

## 2023-11-30 PROCEDURE — 36415 COLL VENOUS BLD VENIPUNCTURE: CPT

## 2023-11-30 PROCEDURE — 86901 BLOOD TYPING SEROLOGIC RH(D): CPT

## 2023-11-30 PROCEDURE — 86923 COMPATIBILITY TEST ELECTRIC: CPT

## 2023-11-30 PROCEDURE — 86850 RBC ANTIBODY SCREEN: CPT

## 2023-12-01 ENCOUNTER — OUTPATIENT (OUTPATIENT)
Dept: OUTPATIENT SERVICES | Facility: HOSPITAL | Age: 88
LOS: 1 days | Discharge: HOME | End: 2023-12-01
Payer: MEDICARE

## 2023-12-01 VITALS
DIASTOLIC BLOOD PRESSURE: 61 MMHG | HEART RATE: 83 BPM | RESPIRATION RATE: 19 BRPM | OXYGEN SATURATION: 100 % | TEMPERATURE: 97 F | SYSTOLIC BLOOD PRESSURE: 129 MMHG

## 2023-12-01 VITALS
TEMPERATURE: 99 F | OXYGEN SATURATION: 96 % | HEART RATE: 81 BPM | DIASTOLIC BLOOD PRESSURE: 124 MMHG | RESPIRATION RATE: 17 BRPM | SYSTOLIC BLOOD PRESSURE: 150 MMHG

## 2023-12-01 DIAGNOSIS — S72.90XA UNSPECIFIED FRACTURE OF UNSPECIFIED FEMUR, INITIAL ENCOUNTER FOR CLOSED FRACTURE: Chronic | ICD-10-CM

## 2023-12-01 DIAGNOSIS — Z98.890 OTHER SPECIFIED POSTPROCEDURAL STATES: Chronic | ICD-10-CM

## 2023-12-01 DIAGNOSIS — N18.6 END STAGE RENAL DISEASE: ICD-10-CM

## 2023-12-01 PROCEDURE — P9040: CPT

## 2023-12-01 PROCEDURE — 36430 TRANSFUSION BLD/BLD COMPNT: CPT

## 2023-12-01 RX ORDER — ACETAMINOPHEN 500 MG
650 TABLET ORAL ONCE
Refills: 0 | Status: COMPLETED | OUTPATIENT
Start: 2023-12-01 | End: 2023-12-01

## 2023-12-01 RX ORDER — DIPHENHYDRAMINE HCL 50 MG
25 CAPSULE ORAL ONCE
Refills: 0 | Status: COMPLETED | OUTPATIENT
Start: 2023-12-01 | End: 2023-12-01

## 2023-12-01 RX ADMIN — Medication 25 MILLIGRAM(S): at 07:30

## 2023-12-01 RX ADMIN — Medication 650 MILLIGRAM(S): at 09:30

## 2023-12-01 RX ADMIN — Medication 650 MILLIGRAM(S): at 07:30

## 2023-12-11 NOTE — H&P ADULT - NSHPLANGLIMITEDENGLISH_GEN_A_CORE
No
looked at under a microscope. A biopsy is the only way to tell if you have lung cancer. If the biopsy finds cancer, you and your doctor will have to decide how or whether to treat it. Some lung cancers found on CT scans are harmless and would not have caused a problem if they had not been found through screening. But because doctors can't tell which ones will turn out to be harmless, most will be treated. This means that you may get treatment--including surgery, radiation, or chemotherapy--that you don't need. There is a risk of damage to cells or tissue from being exposed to radiation, including the small amounts used in CTs, X-rays, and other medical tests. Over time, exposure to radiation may cause cancer and other health problems. But in most cases, the risk of getting cancer from being exposed to small amounts of radiation is low. It's not a reason to avoid these tests for most people. What are the benefits of screening? Your scan may be normal (negative). For some people who are at higher risk, screening lowers the chance of dying of lung cancer. How much and how long you smoked helps to determine your risk level. Screening can find some cancers early, when treatment may be more likely to work. What happens after screening? The results of your CT scan will be sent to your doctor. Someone from your care team will explain the results of your scan and answer any questions you may have. If you need any follow-up, he or she will help you understand what to do next. After a lung cancer screening, you can go back to your usual activities right away. A lung cancer screening test can't tell if you have lung cancer. If your results are positive, your doctor can't tell whether an abnormal finding is a harmless nodule, cancer, or something else without doing more tests. What can you do to help prevent lung cancer? Some lung cancers can't be prevented.  But if you smoke, quitting smoking is the best step you can

## 2023-12-20 ENCOUNTER — OUTPATIENT (OUTPATIENT)
Dept: OUTPATIENT SERVICES | Facility: HOSPITAL | Age: 88
LOS: 1 days | Discharge: HOME | End: 2023-12-20
Payer: MEDICARE

## 2023-12-20 DIAGNOSIS — N18.6 END STAGE RENAL DISEASE: ICD-10-CM

## 2023-12-20 DIAGNOSIS — S72.90XA UNSPECIFIED FRACTURE OF UNSPECIFIED FEMUR, INITIAL ENCOUNTER FOR CLOSED FRACTURE: Chronic | ICD-10-CM

## 2023-12-20 LAB
BLD GP AB SCN SERPL QL: SIGNIFICANT CHANGE UP
BLD GP AB SCN SERPL QL: SIGNIFICANT CHANGE UP

## 2023-12-20 PROCEDURE — 86900 BLOOD TYPING SEROLOGIC ABO: CPT

## 2023-12-20 PROCEDURE — 86923 COMPATIBILITY TEST ELECTRIC: CPT

## 2023-12-20 PROCEDURE — 86850 RBC ANTIBODY SCREEN: CPT

## 2023-12-20 PROCEDURE — 36415 COLL VENOUS BLD VENIPUNCTURE: CPT

## 2023-12-20 PROCEDURE — 86901 BLOOD TYPING SEROLOGIC RH(D): CPT

## 2023-12-21 ENCOUNTER — OUTPATIENT (OUTPATIENT)
Dept: OUTPATIENT SERVICES | Facility: HOSPITAL | Age: 88
LOS: 1 days | Discharge: HOME | End: 2023-12-21
Payer: MEDICARE

## 2023-12-21 VITALS
HEART RATE: 82 BPM | OXYGEN SATURATION: 99 % | RESPIRATION RATE: 18 BRPM | TEMPERATURE: 98 F | SYSTOLIC BLOOD PRESSURE: 169 MMHG | DIASTOLIC BLOOD PRESSURE: 74 MMHG

## 2023-12-21 VITALS
HEART RATE: 70 BPM | OXYGEN SATURATION: 99 % | RESPIRATION RATE: 17 BRPM | TEMPERATURE: 98 F | DIASTOLIC BLOOD PRESSURE: 48 MMHG | SYSTOLIC BLOOD PRESSURE: 126 MMHG

## 2023-12-21 DIAGNOSIS — Z98.890 OTHER SPECIFIED POSTPROCEDURAL STATES: Chronic | ICD-10-CM

## 2023-12-21 DIAGNOSIS — N18.6 END STAGE RENAL DISEASE: ICD-10-CM

## 2023-12-21 DIAGNOSIS — S72.90XA UNSPECIFIED FRACTURE OF UNSPECIFIED FEMUR, INITIAL ENCOUNTER FOR CLOSED FRACTURE: Chronic | ICD-10-CM

## 2023-12-21 PROCEDURE — P9016: CPT

## 2023-12-21 PROCEDURE — 36430 TRANSFUSION BLD/BLD COMPNT: CPT

## 2023-12-21 RX ORDER — ACETAMINOPHEN 500 MG
650 TABLET ORAL ONCE
Refills: 0 | Status: COMPLETED | OUTPATIENT
Start: 2023-12-21 | End: 2023-12-21

## 2023-12-21 RX ORDER — DIPHENHYDRAMINE HCL 50 MG
25 CAPSULE ORAL ONCE
Refills: 0 | Status: COMPLETED | OUTPATIENT
Start: 2023-12-21 | End: 2023-12-21

## 2023-12-21 RX ADMIN — Medication 25 MILLIGRAM(S): at 07:32

## 2023-12-21 RX ADMIN — Medication 650 MILLIGRAM(S): at 07:32

## 2024-01-08 ENCOUNTER — OUTPATIENT (OUTPATIENT)
Dept: OUTPATIENT SERVICES | Facility: HOSPITAL | Age: 89
LOS: 1 days | Discharge: HOME | End: 2024-01-08
Payer: MEDICARE

## 2024-01-08 DIAGNOSIS — S72.90XA UNSPECIFIED FRACTURE OF UNSPECIFIED FEMUR, INITIAL ENCOUNTER FOR CLOSED FRACTURE: Chronic | ICD-10-CM

## 2024-01-08 DIAGNOSIS — D64.9 ANEMIA, UNSPECIFIED: ICD-10-CM

## 2024-01-08 DIAGNOSIS — N18.6 END STAGE RENAL DISEASE: ICD-10-CM

## 2024-01-08 DIAGNOSIS — Z98.890 OTHER SPECIFIED POSTPROCEDURAL STATES: Chronic | ICD-10-CM

## 2024-01-08 LAB
BLD GP AB SCN SERPL QL: SIGNIFICANT CHANGE UP
BLD GP AB SCN SERPL QL: SIGNIFICANT CHANGE UP

## 2024-01-08 PROCEDURE — 36415 COLL VENOUS BLD VENIPUNCTURE: CPT

## 2024-01-08 PROCEDURE — 86901 BLOOD TYPING SEROLOGIC RH(D): CPT

## 2024-01-08 PROCEDURE — 86850 RBC ANTIBODY SCREEN: CPT

## 2024-01-08 PROCEDURE — 86900 BLOOD TYPING SEROLOGIC ABO: CPT

## 2024-01-08 PROCEDURE — 86923 COMPATIBILITY TEST ELECTRIC: CPT

## 2024-01-09 ENCOUNTER — OUTPATIENT (OUTPATIENT)
Dept: OUTPATIENT SERVICES | Facility: HOSPITAL | Age: 89
LOS: 1 days | Discharge: HOME | End: 2024-01-09
Payer: MEDICARE

## 2024-01-09 VITALS
TEMPERATURE: 99 F | HEART RATE: 72 BPM | RESPIRATION RATE: 17 BRPM | OXYGEN SATURATION: 96 % | DIASTOLIC BLOOD PRESSURE: 50 MMHG | SYSTOLIC BLOOD PRESSURE: 135 MMHG

## 2024-01-09 VITALS
TEMPERATURE: 98 F | OXYGEN SATURATION: 100 % | DIASTOLIC BLOOD PRESSURE: 45 MMHG | HEART RATE: 74 BPM | SYSTOLIC BLOOD PRESSURE: 133 MMHG | RESPIRATION RATE: 19 BRPM

## 2024-01-09 DIAGNOSIS — D64.9 ANEMIA, UNSPECIFIED: ICD-10-CM

## 2024-01-09 DIAGNOSIS — S72.90XA UNSPECIFIED FRACTURE OF UNSPECIFIED FEMUR, INITIAL ENCOUNTER FOR CLOSED FRACTURE: Chronic | ICD-10-CM

## 2024-01-09 DIAGNOSIS — N18.6 END STAGE RENAL DISEASE: ICD-10-CM

## 2024-01-09 DIAGNOSIS — Z98.890 OTHER SPECIFIED POSTPROCEDURAL STATES: Chronic | ICD-10-CM

## 2024-01-09 PROCEDURE — 36430 TRANSFUSION BLD/BLD COMPNT: CPT

## 2024-01-09 PROCEDURE — P9016: CPT

## 2024-01-09 RX ORDER — DIPHENHYDRAMINE HCL 50 MG
25 CAPSULE ORAL ONCE
Refills: 0 | Status: COMPLETED | OUTPATIENT
Start: 2024-01-09 | End: 2024-01-09

## 2024-01-09 RX ORDER — ACETAMINOPHEN 500 MG
650 TABLET ORAL ONCE
Refills: 0 | Status: COMPLETED | OUTPATIENT
Start: 2024-01-09 | End: 2024-01-09

## 2024-01-09 RX ADMIN — Medication 650 MILLIGRAM(S): at 07:39

## 2024-01-09 RX ADMIN — Medication 25 MILLIGRAM(S): at 07:39

## 2024-01-09 RX ADMIN — Medication 650 MILLIGRAM(S): at 08:17

## 2024-01-10 DIAGNOSIS — D64.9 ANEMIA, UNSPECIFIED: ICD-10-CM

## 2024-01-24 ENCOUNTER — OUTPATIENT (OUTPATIENT)
Dept: OUTPATIENT SERVICES | Facility: HOSPITAL | Age: 89
LOS: 1 days | Discharge: HOME | End: 2024-01-24
Payer: MEDICARE

## 2024-01-24 DIAGNOSIS — Z98.890 OTHER SPECIFIED POSTPROCEDURAL STATES: Chronic | ICD-10-CM

## 2024-01-24 DIAGNOSIS — N18.6 END STAGE RENAL DISEASE: ICD-10-CM

## 2024-01-24 DIAGNOSIS — D64.9 ANEMIA, UNSPECIFIED: ICD-10-CM

## 2024-01-24 DIAGNOSIS — S72.90XA UNSPECIFIED FRACTURE OF UNSPECIFIED FEMUR, INITIAL ENCOUNTER FOR CLOSED FRACTURE: Chronic | ICD-10-CM

## 2024-01-24 LAB — BLD GP AB SCN SERPL QL: SIGNIFICANT CHANGE UP

## 2024-01-24 PROCEDURE — 36415 COLL VENOUS BLD VENIPUNCTURE: CPT

## 2024-01-24 PROCEDURE — 86850 RBC ANTIBODY SCREEN: CPT

## 2024-01-24 PROCEDURE — 86901 BLOOD TYPING SEROLOGIC RH(D): CPT

## 2024-01-24 PROCEDURE — 86923 COMPATIBILITY TEST ELECTRIC: CPT

## 2024-01-24 PROCEDURE — 86900 BLOOD TYPING SEROLOGIC ABO: CPT

## 2024-01-25 DIAGNOSIS — D64.9 ANEMIA, UNSPECIFIED: ICD-10-CM

## 2024-01-26 ENCOUNTER — OUTPATIENT (OUTPATIENT)
Dept: OUTPATIENT SERVICES | Facility: HOSPITAL | Age: 89
LOS: 1 days | Discharge: HOME | End: 2024-01-26
Payer: MEDICARE

## 2024-01-26 VITALS
DIASTOLIC BLOOD PRESSURE: 48 MMHG | OXYGEN SATURATION: 98 % | SYSTOLIC BLOOD PRESSURE: 130 MMHG | TEMPERATURE: 98 F | RESPIRATION RATE: 19 BRPM | HEART RATE: 69 BPM

## 2024-01-26 VITALS
OXYGEN SATURATION: 98 % | HEART RATE: 70 BPM | RESPIRATION RATE: 18 BRPM | SYSTOLIC BLOOD PRESSURE: 150 MMHG | DIASTOLIC BLOOD PRESSURE: 52 MMHG | TEMPERATURE: 99 F

## 2024-01-26 DIAGNOSIS — Z98.890 OTHER SPECIFIED POSTPROCEDURAL STATES: Chronic | ICD-10-CM

## 2024-01-26 DIAGNOSIS — S72.90XA UNSPECIFIED FRACTURE OF UNSPECIFIED FEMUR, INITIAL ENCOUNTER FOR CLOSED FRACTURE: Chronic | ICD-10-CM

## 2024-01-26 DIAGNOSIS — D64.9 ANEMIA, UNSPECIFIED: ICD-10-CM

## 2024-01-26 DIAGNOSIS — N18.6 END STAGE RENAL DISEASE: ICD-10-CM

## 2024-01-26 PROCEDURE — 36430 TRANSFUSION BLD/BLD COMPNT: CPT

## 2024-01-26 PROCEDURE — P9040: CPT

## 2024-01-26 RX ORDER — DIPHENHYDRAMINE HCL 50 MG
25 CAPSULE ORAL ONCE
Refills: 0 | Status: COMPLETED | OUTPATIENT
Start: 2024-01-26 | End: 2024-01-26

## 2024-01-26 RX ORDER — ACETAMINOPHEN 500 MG
650 TABLET ORAL ONCE
Refills: 0 | Status: COMPLETED | OUTPATIENT
Start: 2024-01-26 | End: 2024-01-26

## 2024-01-26 RX ADMIN — Medication 650 MILLIGRAM(S): at 08:18

## 2024-01-26 RX ADMIN — Medication 25 MILLIGRAM(S): at 08:10

## 2024-01-26 RX ADMIN — Medication 650 MILLIGRAM(S): at 07:37

## 2024-01-27 DIAGNOSIS — D64.9 ANEMIA, UNSPECIFIED: ICD-10-CM

## 2024-02-14 ENCOUNTER — INPATIENT (INPATIENT)
Facility: HOSPITAL | Age: 89
LOS: 0 days | Discharge: LEFT BEFORE DISCHARGE | DRG: 194 | End: 2024-02-15
Attending: STUDENT IN AN ORGANIZED HEALTH CARE EDUCATION/TRAINING PROGRAM | Admitting: INTERNAL MEDICINE
Payer: MEDICARE

## 2024-02-14 VITALS — HEIGHT: 64 IN | WEIGHT: 130.07 LBS

## 2024-02-14 DIAGNOSIS — D64.9 ANEMIA, UNSPECIFIED: ICD-10-CM

## 2024-02-14 DIAGNOSIS — S72.90XA UNSPECIFIED FRACTURE OF UNSPECIFIED FEMUR, INITIAL ENCOUNTER FOR CLOSED FRACTURE: Chronic | ICD-10-CM

## 2024-02-14 DIAGNOSIS — Z98.890 OTHER SPECIFIED POSTPROCEDURAL STATES: Chronic | ICD-10-CM

## 2024-02-14 LAB
ALBUMIN SERPL ELPH-MCNC: 3 G/DL — LOW (ref 3.3–5)
ALP SERPL-CCNC: 51 U/L — SIGNIFICANT CHANGE UP (ref 40–120)
ALT FLD-CCNC: 42 U/L — SIGNIFICANT CHANGE UP (ref 12–78)
ANION GAP SERPL CALC-SCNC: 6 MMOL/L — SIGNIFICANT CHANGE UP (ref 5–17)
ANISOCYTOSIS BLD QL: SLIGHT — SIGNIFICANT CHANGE UP
APPEARANCE UR: CLEAR — SIGNIFICANT CHANGE UP
APTT BLD: 40.7 SEC — HIGH (ref 24.5–35.6)
AST SERPL-CCNC: 49 U/L — HIGH (ref 15–37)
BACTERIA # UR AUTO: NEGATIVE /HPF — SIGNIFICANT CHANGE UP
BASO STIPL BLD QL SMEAR: PRESENT — SIGNIFICANT CHANGE UP
BASOPHILS # BLD AUTO: 0 K/UL — SIGNIFICANT CHANGE UP (ref 0–0.2)
BASOPHILS NFR BLD AUTO: 0 % — SIGNIFICANT CHANGE UP (ref 0–2)
BILIRUB SERPL-MCNC: 0.5 MG/DL — SIGNIFICANT CHANGE UP (ref 0.2–1.2)
BILIRUB UR-MCNC: NEGATIVE — SIGNIFICANT CHANGE UP
BLD GP AB SCN SERPL QL: SIGNIFICANT CHANGE UP
BUN SERPL-MCNC: 47 MG/DL — HIGH (ref 7–23)
CALCIUM SERPL-MCNC: 8.3 MG/DL — LOW (ref 8.5–10.1)
CAST: 0 /LPF — SIGNIFICANT CHANGE UP (ref 0–4)
CHLORIDE SERPL-SCNC: 102 MMOL/L — SIGNIFICANT CHANGE UP (ref 96–108)
CO2 SERPL-SCNC: 26 MMOL/L — SIGNIFICANT CHANGE UP (ref 22–31)
COLOR SPEC: YELLOW — SIGNIFICANT CHANGE UP
CREAT SERPL-MCNC: 1.11 MG/DL — SIGNIFICANT CHANGE UP (ref 0.5–1.3)
DIFF PNL FLD: NEGATIVE — SIGNIFICANT CHANGE UP
EGFR: 48 ML/MIN/1.73M2 — LOW
EOSINOPHIL # BLD AUTO: 0 K/UL — SIGNIFICANT CHANGE UP (ref 0–0.5)
EOSINOPHIL NFR BLD AUTO: 0 % — SIGNIFICANT CHANGE UP (ref 0–6)
FLUAV AG NPH QL: DETECTED
FLUBV AG NPH QL: SIGNIFICANT CHANGE UP
GIANT PLATELETS BLD QL SMEAR: PRESENT — SIGNIFICANT CHANGE UP
GLUCOSE SERPL-MCNC: 133 MG/DL — HIGH (ref 70–99)
GLUCOSE UR QL: NEGATIVE MG/DL — SIGNIFICANT CHANGE UP
HCT VFR BLD CALC: 18 % — CRITICAL LOW (ref 34.5–45)
HGB BLD-MCNC: 6.2 G/DL — CRITICAL LOW (ref 11.5–15.5)
INR BLD: 1.37 RATIO — HIGH (ref 0.85–1.18)
KETONES UR-MCNC: NEGATIVE MG/DL — SIGNIFICANT CHANGE UP
LACTATE SERPL-SCNC: 0.8 MMOL/L — SIGNIFICANT CHANGE UP (ref 0.7–2)
LEUKOCYTE ESTERASE UR-ACNC: ABNORMAL
LYMPHOCYTES # BLD AUTO: 1.42 K/UL — SIGNIFICANT CHANGE UP (ref 1–3.3)
LYMPHOCYTES # BLD AUTO: 38 % — SIGNIFICANT CHANGE UP (ref 13–44)
MANUAL SMEAR VERIFICATION: SIGNIFICANT CHANGE UP
MCHC RBC-ENTMCNC: 30.5 PG — SIGNIFICANT CHANGE UP (ref 27–34)
MCHC RBC-ENTMCNC: 34.4 GM/DL — SIGNIFICANT CHANGE UP (ref 32–36)
MCV RBC AUTO: 88.7 FL — SIGNIFICANT CHANGE UP (ref 80–100)
MICROCYTES BLD QL: SLIGHT — SIGNIFICANT CHANGE UP
MONOCYTES # BLD AUTO: 0.97 K/UL — HIGH (ref 0–0.9)
MONOCYTES NFR BLD AUTO: 26 % — HIGH (ref 2–14)
NEUTROPHILS # BLD AUTO: 1.16 K/UL — LOW (ref 1.8–7.4)
NEUTROPHILS NFR BLD AUTO: 29 % — LOW (ref 43–77)
NEUTS BAND # BLD: 2 % — SIGNIFICANT CHANGE UP (ref 0–8)
NITRITE UR-MCNC: NEGATIVE — SIGNIFICANT CHANGE UP
NRBC # BLD: 0 /100 WBCS — SIGNIFICANT CHANGE UP (ref 0–0)
NRBC # BLD: SIGNIFICANT CHANGE UP /100 WBCS (ref 0–0)
PH UR: 5.5 — SIGNIFICANT CHANGE UP (ref 5–8)
PLAT MORPH BLD: ABNORMAL
PLATELET # BLD AUTO: 138 K/UL — LOW (ref 150–400)
POIKILOCYTOSIS BLD QL AUTO: SLIGHT — SIGNIFICANT CHANGE UP
POTASSIUM SERPL-MCNC: 4.4 MMOL/L — SIGNIFICANT CHANGE UP (ref 3.5–5.3)
POTASSIUM SERPL-SCNC: 4.4 MMOL/L — SIGNIFICANT CHANGE UP (ref 3.5–5.3)
PROT SERPL-MCNC: 6.8 GM/DL — SIGNIFICANT CHANGE UP (ref 6–8.3)
PROT UR-MCNC: NEGATIVE MG/DL — SIGNIFICANT CHANGE UP
PROTHROM AB SERPL-ACNC: 15.3 SEC — HIGH (ref 9.5–13)
RBC # BLD: 2.03 M/UL — LOW (ref 3.8–5.2)
RBC # FLD: 14.7 % — HIGH (ref 10.3–14.5)
RBC BLD AUTO: ABNORMAL
RBC CASTS # UR COMP ASSIST: 4 /HPF — SIGNIFICANT CHANGE UP (ref 0–4)
RSV RNA NPH QL NAA+NON-PROBE: SIGNIFICANT CHANGE UP
SARS-COV-2 RNA SPEC QL NAA+PROBE: SIGNIFICANT CHANGE UP
SODIUM SERPL-SCNC: 134 MMOL/L — LOW (ref 135–145)
SP GR SPEC: 1.02 — SIGNIFICANT CHANGE UP (ref 1–1.03)
SQUAMOUS # UR AUTO: 1 /HPF — SIGNIFICANT CHANGE UP (ref 0–5)
TROPONIN I, HIGH SENSITIVITY RESULT: 1928.1 NG/L — HIGH
TROPONIN I, HIGH SENSITIVITY RESULT: 2101.24 NG/L — HIGH
TROPONIN I, HIGH SENSITIVITY RESULT: 2130.01 NG/L — HIGH
UROBILINOGEN FLD QL: 0.2 MG/DL — SIGNIFICANT CHANGE UP (ref 0.2–1)
VARIANT LYMPHS # BLD: 5 % — SIGNIFICANT CHANGE UP (ref 0–6)
WBC # BLD: 3.74 K/UL — LOW (ref 3.8–10.5)
WBC # FLD AUTO: 3.74 K/UL — LOW (ref 3.8–10.5)
WBC UR QL: 4 /HPF — SIGNIFICANT CHANGE UP (ref 0–5)

## 2024-02-14 PROCEDURE — 94640 AIRWAY INHALATION TREATMENT: CPT

## 2024-02-14 PROCEDURE — 84484 ASSAY OF TROPONIN QUANT: CPT

## 2024-02-14 PROCEDURE — 84100 ASSAY OF PHOSPHORUS: CPT

## 2024-02-14 PROCEDURE — 71250 CT THORAX DX C-: CPT

## 2024-02-14 PROCEDURE — 93005 ELECTROCARDIOGRAM TRACING: CPT

## 2024-02-14 PROCEDURE — 80053 COMPREHEN METABOLIC PANEL: CPT

## 2024-02-14 PROCEDURE — 36415 COLL VENOUS BLD VENIPUNCTURE: CPT

## 2024-02-14 PROCEDURE — 85027 COMPLETE CBC AUTOMATED: CPT

## 2024-02-14 PROCEDURE — 71045 X-RAY EXAM CHEST 1 VIEW: CPT | Mod: 26

## 2024-02-14 PROCEDURE — 93306 TTE W/DOPPLER COMPLETE: CPT

## 2024-02-14 PROCEDURE — 86900 BLOOD TYPING SEROLOGIC ABO: CPT

## 2024-02-14 PROCEDURE — 86850 RBC ANTIBODY SCREEN: CPT

## 2024-02-14 PROCEDURE — 71250 CT THORAX DX C-: CPT | Mod: 26

## 2024-02-14 PROCEDURE — 83735 ASSAY OF MAGNESIUM: CPT

## 2024-02-14 PROCEDURE — 86901 BLOOD TYPING SEROLOGIC RH(D): CPT

## 2024-02-14 PROCEDURE — 36430 TRANSFUSION BLD/BLD COMPNT: CPT

## 2024-02-14 PROCEDURE — P9016: CPT

## 2024-02-14 PROCEDURE — 99285 EMERGENCY DEPT VISIT HI MDM: CPT

## 2024-02-14 PROCEDURE — 99223 1ST HOSP IP/OBS HIGH 75: CPT

## 2024-02-14 PROCEDURE — 99497 ADVNCD CARE PLAN 30 MIN: CPT | Mod: 25

## 2024-02-14 RX ORDER — ROSUVASTATIN CALCIUM 5 MG/1
1 TABLET ORAL
Refills: 0 | DISCHARGE

## 2024-02-14 RX ORDER — LEVOTHYROXINE SODIUM 125 MCG
50 TABLET ORAL DAILY
Refills: 0 | Status: DISCONTINUED | OUTPATIENT
Start: 2024-02-14 | End: 2024-02-15

## 2024-02-14 RX ORDER — CHOLECALCIFEROL (VITAMIN D3) 125 MCG
1000 CAPSULE ORAL DAILY
Refills: 0 | Status: DISCONTINUED | OUTPATIENT
Start: 2024-02-14 | End: 2024-02-15

## 2024-02-14 RX ORDER — ALBUTEROL 90 UG/1
2 AEROSOL, METERED ORAL EVERY 6 HOURS
Refills: 0 | Status: DISCONTINUED | OUTPATIENT
Start: 2024-02-14 | End: 2024-02-15

## 2024-02-14 RX ORDER — ATORVASTATIN CALCIUM 80 MG/1
80 TABLET, FILM COATED ORAL AT BEDTIME
Refills: 0 | Status: DISCONTINUED | OUTPATIENT
Start: 2024-02-14 | End: 2024-02-15

## 2024-02-14 RX ORDER — ALBUTEROL 90 UG/1
1 AEROSOL, METERED ORAL EVERY 6 HOURS
Refills: 0 | Status: DISCONTINUED | OUTPATIENT
Start: 2024-02-14 | End: 2024-02-15

## 2024-02-14 RX ORDER — AZITHROMYCIN 500 MG/1
500 TABLET, FILM COATED ORAL DAILY
Refills: 0 | Status: DISCONTINUED | OUTPATIENT
Start: 2024-02-14 | End: 2024-02-15

## 2024-02-14 RX ORDER — BUDESONIDE AND FORMOTEROL FUMARATE DIHYDRATE 160; 4.5 UG/1; UG/1
2 AEROSOL RESPIRATORY (INHALATION)
Refills: 0 | Status: DISCONTINUED | OUTPATIENT
Start: 2024-02-14 | End: 2024-02-15

## 2024-02-14 RX ORDER — CEFTRIAXONE 500 MG/1
1000 INJECTION, POWDER, FOR SOLUTION INTRAMUSCULAR; INTRAVENOUS EVERY 24 HOURS
Refills: 0 | Status: DISCONTINUED | OUTPATIENT
Start: 2024-02-14 | End: 2024-02-15

## 2024-02-14 RX ORDER — PIPERACILLIN AND TAZOBACTAM 4; .5 G/20ML; G/20ML
3.38 INJECTION, POWDER, LYOPHILIZED, FOR SOLUTION INTRAVENOUS ONCE
Refills: 0 | Status: COMPLETED | OUTPATIENT
Start: 2024-02-14 | End: 2024-02-14

## 2024-02-14 RX ORDER — VITAMIN E 100 UNIT
1 CAPSULE ORAL
Refills: 0 | DISCHARGE

## 2024-02-14 RX ORDER — ACETAMINOPHEN 500 MG
650 TABLET ORAL ONCE
Refills: 0 | Status: COMPLETED | OUTPATIENT
Start: 2024-02-14 | End: 2024-02-14

## 2024-02-14 RX ORDER — FENOFIBRATE,MICRONIZED 130 MG
145 CAPSULE ORAL DAILY
Refills: 0 | Status: DISCONTINUED | OUTPATIENT
Start: 2024-02-14 | End: 2024-02-15

## 2024-02-14 RX ORDER — ESCITALOPRAM OXALATE 10 MG/1
10 TABLET, FILM COATED ORAL DAILY
Refills: 0 | Status: DISCONTINUED | OUTPATIENT
Start: 2024-02-14 | End: 2024-02-15

## 2024-02-14 RX ORDER — ASCORBIC ACID 60 MG
500 TABLET,CHEWABLE ORAL DAILY
Refills: 0 | Status: DISCONTINUED | OUTPATIENT
Start: 2024-02-14 | End: 2024-02-15

## 2024-02-14 RX ORDER — VANCOMYCIN HCL 1 G
1000 VIAL (EA) INTRAVENOUS ONCE
Refills: 0 | Status: COMPLETED | OUTPATIENT
Start: 2024-02-14 | End: 2024-02-14

## 2024-02-14 RX ORDER — LOSARTAN POTASSIUM 100 MG/1
50 TABLET, FILM COATED ORAL DAILY
Refills: 0 | Status: DISCONTINUED | OUTPATIENT
Start: 2024-02-14 | End: 2024-02-15

## 2024-02-14 RX ADMIN — ALBUTEROL 1 PUFF(S): 90 AEROSOL, METERED ORAL at 21:00

## 2024-02-14 RX ADMIN — BUDESONIDE AND FORMOTEROL FUMARATE DIHYDRATE 2 PUFF(S): 160; 4.5 AEROSOL RESPIRATORY (INHALATION) at 21:00

## 2024-02-14 RX ADMIN — Medication 250 MILLIGRAM(S): at 14:50

## 2024-02-14 RX ADMIN — ATORVASTATIN CALCIUM 80 MILLIGRAM(S): 80 TABLET, FILM COATED ORAL at 22:56

## 2024-02-14 RX ADMIN — CEFTRIAXONE 1000 MILLIGRAM(S): 500 INJECTION, POWDER, FOR SOLUTION INTRAMUSCULAR; INTRAVENOUS at 16:12

## 2024-02-14 RX ADMIN — Medication 600 MILLIGRAM(S): at 22:56

## 2024-02-14 RX ADMIN — Medication 75 MILLIGRAM(S): at 16:11

## 2024-02-14 RX ADMIN — PIPERACILLIN AND TAZOBACTAM 200 GRAM(S): 4; .5 INJECTION, POWDER, LYOPHILIZED, FOR SOLUTION INTRAVENOUS at 14:24

## 2024-02-14 RX ADMIN — Medication 30 MILLIGRAM(S): at 22:56

## 2024-02-14 RX ADMIN — Medication 650 MILLIGRAM(S): at 14:50

## 2024-02-14 NOTE — H&P ADULT - HISTORY OF PRESENT ILLNESS
88 y/o female with a PMHx of MDS S/P CHemo, HTN, HLD, anemia, hard of hearing, presents to the ED [facility] for blood transfusion. Pt states she was getting routine blood work done with her oncologist when her hgb was noted to be 6.1. Pt has had a fever recently tmax 100.8. +coughing. No blood in her stool reported. Denies chest pain, Dyspnea , palpitations, syncope, N/v/d, abd pain    In the ER patient received Vanco and Zosyn.

## 2024-02-14 NOTE — ED ADULT NURSE REASSESSMENT NOTE - COMFORT CARE
assisted to bathroom/plan of care explained/repositioned/side rails up/wait time explained/warm blanket provided Waiting private transportation

## 2024-02-14 NOTE — H&P ADULT - NSHPPHYSICALEXAM_GEN_ALL_CORE
PHYSICAL EXAM:  Constitutional: NAD, awake and alert  Neurological: AAO x 3, no focal deficits  HEENT: PERRLA, EOMI, MMM  Neck: Soft and supple, No LAD, No JVD  Respiratory: Breath sounds are clear bilaterally, + wheezing and rhonchi  Cardiovascular: S1 and S2, regular rate and rhythm; no Murmurs, gallops or rubs  Gastrointestinal: Bowel Sounds present, soft, nontender, nondistended, no guarding, no rebound tenderness  Back: No CVA tenderness   Extremities: No peripheral edema  Vascular: 2+ peripheral pulses  Musculoskeletal: 5/5 strength b/l upper and lower extremities  Skin: No rashes  Breast: Deferred  Rectal: Deferred

## 2024-02-14 NOTE — PATIENT PROFILE ADULT - FUNCTIONAL ASSESSMENT - BASIC MOBILITY 6.
3-calculated by average/Not able to assess (calculate score using [facility] averaging method)

## 2024-02-14 NOTE — PATIENT PROFILE ADULT - FALL HARM RISK - HARM RISK INTERVENTIONS
Assistance with ambulation/Assistance OOB with selected safe patient handling equipment/Communicate Risk of Fall with Harm to all staff/Discuss with provider need for PT consult/Monitor gait and stability/Provide patient with walking aids - walker, cane, crutches/Reinforce activity limits and safety measures with patient and family/Review medications for side effects contributing to fall risk/Sit up slowly, dangle for a short time, stand at bedside before walking/Tailored Fall Risk Interventions/Toileting schedule using arm’s reach rule for commode and bathroom/Use of alarms - bed, chair and/or voice tab/Visual Cue: Yellow wristband and red socks/Bed in lowest position, wheels locked, appropriate side rails in place/Call bell, personal items and telephone in reach/Instruct patient to call for assistance before getting out of bed or chair/Non-slip footwear when patient is out of bed/[place] to call system/Physically safe environment - no spills, clutter or unnecessary equipment/Purposeful Proactive Rounding/Room/bathroom lighting operational, light cord in reach

## 2024-02-14 NOTE — H&P ADULT - CONVERSATION DETAILS
Advanced Care Planning 47 minutes.  Discussion with patient regarding advance directives. We discussed diagnosis, prognosis and goals of care. At this time  She wishes to be fully resuscitated and mechanically ventilated if need arises. There are no limitations of any sort in her medical care and management. She is FULL CODE. Patient Is AAO X 3; Son is the HCP

## 2024-02-14 NOTE — ED ADULT NURSE NOTE - OBJECTIVE STATEMENT
pt sent in by MD for low hemoglobin. complains of generalized weakness, cough, denies active bleeding. rectal temp 100.4. md giuseppe aware. denies cp/sob/n/v/d.

## 2024-02-14 NOTE — ED PROVIDER NOTE - OBJECTIVE STATEMENT
87 yo female w/PMHx hypothyroidism, HLD, HTN, acute promyelocytic leukemia, and anemia presents to the ED [facility] for blood transfusion. Pt states she was getting routine blood work done with her oncologist when her hgb was noted to be 6.1. Pt has had a fever recently tmax 100.8. +coughing. No blood in her stool reported. Denies chest pain.

## 2024-02-14 NOTE — ED ADULT NURSE NOTE - CHIEF COMPLAINT QUOTE
pt sent in by [facility] infusion center for blood transfusion. Hemoglobin of 6.1. Endorses sob, fatigue, cough, congestion, fever x 2 days 100.8 F. Stat ekg. Hard of hearing

## 2024-02-14 NOTE — ED ADULT NURSE NOTE - NSFALLUNIVINTERV_ED_ALL_ED
Bed/Stretcher in lowest position, wheels locked, appropriate side rails in place/Call bell, personal items and telephone in reach/Instruct patient to call for assistance before getting out of bed/chair/stretcher/Non-slip footwear applied when patient is off stretcher/[place] to call system/Physically safe environment - no spills, clutter or unnecessary equipment/Purposeful proactive rounding/Room/bathroom lighting operational, light cord in reach

## 2024-02-14 NOTE — H&P ADULT - NSHPLABSRESULTS_GEN_ALL_CORE
Lab Results:  CBC  CBC Full  -  ( 14 Feb 2024 11:58 )  WBC Count : 3.74 K/uL  RBC Count : 2.03 M/uL  Hemoglobin : 6.2 g/dL  Hematocrit : 18.0 %  Platelet Count - Automated : 138 K/uL  Mean Cell Volume : 88.7 fl  Mean Cell Hemoglobin : 30.5 pg  Mean Cell Hemoglobin Concentration : 34.4 gm/dL  Auto Neutrophil # : 1.16 K/uL  Auto Lymphocyte # : 1.42 K/uL  Auto Monocyte # : 0.97 K/uL  Auto Eosinophil # : 0.00 K/uL  Auto Basophil # : 0.00 K/uL  Auto Neutrophil % : 29.0 %  Auto Lymphocyte % : 38.0 %  Auto Monocyte % : 26.0 %  Auto Eosinophil % : 0.0 %  Auto Basophil % : 0.0 %    .		Differential:	[] Automated		[] Manual  Chemistry                        6.2    3.74  )-----------( 138      ( 14 Feb 2024 11:58 )             18.0     02-14    134<L>  |  102  |  47<H>  ----------------------------<  133<H>  4.4   |  26  |  1.11    Ca    8.3<L>      14 Feb 2024 11:58    TPro  6.8  /  Alb  3.0<L>  /  TBili  0.5  /  DBili  x   /  AST  49<H>  /  ALT  42  /  AlkPhos  51  02-14    LIVER FUNCTIONS - ( 14 Feb 2024 11:58 )  Alb: 3.0 g/dL / Pro: 6.8 gm/dL / ALK PHOS: 51 U/L / ALT: 42 U/L / AST: 49 U/L / GGT: x           PT/INR - ( 14 Feb 2024 11:58 )   PT: 15.3 sec;   INR: 1.37 ratio         PTT - ( 14 Feb 2024 11:58 )  PTT:40.7 sec  Urinalysis Basic - ( 14 Feb 2024 11:58 )    Color: x / Appearance: x / SG: x / pH: x  Gluc: 133 mg/dL / Ketone: x  / Bili: x / Urobili: x   Blood: x / Protein: x / Nitrite: x   Leuk Esterase: x / RBC: x / WBC x   Sq Epi: x / Non Sq Epi: x / Bacteria: x      RADIOLOGY RESULTS:    EKG NSR     < from: Xray Chest 1 View-PORTABLE IMMEDIATE (02.14.24 @ 12:56) >    Impression:  There is opacity in the left midlung field may represent   consolidation/infiltrate.    Recommend correlation clinically and as needed repeat examination with   the bra removed or CT    < end of copied text >

## 2024-02-14 NOTE — ED ADULT NURSE REASSESSMENT NOTE - NS ED NURSE REASSESS COMMENT FT1
Pt resting comfortably in bed, denies any pain/ symptoms at this time, pt temp 100.4F at this time- MD Santos made aware, vital signs stable, awaiting orders.

## 2024-02-14 NOTE — ED ADULT TRIAGE NOTE - CHIEF COMPLAINT QUOTE
pt sent in by [facility] infusion center for blood transfusion. Hemoglobin of 6.1. Endorses sob, fatigue, cough, congestion, fever x 2 days 100.8 F. Stat ekg. Hard of hearing

## 2024-02-14 NOTE — H&P ADULT - ASSESSMENT
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

## 2024-02-14 NOTE — ED PROVIDER NOTE - CARE PLAN
Principal Discharge DX:	Anemia  Secondary Diagnosis:	NSTEMI (non-ST elevation myocardial infarction)  Secondary Diagnosis:	Pneumonia due to influenza A virus   1

## 2024-02-14 NOTE — ED PROVIDER NOTE - CLINICAL SUMMARY MEDICAL DECISION MAKING FREE TEXT BOX
89 yo female with acute promyelocytic leukemia, with anemia and fever found to have infiltrate on CXR. +troponin likely secondary to demand. No chest pain, no SOB. Will transfuse PRBC, IV antibiotics, and admit. 87 yo female with acute promyelocytic leukemia, with anemia and fever found to have infiltrate on CXR. +troponin likely secondary to demand. No chest pain, no SOB. Will transfuse PRBC, IV antibiotics, and admit.    Danielle STACY: spoke with Dr. Chowdhury for cardiology- regarding trop- recommend trending; no acute interventions; patient with no chest pain; ekg- no stemi; admitting hospitalist- made aware.

## 2024-02-14 NOTE — CONSULT NOTE ADULT - ASSESSMENT
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

## 2024-02-14 NOTE — PHARMACOTHERAPY INTERVENTION NOTE - COMMENTS
Med history complete, reviewed medications and allergies with patient.  Patient states she does not remember all the medications she takes at home, was able to recall the medication names with prompting, all medication related questions answered and confirmed medication list with doctor first med profile

## 2024-02-14 NOTE — PROVIDER CONTACT NOTE (CRITICAL VALUE NOTIFICATION) - ACTION/TREATMENT ORDERED:
MD Santos made aware, blood transfusion consent forms printed at this time.
md chin aware, repeat EKG performed, no other interventions ordered at this time.

## 2024-02-15 VITALS — WEIGHT: 106.92 LBS

## 2024-02-15 LAB
ALBUMIN SERPL ELPH-MCNC: 2.4 G/DL — LOW (ref 3.3–5)
ALP SERPL-CCNC: 47 U/L — SIGNIFICANT CHANGE UP (ref 40–120)
ALT FLD-CCNC: 36 U/L — SIGNIFICANT CHANGE UP (ref 12–78)
ANION GAP SERPL CALC-SCNC: 2 MMOL/L — LOW (ref 5–17)
AST SERPL-CCNC: 34 U/L — SIGNIFICANT CHANGE UP (ref 15–37)
BILIRUB SERPL-MCNC: 0.5 MG/DL — SIGNIFICANT CHANGE UP (ref 0.2–1.2)
BUN SERPL-MCNC: 40 MG/DL — HIGH (ref 7–23)
CALCIUM SERPL-MCNC: 7.3 MG/DL — LOW (ref 8.5–10.1)
CHLORIDE SERPL-SCNC: 109 MMOL/L — HIGH (ref 96–108)
CO2 SERPL-SCNC: 26 MMOL/L — SIGNIFICANT CHANGE UP (ref 22–31)
CREAT SERPL-MCNC: 0.98 MG/DL — SIGNIFICANT CHANGE UP (ref 0.5–1.3)
EGFR: 56 ML/MIN/1.73M2 — LOW
GLUCOSE SERPL-MCNC: 126 MG/DL — HIGH (ref 70–99)
HCT VFR BLD CALC: 23.6 % — LOW (ref 34.5–45)
HGB BLD-MCNC: 7.9 G/DL — LOW (ref 11.5–15.5)
MAGNESIUM SERPL-MCNC: 1.9 MG/DL — SIGNIFICANT CHANGE UP (ref 1.6–2.6)
MCHC RBC-ENTMCNC: 29.5 PG — SIGNIFICANT CHANGE UP (ref 27–34)
MCHC RBC-ENTMCNC: 33.5 GM/DL — SIGNIFICANT CHANGE UP (ref 32–36)
MCV RBC AUTO: 88.1 FL — SIGNIFICANT CHANGE UP (ref 80–100)
PHOSPHATE SERPL-MCNC: 2.6 MG/DL — SIGNIFICANT CHANGE UP (ref 2.5–4.5)
PLATELET # BLD AUTO: 131 K/UL — LOW (ref 150–400)
POTASSIUM SERPL-MCNC: 4.2 MMOL/L — SIGNIFICANT CHANGE UP (ref 3.5–5.3)
POTASSIUM SERPL-SCNC: 4.2 MMOL/L — SIGNIFICANT CHANGE UP (ref 3.5–5.3)
PROT SERPL-MCNC: 5.8 GM/DL — LOW (ref 6–8.3)
RBC # BLD: 2.68 M/UL — LOW (ref 3.8–5.2)
RBC # FLD: 15.2 % — HIGH (ref 10.3–14.5)
SODIUM SERPL-SCNC: 137 MMOL/L — SIGNIFICANT CHANGE UP (ref 135–145)
TROPONIN I, HIGH SENSITIVITY RESULT: 1237.81 NG/L — HIGH
WBC # BLD: 3.81 K/UL — SIGNIFICANT CHANGE UP (ref 3.8–10.5)
WBC # FLD AUTO: 3.81 K/UL — SIGNIFICANT CHANGE UP (ref 3.8–10.5)

## 2024-02-15 PROCEDURE — 93306 TTE W/DOPPLER COMPLETE: CPT | Mod: 26

## 2024-02-15 PROCEDURE — 93010 ELECTROCARDIOGRAM REPORT: CPT

## 2024-02-15 RX ADMIN — ALBUTEROL 1 PUFF(S): 90 AEROSOL, METERED ORAL at 07:46

## 2024-02-15 RX ADMIN — Medication 50 MICROGRAM(S): at 05:38

## 2024-02-15 RX ADMIN — LOSARTAN POTASSIUM 50 MILLIGRAM(S): 100 TABLET, FILM COATED ORAL at 12:35

## 2024-02-15 RX ADMIN — AZITHROMYCIN 500 MILLIGRAM(S): 500 TABLET, FILM COATED ORAL at 12:36

## 2024-02-15 RX ADMIN — Medication 1000 UNIT(S): at 12:37

## 2024-02-15 RX ADMIN — Medication 145 MILLIGRAM(S): at 12:37

## 2024-02-15 RX ADMIN — Medication 600 MILLIGRAM(S): at 12:35

## 2024-02-15 RX ADMIN — ESCITALOPRAM OXALATE 10 MILLIGRAM(S): 10 TABLET, FILM COATED ORAL at 12:36

## 2024-02-15 RX ADMIN — BUDESONIDE AND FORMOTEROL FUMARATE DIHYDRATE 2 PUFF(S): 160; 4.5 AEROSOL RESPIRATORY (INHALATION) at 07:45

## 2024-02-15 RX ADMIN — Medication 500 MILLIGRAM(S): at 12:35

## 2024-02-15 RX ADMIN — Medication 30 MILLIGRAM(S): at 12:37

## 2024-02-15 NOTE — DIETITIAN NUTRITION RISK NOTIFICATION - ADDITIONAL COMMENTS/DIETITIAN RECOMMENDATIONS
1) Liberalize diet to regular to maximize caloric and nutrient intake  2) Add ensure + HP shake BID (350kcal, 20g protein)  3) Monitor bowel movements, if no BM for >3 days, consider implementing bowel regimen  4) Encourage protein-rich foods, maximize food preferences  5) MVI w/ minerals daily to ensure 100% RDA met  6) Consider adding thiamine 100 mg daily 2/2 poor PO intake/ malnutrition  7) Monitor lytes/ min and replete prn  8) Confirm goals of care regarding nutrition support  RD will continue to monitor PO intake, labs, hydration, and wt prn.

## 2024-02-15 NOTE — PROGRESS NOTE ADULT - SUBJECTIVE AND OBJECTIVE BOX
Subjective:    pat better, sitting in bed, congested cough & wheez.    Home Medications:  ascorbic acid 500 mg oral tablet: 1 tab(s) orally once a day (14 Feb 2024 15:22)  aspirin 81 mg oral tablet: 1 tab(s) orally once a day (14 Feb 2024 15:22)  Caltrate 600 mg oral tablet: 1 tab(s) orally once a day (14 Feb 2024 15:22)  cholecalciferol 25 mcg (1000 intl units) oral tablet: 1 tab(s) orally once a day (14 Feb 2024 15:22)  escitalopram 10 mg oral tablet: 1 tab(s) orally once a day (14 Feb 2024 15:21)  fenofibrate 160 mg oral tablet: 1 tab(s) orally once a day (14 Feb 2024 15:22)  hydroCHLOROthiazide 12.5 mg oral tablet: 1 tab(s) orally once a day (14 Feb 2024 15:23)  levothyroxine 50 mcg (0.05 mg) oral tablet: 1 tab(s) orally once a day (14 Feb 2024 15:22)  losartan 50 mg oral tablet: 1 tab(s) orally once a day (in the evening) (14 Feb 2024 15:22)  PreserVision AREDS 2 oral capsule: 1 cap(s) orally once a day (14 Feb 2024 15:22)  Probiotic Formula oral capsule: 1 cap(s) orally once a day (14 Feb 2024 15:22)  Refresh ophthalmic solution: 1 drop(s) in each eye once a day as needed for (14 Feb 2024 15:22)  rosuvastatin 20 mg oral tablet: 1 tab(s) orally once a day (14 Feb 2024 15:22)    MEDICATIONS  (STANDING):  albuterol    90 MICROgram(s) HFA Inhaler 1 Puff(s) Inhalation every 6 hours  ascorbic acid 500 milliGRAM(s) Oral daily  atorvastatin 80 milliGRAM(s) Oral at bedtime  azithromycin   Tablet 500 milliGRAM(s) Oral daily  budesonide 160 MICROgram(s)/formoterol 4.5 MICROgram(s) Inhaler 2 Puff(s) Inhalation two times a day  cefTRIAXone Injectable. 1000 milliGRAM(s) IV Push every 24 hours  cholecalciferol 1000 Unit(s) Oral daily  escitalopram 10 milliGRAM(s) Oral daily  fenofibrate Tablet 145 milliGRAM(s) Oral daily  guaiFENesin  milliGRAM(s) Oral every 12 hours  levothyroxine 50 MICROGram(s) Oral daily  losartan 50 milliGRAM(s) Oral daily  methylPREDNISolone sodium succinate Injectable 40 milliGRAM(s) IV Push every 12 hours  oseltamivir 30 milliGRAM(s) Oral every 12 hours    MEDICATIONS  (PRN):  albuterol    90 MICROgram(s) HFA Inhaler 2 Puff(s) Inhalation every 6 hours PRN Shortness of Breath and/or Wheezing      Allergies    No Known Allergies    Intolerances        Vital Signs Last 24 Hrs  T(C): 37.6 (15 Feb 2024 08:30), Max: 37.6 (14 Feb 2024 23:03)  T(F): 99.7 (15 Feb 2024 08:30), Max: 99.7 (14 Feb 2024 23:03)  HR: 74 (15 Feb 2024 08:30) (74 - 93)  BP: 120/47 (15 Feb 2024 08:30) (116/47 - 154/61)  BP(mean): --  RR: 18 (15 Feb 2024 08:30) (18 - 20)  SpO2: 91% (15 Feb 2024 08:30) (91% - 98%)    Parameters below as of 15 Feb 2024 08:30  Patient On (Oxygen Delivery Method): room air          PHYSICAL EXAMINATION:    NECK:  Supple. No lymphadenopathy. Jugular venous pressure not elevated. Carotids equal.   HEART:   The cardiac impulse has a normal quality. Reg., Nl S1 and S2.  There are no murmurs, rubs or gallops noted  CHEST:  Chest rhonchi to auscultation. Normal respiratory effort.  ABDOMEN:  Soft and nontender.   EXTREMITIES:  There is no edema.       LABS:                        7.9    3.81  )-----------( 131      ( 15 Feb 2024 07:48 )             23.6     02-15    137  |  109<H>  |  40<H>  ----------------------------<  126<H>  4.2   |  26  |  0.98    Ca    7.3<L>      15 Feb 2024 07:48  Phos  2.6     02-15  Mg     1.9     02-15    TPro  5.8<L>  /  Alb  2.4<L>  /  TBili  0.5  /  DBili  x   /  AST  34  /  ALT  36  /  AlkPhos  47  02-15    PT/INR - ( 14 Feb 2024 11:58 )   PT: 15.3 sec;   INR: 1.37 ratio         PTT - ( 14 Feb 2024 11:58 )  PTT:40.7 sec  Urinalysis Basic - ( 15 Feb 2024 07:48 )    Color: x / Appearance: x / SG: x / pH: x  Gluc: 126 mg/dL / Ketone: x  / Bili: x / Urobili: x   Blood: x / Protein: x / Nitrite: x   Leuk Esterase: x / RBC: x / WBC x   Sq Epi: x / Non Sq Epi: x / Bacteria: x

## 2024-02-15 NOTE — DIETITIAN INITIAL EVALUATION ADULT - ORAL INTAKE PTA/DIET HISTORY
Pt lives at home alone; cooks/grocery shops for self w/o difficulty. Is very [place] (w/ no hearing aides) - endorses good appetite, unable to obtain 24-hr recall at this time. Is mostly edentulous - does not have dentures, w/ difficulty chewing & consumes soft foods. Sometimes consumes ensure at home.

## 2024-02-15 NOTE — CHART NOTE - NSCHARTNOTEFT_GEN_A_CORE
Patient electing to leave the hospital against medical advice. I certify that prior to the patient leaving against medical advice, I or other members of the healthcare team involved in the patient's care has/have made reasonable attempts to explain why leaving the Hospital/facility at this time may result inpatient's harm, injury, or potential death. I or other members of the healthcare team has/have explained the risks, benefits, and alternatives to treatment as the attended risks of refusing treatment at this time. I or other members of the healthcare team involved in the patient's care has/have offered to answer any questions and fully answered any such questions. I believe that the patient fully understands what has been explain and answered. Patient signed form to sign out AMA and accepts responsibility for any and all results of this decision.

## 2024-02-15 NOTE — CONSULT NOTE ADULT - ASSESSMENT
86 y/o female with h/o MDS s/p chemo, HTN, HLD, anemia, hard of hearing was admitted on 2/14 for increased weakness. She was seen at [facility] for blood transfusion. Pt states she was getting routine blood work done with her oncologist when her hgb was noted to be 6.1. Pt was febrile at home with Tmax 100.8F and coughing. In the ER patient received Vanco and Zosyn.    1. Febrile syndrome. Multifocal pneumonia. CRF stage 3. MDS on chemo. Immunocompromised host.   -respiratory frail  -start oseltamivir 30 mg PO q12h  -start ceftriaxone 1 gm IV qd and azithromycin 500 mg PO qd  -reason for abx use and side effects reviewed with patient; monitor BMP   -respiratory care  -droplet isolation  -reason for abx use and side effects reviewed with patient; monitor BMP   -old chart reviewed to assess prior cultures  -monitor temps  -f/u CBC  -supportive care  2. Other issues:   -care per medicine

## 2024-02-15 NOTE — PROGRESS NOTE ADULT - ASSESSMENT
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

## 2024-02-15 NOTE — CONSULT NOTE ADULT - SUBJECTIVE AND OBJECTIVE BOX
Patient is a 88y old  Female who presents with a chief complaint of Anemia and congestion     HPI:  88 y/o female with h/o MDS s/p chemo, HTN, HLD, anemia, hard of hearing was admitted on  for increased weakness. She was seen at [facility] for blood transfusion. Pt states she was getting routine blood work done with her oncologist when her hgb was noted to be 6.1. Pt was febrile at home with Tmax 100.8F and coughing. In the ER patient received Vanco and Zosyn.    PMH: as above  PSH: as above  Meds: per reconciliation sheet, noted below  MEDICATIONS  (STANDING):  albuterol    90 MICROgram(s) HFA Inhaler 1 Puff(s) Inhalation every 6 hours  ascorbic acid 500 milliGRAM(s) Oral daily  atorvastatin 80 milliGRAM(s) Oral at bedtime  azithromycin   Tablet 500 milliGRAM(s) Oral daily  budesonide 160 MICROgram(s)/formoterol 4.5 MICROgram(s) Inhaler 2 Puff(s) Inhalation two times a day  cefTRIAXone Injectable. 1000 milliGRAM(s) IV Push every 24 hours  cholecalciferol 1000 Unit(s) Oral daily  escitalopram 10 milliGRAM(s) Oral daily  fenofibrate Tablet 145 milliGRAM(s) Oral daily  guaiFENesin  milliGRAM(s) Oral every 12 hours  levothyroxine 50 MICROGram(s) Oral daily  losartan 50 milliGRAM(s) Oral daily  methylPREDNISolone sodium succinate Injectable 40 milliGRAM(s) IV Push every 12 hours  oseltamivir 30 milliGRAM(s) Oral every 12 hours    MEDICATIONS  (PRN):  albuterol    90 MICROgram(s) HFA Inhaler 2 Puff(s) Inhalation every 6 hours PRN Shortness of Breath and/or Wheezing    Allergies    No Known Allergies    Intolerances    Social: no smoking, no alcohol, no illegal drugs; no recent travel, no exposure to TB  FAMILY HISTORY:  Patient's father is  Hx of spine cancer  Patient's mother is  Hx of breast and bone cancer  Family history of cancer in brother Colon cancer    ROS: the patient denies fever, no chills, no HA, no seizures, no dizziness, no sore throat, no nasal congestion, no blurry vision, no CP, no palpitations, no SOB, no cough, no abdominal pain, no diarrhea, no N/V, no dysuria, no leg pain, no claudication, no rash, no joint aches, no rectal pain or bleeding, no night sweats  All other systems reviewed and are negative    Vital Signs Last 24 Hrs  T(C): 37.6 (15 Feb 2024 08:30), Max: 37.6 (2024 23:03)  T(F): 99.7 (15 Feb 2024 08:30), Max: 99.7 (2024 23:03)  HR: 74 (15 Feb 2024 08:30) (74 - 93)  BP: 120/47 (15 Feb 2024 08:30) (116/47 - 154/61)  BP(mean): --  RR: 18 (15 Feb 2024 08:30) (18 - 20)  SpO2: 91% (15 Feb 2024 08:30) (91% - 98%)    Parameters below as of 15 Feb 2024 08:30  Patient On (Oxygen Delivery Method): room air    PE:    Constitutional:  No acute distress  HEENT: NC/AT, EOMI, PERRLA, conjunctivae clear; ears and nose atraumatic; pharynx benign  Neck: supple; thyroid not palpable  Back: no tenderness  Respiratory: respiratory effort normal; crackles at bases  Cardiovascular: S1S2 regular, no murmurs  Abdomen: soft, not tender, not distended, positive BS; no liver or spleen organomegaly  Genitourinary: no suprapubic tenderness  Lymphatic: no LN palpable  Musculoskeletal: no muscle tenderness, no joint swelling or tenderness  Extremities: no pedal edema  Neurological/ Psychiatric: AxOx3, judgement and insight normal; moving all extremities  Skin: no rashes; no palpable lesions    Labs: all available labs reviewed                        7.9    3.81  )-----------( 131      ( 15 Feb 2024 07:48 )             23.6     02-15    137  |  109<H>  |  40<H>  ----------------------------<  126<H>  4.2   |  26  |  0.98    Ca    7.3<L>      15 Feb 2024 07:48  Phos  2.6     02-15  Mg     1.9     02-15    TPro  5.8<L>  /  Alb  2.4<L>  /  TBili  0.5  /  DBili  x   /  AST  34  /  ALT  36  /  AlkPhos  47  02-15     LIVER FUNCTIONS - ( 15 Feb 2024 07:48 )  Alb: 2.4 g/dL / Pro: 5.8 gm/dL / ALK PHOS: 47 U/L / ALT: 36 U/L / AST: 34 U/L / GGT: x           RPV: influenza A    Radiology: all available radiological tests reviewed    < from: CT Chest No Cont (24 @ 15:48) >  A few bilateral upper lung nodular and patchy opacities may be of infectious etiology with endobronchial spread.   Cardiomegaly.  Nonspecific bilateral interlobular septal thickening can be seen in the setting of pulmonary edema. Clinical correlation is recommended.  < end of copied text >    Advanced directives addressed: full resuscitation
  HPI:    86 y/o female with a PMHx of MDS S/P CHemo, HTN, HLD, anemia, hard of hearing, presents to the ED [facility] for blood transfusion. Pt states she was getting routine blood work done with her oncologist when her hgb was noted to be 6.1. Pt has had a fever recently tmax 100.8. +coughing. No blood in her stool reported. Denies chest pain, Dyspnea , palpitations, syncope, N/v/d, abd pain, In the ER patient received Vanco and Zosyn. pat seen for pulmonary eval.      PAST MEDICAL & SURGICAL HISTORY:  Anemia      Acute promyelocytic leukemia      HTN (hypertension)      HLD (hyperlipidemia)      Hypothyroidism      Macular degeneration  right      Cataract  left eye      H/O lumpectomy  Left side in , performed by Dr. Samuels at       Fracture of femur  right ac right          Home Medications:  ascorbic acid 500 mg oral tablet: 1 tab(s) orally once a day (2024 15:22)  aspirin 81 mg oral tablet: 1 tab(s) orally once a day (2024 15:22)  Caltrate 600 mg oral tablet: 1 tab(s) orally once a day (2024 15:22)  cholecalciferol 25 mcg (1000 intl units) oral tablet: 1 tab(s) orally once a day (2024 15:22)  escitalopram 10 mg oral tablet: 1 tab(s) orally once a day (2024 15:21)  fenofibrate 160 mg oral tablet: 1 tab(s) orally once a day (2024 15:22)  hydroCHLOROthiazide 12.5 mg oral tablet: 1 tab(s) orally once a day (2024 15:23)  levothyroxine 50 mcg (0.05 mg) oral tablet: 1 tab(s) orally once a day (2024 15:22)  losartan 50 mg oral tablet: 1 tab(s) orally once a day (in the evening) (2024 15:22)  PreserVision AREDS 2 oral capsule: 1 cap(s) orally once a day (2024 15:22)  Probiotic Formula oral capsule: 1 cap(s) orally once a day (2024 15:22)  Refresh ophthalmic solution: 1 drop(s) in each eye once a day as needed for (2024 15:22)  rosuvastatin 20 mg oral tablet: 1 tab(s) orally once a day (2024 15:22)      MEDICATIONS  (STANDING):  albuterol    90 MICROgram(s) HFA Inhaler 1 Puff(s) Inhalation every 6 hours  ascorbic acid 500 milliGRAM(s) Oral daily  atorvastatin 80 milliGRAM(s) Oral at bedtime  azithromycin   Tablet 500 milliGRAM(s) Oral daily  budesonide 160 MICROgram(s)/formoterol 4.5 MICROgram(s) Inhaler 2 Puff(s) Inhalation two times a day  cefTRIAXone Injectable. 1000 milliGRAM(s) IV Push every 24 hours  cholecalciferol 1000 Unit(s) Oral daily  escitalopram 10 milliGRAM(s) Oral daily  fenofibrate Tablet 145 milliGRAM(s) Oral daily  guaiFENesin  milliGRAM(s) Oral every 12 hours  levothyroxine 50 MICROGram(s) Oral daily  losartan 50 milliGRAM(s) Oral daily  oseltamivir 30 milliGRAM(s) Oral every 12 hours    MEDICATIONS  (PRN):  albuterol    90 MICROgram(s) HFA Inhaler 2 Puff(s) Inhalation every 6 hours PRN Shortness of Breath and/or Wheezing      Allergies    No Known Allergies    Intolerances        SOCIAL HISTORY: Denies tobacco, etoh abuse or illicit drug use    FAMILY HISTORY:  Patient's father is   Hx of spine cancer    Patient's mother is   Hx of breast and bone cancer    Family history of cancer in brother  Colon cancer        Vital Signs Last 24 Hrs  T(C): 36.6 (2024 17:31), Max: 38 (2024 12:40)  T(F): 97.9 (2024 17:31), Max: 100.4 (2024 12:40)  HR: 93 (2024 17:31) (75 - 93)  BP: 154/61 (2024 17:31) (104/42 - 154/61)  BP(mean): 81 (2024 12:45) (52 - 81)  RR: 20 (2024 17:31) (16 - 20)  SpO2: 95% (2024 17:31) (95% - 100%)    Parameters below as of 2024 17:31  Patient On (Oxygen Delivery Method): room air            REVIEW OF SYSTEMS:    CONSTITUTIONAL:  As per HPI.  HEENT:  Eyes:  No diplopia or blurred vision. ENT:  No earache, sore throat or runny nose.  CARDIOVASCULAR:  No pressure, squeezing, tightness, heaviness or aching about the chest, neck, axilla or epigastrium.  RESPIRATORY:  No cough, +shortness of breath, PND or orthopnea.  GASTROINTESTINAL:  No nausea, vomiting or diarrhea.  GENITOURINARY:  No dysuria, frequency or urgency.  MUSCULOSKELETAL:  As per HPI.  SKIN:  No change in skin, hair or nails.  NEUROLOGIC:  No paresthesias, fasciculations, seizures or weakness.  PSYCHIATRIC:  No disorder of thought or mood.  ENDOCRINE:  No heat or cold intolerance, polyuria or polydipsia.  HEMATOLOGICAL:  No easy bruising or bleedings:  .     PHYSICAL EXAMINATION:    GENERAL APPEARANCE:  Pt. is not currently dyspneic, in no distress. Pt. is alert, oriented, and pleasant.  HEENT:  Pupils are normal and react normally. No icterus. Mucous membranes well colored.  NECK:  Supple. No lymphadenopathy. Jugular venous pressure not elevated. Carotids equal.   HEART:   The cardiac impulse has a normal quality. Regular. Normal S1 and S2. There are no murmurs, rubs or gallops noted  CHEST:  Chest crackles to auscultation. Normal respiratory effort.  ABDOMEN:  Soft and nontender.   EXTREMITIES:  There is no cyanosis, clubbing or edema.   SKIN:  No rash or significant lesions are noted.    LABS:                        6.2    3.74  )-----------( 138      ( 2024 11:58 )             18.0     02-14    134<L>  |  102  |  47<H>  ----------------------------<  133<H>  4.4   |  26  |  1.11    Ca    8.3<L>      2024 11:58    TPro  6.8  /  Alb  3.0<L>  /  TBili  0.5  /  DBili  x   /  AST  49<H>  /  ALT  42  /  AlkPhos  51  02-14    LIVER FUNCTIONS - ( 2024 11:58 )  Alb: 3.0 g/dL / Pro: 6.8 gm/dL / ALK PHOS: 51 U/L / ALT: 42 U/L / AST: 49 U/L / GGT: x           PT/INR - ( 2024 11:58 )   PT: 15.3 sec;   INR: 1.37 ratio         PTT - ( 2024 11:58 )  PTT:40.7 sec      Urinalysis Basic - ( 2024 11:58 )    Color: x / Appearance: x / SG: x / pH: x  Gluc: 133 mg/dL / Ketone: x  / Bili: x / Urobili: x   Blood: x / Protein: x / Nitrite: x   Leuk Esterase: x / RBC: x / WBC x   Sq Epi: x / Non Sq Epi: x / Bacteria: x            RADIOLOGY & ADDITIONAL STUDIES:     CT Chest No Cont (24 @ 15:48) >  IMPRESSION: Limited chest CT due to motion artifact.    A few bilateral upper lung nodular and patchy opacities may be of   infectious etiology with endobronchial spread. A 3 month follow-up   noncontrast chest CT is recommended to ensure improvement/resolution.    Cardiomegaly.    Nonspecific bilateral interlobular septal thickening can be seen in the   setting of pulmonary edema. Clinical correlation is recommended.    
Patient is a 88y old  Female who presents with a chief complaint of Anemia and congestion (15 Feb 2024 13:00)    ________________________________  MADISYN CH is a 88y year old Female with a past medical history of  breast cancer status post chemotherapy in , in 2019 she was diagnosed to have myelodysplastic syndrome with pancytopenia, requires blood transfusion for symptomatic anemia, hypertension, high cholesterol who was seen in our office last month for dyspnea on exertion and underwent an echocardiac showing normal LVEF.  She was also scheduled to have a nuclear stress test.    She now presents for evaluation of symptomatic anemia and transfusion.  She was noted to have influenza pneumonia on CAT scan.  Cardiac enzymes are elevated but trending down.  She denies any chest discomfort.  She stated that she is feeling better with improved shortness of breath since her transfusion.  She is adamant about being discharged.  No evidence of telemetry.  Blood pressure stable.      PREVIOUS CARDIAC WORKUP:    Echocardiogram 2/15/2024   The left ventricle is normal in size, wall thickness, wall motion and   contractility as seen in limited views.   Estimated left ventricular ejection fraction is55-60 %.   Normal appearing right ventricle structure and function.   The left atrium is mildly dilated.   Mild mitral regurgitation is present.   Trace tricuspid valve regurgitation is present.   Mild pulmonic valvular regurgitation is present.    24  -TDS/Limited windows No plax views obtained. No flow across septum.  --LV ejection fraction (55 %) is normal.  --There is mild mitral regurgitation.  --There is mild tricuspid regurgitation.  --There is no pericardial effusion.    Holter 24  •  Normal sinus rhythm.  •  The mean HR was 74 bpm (55 bpm - 130 bpm).  •  Frequent PVCs were noted.  Total PVC burden approximately 2%.  •  Frequent PACs were noted.  Total PAC burden approximately 4%.  •  There were 0 pauses of >=3 seconds in length.  ________________________________  Review of systems: A 10 point review of system has been performed, and is negative except for what has been mentioned in the above history of present illness.     PAST MEDICAL & SURGICAL HISTORY:  Anemia      Acute promyelocytic leukemia      HTN (hypertension)      HLD (hyperlipidemia)      Hypothyroidism      Macular degeneration  right      Cataract  left eye      H/O lumpectomy  Left side in , performed by Dr. Samuels at       Fracture of femur  right ac right        FAMILY HISTORY:  Patient's father is   Hx of spine cancer    Patient's mother is   Hx of breast and bone cancer    Family history of cancer in brother  Colon cancer         SOCIAL HISTORY: The patient denies any tobacco abuse, alcohol abuse or illicit drug use.    ALLERGIES:  No Known Allergies    Home Medications:  ascorbic acid 500 mg oral tablet: 1 tab(s) orally once a day (2024 15:22)  aspirin 81 mg oral tablet: 1 tab(s) orally once a day (2024 15:22)  Caltrate 600 mg oral tablet: 1 tab(s) orally once a day (2024 15:22)  cholecalciferol 25 mcg (1000 intl units) oral tablet: 1 tab(s) orally once a day (2024 15:22)  escitalopram 10 mg oral tablet: 1 tab(s) orally once a day (2024 15:21)  fenofibrate 160 mg oral tablet: 1 tab(s) orally once a day (2024 15:22)  hydroCHLOROthiazide 12.5 mg oral tablet: 1 tab(s) orally once a day (2024 15:23)  levothyroxine 50 mcg (0.05 mg) oral tablet: 1 tab(s) orally once a day (2024 15:22)  losartan 50 mg oral tablet: 1 tab(s) orally once a day (in the evening) (2024 15:22)  PreserVision AREDS 2 oral capsule: 1 cap(s) orally once a day (2024 15:22)  Probiotic Formula oral capsule: 1 cap(s) orally once a day (2024 15:22)  Refresh ophthalmic solution: 1 drop(s) in each eye once a day as needed for (2024 15:22)  rosuvastatin 20 mg oral tablet: 1 tab(s) orally once a day (2024 15:22)    MEDICATIONS  (STANDING):  albuterol    90 MICROgram(s) HFA Inhaler 1 Puff(s) Inhalation every 6 hours  ascorbic acid 500 milliGRAM(s) Oral daily  atorvastatin 80 milliGRAM(s) Oral at bedtime  azithromycin   Tablet 500 milliGRAM(s) Oral daily  budesonide 160 MICROgram(s)/formoterol 4.5 MICROgram(s) Inhaler 2 Puff(s) Inhalation two times a day  cefTRIAXone Injectable. 1000 milliGRAM(s) IV Push every 24 hours  cholecalciferol 1000 Unit(s) Oral daily  escitalopram 10 milliGRAM(s) Oral daily  fenofibrate Tablet 145 milliGRAM(s) Oral daily  guaiFENesin  milliGRAM(s) Oral every 12 hours  levothyroxine 50 MICROGram(s) Oral daily  losartan 50 milliGRAM(s) Oral daily  methylPREDNISolone sodium succinate Injectable 40 milliGRAM(s) IV Push every 12 hours  oseltamivir 30 milliGRAM(s) Oral every 12 hours    MEDICATIONS  (PRN):  albuterol    90 MICROgram(s) HFA Inhaler 2 Puff(s) Inhalation every 6 hours PRN Shortness of Breath and/or Wheezing    Vital Signs Last 24 Hrs  T(C): 37.6 (15 Feb 2024 08:30), Max: 37.6 (2024 23:03)  T(F): 99.7 (15 Feb 2024 08:30), Max: 99.7 (2024 23:03)  HR: 74 (15 Feb 2024 08:30) (74 - 93)  BP: 120/47 (15 Feb 2024 08:30) (116/47 - 154/61)  BP(mean): --  RR: 18 (15 Feb 2024 08:30) (18 - 20)  SpO2: 91% (15 Feb 2024 08:30) (91% - 98%)    Parameters below as of 15 Feb 2024 08:30  Patient On (Oxygen Delivery Method): room air      I&O's Summary    2024 07:01  -  15 Feb 2024 07:00  --------------------------------------------------------  IN: 655 mL / OUT: 0 mL / NET: 655 mL      ________________________________  GENERAL APPEARANCE:  No acute distress  HEAD: normocephalic, atraumatic  NECK: supple, no jugular venous distention, no carotid bruit    HEART: Regular rate and rhythm, S1, S2 normal, 1/6 murmur    CHEST:  No anterior chest wall tenderness    LUNGS: Coarse    ABDOMEN: soft, nontender, nondistended, with positive bowel sounds appreciated  EXTREMITIES: no edema.   NEURO: Alert and oriented x3  PSYC:  Normal affect  SKIN:  Dry  ________________________________   TELEMETRY: Sinus rhythm    ECG: Sinus rhythm with LVH at 69 bpm.  No ST-T wave changes diagnostic of ischemia.    LABS:                        7.9    3.81  )-----------( 131      ( 15 Feb 2024 07:48 )             23.6             02-15    137  |  109<H>  |  40<H>  ----------------------------<  126<H>  4.2   |  26  |  0.98    Ca    7.3<L>      15 Feb 2024 07:48  Phos  2.6     02-15  Mg     1.9     -15    TPro  5.8<L>  /  Alb  2.4<L>  /  TBili  0.5  /  DBili  x   /  AST  34  /  ALT  36  /  AlkPhos  47  02-15      LIVER FUNCTIONS - ( 15 Feb 2024 07:48 )  Alb: 2.4 g/dL / Pro: 5.8 gm/dL / ALK PHOS: 47 U/L / ALT: 36 U/L / AST: 34 U/L / GGT: x         PT/INR - ( 2024 11:58 )   PT: 15.3 sec;   INR: 1.37 ratio         PTT - ( 2024 11:58 )  PTT:40.7 sec  Urinalysis Basic - ( 15 Feb 2024 07:48 )    Color: x / Appearance: x / SG: x / pH: x  Gluc: 126 mg/dL / Ketone: x  / Bili: x / Urobili: x   Blood: x / Protein: x / Nitrite: x   Leuk Esterase: x / RBC: x / WBC x   Sq Epi: x / Non Sq Epi: x / Bacteria: x        PT/INR - ( 2024 11:58 )   PT: 15.3 sec;   INR: 1.37 ratio         PTT - ( 2024 11:58 )  PTT:40.7 sec  Urinalysis Basic - ( 15 Feb 2024 07:48 )    Color: x / Appearance: x / SG: x / pH: x  Gluc: 126 mg/dL / Ketone: x  / Bili: x / Urobili: x   Blood: x / Protein: x / Nitrite: x   Leuk Esterase: x / RBC: x / WBC x   Sq Epi: x / Non Sq Epi: x / Bacteria: x    ________________________________    RADIOLOGY & ADDITIONAL STUDIES:   IMPRESSION: Limited chest CT due to motion artifact.    A few bilateral upper lung nodular and patchy opacities may be of   infectious etiology with endobronchial spread. A 3 month follow-up   noncontrast chest CT is recommended to ensure improvement/resolution.    Cardiomegaly.    Nonspecific bilateral interlobular septal thickening can be seen in the   setting of pulmonary edema. Clinical correlation is recommended.    ________________________________    ASSESSMENT:  Influenza multifocal pneumonia  Elevated cardiac enzymes-possibly demand ischemia versus non-STEMI  MDS with symptomatic anemia  Hypertension  Hyperlipidemia  History of breast cancer      Echocardiogram 2/15/2024   The left ventricle is normal in size, wall thickness, wall motion and   contractility as seen in limited views.   Estimated left ventricular ejection fraction is 55-60 %.   Normal appearing right ventricle structure and function.   The left atrium is mildly dilated.   Mild mitral regurgitation is present.   Trace tricuspid valve regurgitation is present.   Mild pulmonic valvular regurgitation is present.      PLAN:  In summary, this is a 88y Female with a past medical history of MDS, sent in for fevers, weakness and shortness of breath requiring PRBC transfusion for severe anemia.  Shortness of breath improved.  No chest discomfort.  EKG shows no acute abnormalities.  Cardiac enzymes trending down.  LVEF preserved.  Elevate cardiac send likely demand ischemia.  At present, no indication for coronary angiography.  Patient advised to have outpatient nuclear stress test.   Statin.  Add low-dose aspirin.  Monitor hemoglobin as outpatient.    ____________________________________________  (Dragon Dictation software used). Thank you for allowing me to participate in the care of your patient. Please contact me should any questions arise.    RIRI Hayes DO, [facility]  Office: [phone]     
Patient is a 88y old  Female who presents with a chief complaint of Anemia and congestion (15 Feb 2024 13:37)      HPI:   86 y/o female with a PMHx of MDS S/P CHemo, HTN, HLD, anemia, hard of hearing, presents to the ED S[facility] for blood transfusion. Pt states she was getting routine blood work done with her oncologist   Dr Kirk when her hgb was noted to be 6.1. Pt has had a fever recently tmax 100.8. +coughing. No blood in her stool reported. Denies chest pain, Dyspnea , palpitations, syncope, N/v/d, abd pain    In the ER patient received Vanco and Zosyn. (2024 14:58)    now feeling better after transfusion    she has progresses on procrti, procrit and filgrastim and vidaza   now on trasnfusion support   has know iron overload    last BMBX had shown 10% blasts          PAST MEDICAL & SURGICAL HISTORY:  Anemia      TN (hypertension)      HLD (hyperlipidemia)      Hypothyroidism      Macular degeneration  right      Cataract  left eye      H/O lumpectomy  Left side in , performed by Dr. Samuels at       Fracture of femur  right ac right          REVIEW OF SYSTEMS    General:	    fatigue  short of breath on exertion  no bleeding        FAMILY HISTORY:  Patient's father is   Hx of spine cancer    Patient's mother is   Hx of breast and bone cancer    Family history of cancer in brother  Colon cancer        Home Medications:  ascorbic acid 500 mg oral tablet: 1 tab(s) orally once a day (2024 15:22)  aspirin 81 mg oral tablet: 1 tab(s) orally once a day (2024 15:22)  Caltrate 600 mg oral tablet: 1 tab(s) orally once a day (2024 15:22)  cholecalciferol 25 mcg (1000 intl units) oral tablet: 1 tab(s) orally once a day (2024 15:22)  escitalopram 10 mg oral tablet: 1 tab(s) orally once a day (2024 15:21)  fenofibrate 160 mg oral tablet: 1 tab(s) orally once a day (2024 15:22)  hydroCHLOROthiazide 12.5 mg oral tablet: 1 tab(s) orally once a day (2024 15:23)  levothyroxine 50 mcg (0.05 mg) oral tablet: 1 tab(s) orally once a day (2024 15:22)  losartan 50 mg oral tablet: 1 tab(s) orally once a day (in the evening) (2024 15:22)  PreserVision AREDS 2 oral capsule: 1 cap(s) orally once a day (2024 15:22)  Probiotic Formula oral capsule: 1 cap(s) orally once a day (2024 15:22)  Refresh ophthalmic solution: 1 drop(s) in each eye once a day as needed for (2024 15:22)  rosuvastatin 20 mg oral tablet: 1 tab(s) orally once a day (2024 15:22)      MEDICATIONS  (STANDING):  albuterol    90 MICROgram(s) HFA Inhaler 1 Puff(s) Inhalation every 6 hours  ascorbic acid 500 milliGRAM(s) Oral daily  atorvastatin 80 milliGRAM(s) Oral at bedtime  azithromycin   Tablet 500 milliGRAM(s) Oral daily  budesonide 160 MICROgram(s)/formoterol 4.5 MICROgram(s) Inhaler 2 Puff(s) Inhalation two times a day  cefTRIAXone Injectable. 1000 milliGRAM(s) IV Push every 24 hours  cholecalciferol 1000 Unit(s) Oral daily  escitalopram 10 milliGRAM(s) Oral daily  fenofibrate Tablet 145 milliGRAM(s) Oral daily  guaiFENesin  milliGRAM(s) Oral every 12 hours  levothyroxine 50 MICROGram(s) Oral daily  losartan 50 milliGRAM(s) Oral daily  methylPREDNISolone sodium succinate Injectable 40 milliGRAM(s) IV Push every 12 hours  oseltamivir 30 milliGRAM(s) Oral every 12 hours    MEDICATIONS  (PRN):  albuterol    90 MICROgram(s) HFA Inhaler 2 Puff(s) Inhalation every 6 hours PRN Shortness of Breath and/or Wheezing      PHYSICAL EXAM:  Vital Signs Last 24 Hrs  T(C): 37.6 (15 Feb 2024 08:30), Max: 37.6 (2024 23:03)  T(F): 99.7 (15 Feb 2024 08:30), Max: 99.7 (2024 23:03)  HR: 74 (15 Feb 2024 08:30) (74 - 84)  BP: 120/47 (15 Feb 2024 08:30) (116/47 - 140/51)  BP(mean): --  RR: 18 (15 Feb 2024 08:30) (18 - 18)  SpO2: 91% (15 Feb 2024 08:30) (91% - 98%)    Parameters below as of 15 Feb 2024 08:30  Patient On (Oxygen Delivery Method): room air      Gen:     pale  comfortable  hard of hearing  chest clear        LABS:                        7.9    3.81  )-----------( 131      ( 15 Feb 2024 07:48 )             23.6     15 Feb 2024 07:48    137    |  109    |  40     ----------------------------<  126    4.2     |  26     |  0.98     Ca    7.3        15 Feb 2024 07:48  Phos  2.6       15 Feb 2024 07:48  Mg     1.9       15 Feb 2024 07:48    TPro  5.8    /  Alb  2.4    /  TBili  0.5    /  DBili  x      /  AST  34     /  ALT  36     /  AlkPhos  47     15 Feb 2024 07:48    LIVER FUNCTIONS - ( 15 Feb 2024 07:48 )  Alb: 2.4 g/dL / Pro: 5.8 gm/dL / ALK PHOS: 47 U/L / ALT: 36 U/L / AST: 34 U/L / GGT: x           PT/INR - ( 2024 11:58 )   PT: 15.3 sec;   INR: 1.37 ratio         PTT - ( 2024 11:58 )  PTT:40.7 sec    Culture - Blood (collected 24 @ 11:58)  Source: .Blood Blood-Peripheral  Preliminary Report (02-15-24 @ 17:01):    No growth at 24 hours    Culture - Blood (collected 24 @ 11:58)  Source: .Blood Blood-Peripheral  Preliminary Report (02-15-24 @ 17:01):    No growth at 24 hours        RADIOLOGY & ADDITIONAL STUDIES:

## 2024-02-15 NOTE — DIETITIAN INITIAL EVALUATION ADULT - IDEAL BODY WEIGHT (KG)
Location of patient: [address] [facility] [address] pappas from [place] [facility] at [facility] with [employer]. Subjective: Caller states \"chest pain, lightheaded, hiccups\"     Current Symptoms: see above, middle chest pain, tightness, feeling lightheaded    Onset:  over a week, told doc and got prescriptions, worsened yesterday    Associated Symptoms: diarrhea    Pain Severity: 8/10; pressure; constant    Temperature: n/a     What has been tried: nothing    LMP: NA Pregnant: NA    Recommended disposition: Call  Now    Care advice provided, patient verbalizes understanding; denies any other questions or concerns; instructed to call back for any new or worsening symptoms. Patient/caller agrees to calling 911    Attention Provider: Thank you for allowing me to participate in the care of your patient. The patient was connected to triage in response to information provided to the [facility]. Please do not respond through this encounter as the response is not directed to a shared pool.     Reason for Disposition   Chest pain lasting longer than 5 minutes and ANY of the following:* Over 40years old* Over 27years old and at least one cardiac risk factor (e.g., diabetes mellitus, high blood pressure, high cholesterol, smoker, or strong family history of heart disease)* History of heart disease (i.e., angina, heart attack, heart failure, bypass surgery, takes nitroglycerin)* Pain is crushing, pressure-like, or heavy    Protocols used: Chest Pain-ADULT-OH
54.4

## 2024-02-15 NOTE — DIETITIAN INITIAL EVALUATION ADULT - NS FNS DIET ORDER
Diet, Regular:   Low Fat (LOWFAT)     Special Instructions for Nursing:  Low Fat (LOWFAT) (02-14-24 @ 14:45)

## 2024-02-15 NOTE — DIETITIAN INITIAL EVALUATION ADULT - PERTINENT LABORATORY DATA
02-15    137  |  109<H>  |  40<H>  ----------------------------<  126<H>  4.2   |  26  |  0.98    Ca    7.3<L>      15 Feb 2024 07:48  Phos  2.6     02-15  Mg     1.9     02-15    TPro  5.8<L>  /  Alb  2.4<L>  /  TBili  0.5  /  DBili  x   /  AST  34  /  ALT  36  /  AlkPhos  47  02-15

## 2024-02-15 NOTE — DISCHARGE NOTE NURSING/CASE MANAGEMENT/SOCIAL WORK - PATIENT PORTAL LINK FT
You can access the FollowMyHealth Patient Portal offered by [facility] by registering at the following website: [email or web address]. By joining [facility]’s FollowMyHealth portal, you will also be able to view your health information using other applications (apps) compatible with our system.

## 2024-02-15 NOTE — CONSULT NOTE ADULT - ASSESSMENT
1. MDS with increased blasts   refractory to treatments  patient want to be on transfusional support only without further treatment which is reasonable    2. iron overload  not a candidate for iron chelation given high grade MDS and poor prognosis    FU with Dr Kirk after DC

## 2024-02-15 NOTE — DIETITIAN INITIAL EVALUATION ADULT - NSFNSGIIOFT_GEN_A_CORE
I&O's Detail    14 Feb 2024 07:01  -  15 Feb 2024 07:00  --------------------------------------------------------  IN:    PRBCs (Packed Red Blood Cells): 655 mL  Total IN: 655 mL    OUT:  Total OUT: 0 mL    Total NET: 655 mL

## 2024-02-15 NOTE — DIETITIAN INITIAL EVALUATION ADULT - OTHER INFO
86 y/o female with a PMHx of MDS S/P chemo, HTN, HLD, anemia, hard of hearing, presents to the ED [facility] for blood transfusion.   Admit for left middle lobe PNA/ Influenza A, acute on chronic anemia of MDS    Reports good appetite since admit (x 1 day). Endorses UBW of 112# x 2 weeks ago; bed scale wt of 107# taken by RD on 2/15/24. Unintentional wt loss of 5# / 4.5% wt loss x 2 weeks ; severe & clinically significant. NFPE reveals moderate-severe muscle/ fat wasting - appears thin, frail, and evelyn. Currently on low fat diet - ? why pt is on low fat diet. Liberalize diet to regular to maximize caloric and nutrient intake; will add ensure + HP shake BID (350kcal, 20g protein). Encourage protein-rich foods, maximize food preferences. See below for other recommendations.

## 2024-02-15 NOTE — DISCHARGE NOTE NURSING/CASE MANAGEMENT/SOCIAL WORK - NSDCVIVACCINE_GEN_ALL_CORE_FT
influenza, injectable, quadrivalent, preservative free; 08-Jan-2020 16:16; Milana Shook (MARK); [employer]; [id number] (Exp. Date: 30-Jun-2020); IntraMuscular; Deltoid Right.; 0.5 milliLiter(s); VIS (VIS Published: 15-Aug-2019, VIS Presented: 08-Jan-2020);   
No

## 2024-02-15 NOTE — DIETITIAN INITIAL EVALUATION ADULT - PERTINENT MEDS FT
MEDICATIONS  (STANDING):  albuterol    90 MICROgram(s) HFA Inhaler 1 Puff(s) Inhalation every 6 hours  ascorbic acid 500 milliGRAM(s) Oral daily  atorvastatin 80 milliGRAM(s) Oral at bedtime  azithromycin   Tablet 500 milliGRAM(s) Oral daily  budesonide 160 MICROgram(s)/formoterol 4.5 MICROgram(s) Inhaler 2 Puff(s) Inhalation two times a day  cefTRIAXone Injectable. 1000 milliGRAM(s) IV Push every 24 hours  cholecalciferol 1000 Unit(s) Oral daily  escitalopram 10 milliGRAM(s) Oral daily  fenofibrate Tablet 145 milliGRAM(s) Oral daily  guaiFENesin  milliGRAM(s) Oral every 12 hours  levothyroxine 50 MICROGram(s) Oral daily  losartan 50 milliGRAM(s) Oral daily  methylPREDNISolone sodium succinate Injectable 40 milliGRAM(s) IV Push every 12 hours  oseltamivir 30 milliGRAM(s) Oral every 12 hours    MEDICATIONS  (PRN):  albuterol    90 MICROgram(s) HFA Inhaler 2 Puff(s) Inhalation every 6 hours PRN Shortness of Breath and/or Wheezing

## 2024-02-15 NOTE — DIETITIAN NUTRITION RISK NOTIFICATION - TREATMENT: THE FOLLOWING DIET HAS BEEN RECOMMENDED
Diet, Regular:   Low Fat (LOWFAT)     Special Instructions for Nursing:  Low Fat (LOWFAT) (02-14-24 @ 14:45) [Active]

## 2024-02-22 DIAGNOSIS — Z79.899 OTHER LONG TERM (CURRENT) DRUG THERAPY: ICD-10-CM

## 2024-02-22 DIAGNOSIS — C92.40 ACUTE PROMYELOCYTIC LEUKEMIA, NOT HAVING ACHIEVED REMISSION: ICD-10-CM

## 2024-02-22 DIAGNOSIS — D46.9 MYELODYSPLASTIC SYNDROME, UNSPECIFIED: ICD-10-CM

## 2024-02-22 DIAGNOSIS — D63.0 ANEMIA IN NEOPLASTIC DISEASE: ICD-10-CM

## 2024-02-22 DIAGNOSIS — D84.9 IMMUNODEFICIENCY, UNSPECIFIED: ICD-10-CM

## 2024-02-22 DIAGNOSIS — J10.00 INFLUENZA DUE TO OTHER IDENTIFIED INFLUENZA VIRUS WITH UNSPECIFIED TYPE OF PNEUMONIA: ICD-10-CM

## 2024-02-22 DIAGNOSIS — I37.1 NONRHEUMATIC PULMONARY VALVE INSUFFICIENCY: ICD-10-CM

## 2024-02-22 DIAGNOSIS — I24.89 OTHER FORMS OF ACUTE ISCHEMIC HEART DISEASE: ICD-10-CM

## 2024-02-22 DIAGNOSIS — E03.9 HYPOTHYROIDISM, UNSPECIFIED: ICD-10-CM

## 2024-02-22 DIAGNOSIS — H91.93 UNSPECIFIED HEARING LOSS, BILATERAL: ICD-10-CM

## 2024-02-22 DIAGNOSIS — Z53.29 PROCEDURE AND TREATMENT NOT CARRIED OUT BECAUSE OF PATIENT'S DECISION FOR OTHER REASONS: ICD-10-CM

## 2024-02-22 DIAGNOSIS — Z79.890 HORMONE REPLACEMENT THERAPY: ICD-10-CM

## 2024-02-22 DIAGNOSIS — H59.022 CATARACT (LENS) FRAGMENTS IN EYE FOLLOWING CATARACT SURGERY, LEFT EYE: ICD-10-CM

## 2024-02-22 DIAGNOSIS — I08.1 RHEUMATIC DISORDERS OF BOTH MITRAL AND TRICUSPID VALVES: ICD-10-CM

## 2024-02-22 DIAGNOSIS — E78.5 HYPERLIPIDEMIA, UNSPECIFIED: ICD-10-CM

## 2024-02-29 ENCOUNTER — OUTPATIENT (OUTPATIENT)
Dept: OUTPATIENT SERVICES | Facility: HOSPITAL | Age: 89
LOS: 1 days | Discharge: HOME | End: 2024-02-29
Payer: MEDICARE

## 2024-02-29 DIAGNOSIS — D64.9 ANEMIA, UNSPECIFIED: ICD-10-CM

## 2024-02-29 DIAGNOSIS — S72.90XA UNSPECIFIED FRACTURE OF UNSPECIFIED FEMUR, INITIAL ENCOUNTER FOR CLOSED FRACTURE: Chronic | ICD-10-CM

## 2024-02-29 DIAGNOSIS — Z98.890 OTHER SPECIFIED POSTPROCEDURAL STATES: Chronic | ICD-10-CM

## 2024-02-29 LAB — BLD GP AB SCN SERPL QL: SIGNIFICANT CHANGE UP

## 2024-02-29 PROCEDURE — 86850 RBC ANTIBODY SCREEN: CPT

## 2024-02-29 PROCEDURE — 86901 BLOOD TYPING SEROLOGIC RH(D): CPT

## 2024-02-29 PROCEDURE — 86900 BLOOD TYPING SEROLOGIC ABO: CPT

## 2024-03-01 ENCOUNTER — OUTPATIENT (OUTPATIENT)
Dept: OUTPATIENT SERVICES | Facility: HOSPITAL | Age: 89
LOS: 1 days | Discharge: HOME | End: 2024-03-01
Payer: MEDICARE

## 2024-03-01 VITALS
OXYGEN SATURATION: 100 % | RESPIRATION RATE: 17 BRPM | TEMPERATURE: 97 F | SYSTOLIC BLOOD PRESSURE: 143 MMHG | DIASTOLIC BLOOD PRESSURE: 59 MMHG | HEART RATE: 81 BPM

## 2024-03-01 VITALS
RESPIRATION RATE: 17 BRPM | SYSTOLIC BLOOD PRESSURE: 138 MMHG | OXYGEN SATURATION: 100 % | HEART RATE: 77 BPM | DIASTOLIC BLOOD PRESSURE: 54 MMHG | TEMPERATURE: 99 F

## 2024-03-01 DIAGNOSIS — Z98.890 OTHER SPECIFIED POSTPROCEDURAL STATES: Chronic | ICD-10-CM

## 2024-03-01 DIAGNOSIS — D64.9 ANEMIA, UNSPECIFIED: ICD-10-CM

## 2024-03-01 DIAGNOSIS — S72.90XA UNSPECIFIED FRACTURE OF UNSPECIFIED FEMUR, INITIAL ENCOUNTER FOR CLOSED FRACTURE: Chronic | ICD-10-CM

## 2024-03-01 PROCEDURE — 86923 COMPATIBILITY TEST ELECTRIC: CPT

## 2024-03-01 PROCEDURE — 36430 TRANSFUSION BLD/BLD COMPNT: CPT

## 2024-03-01 PROCEDURE — P9016: CPT

## 2024-03-01 RX ORDER — LOSARTAN POTASSIUM 100 MG/1
1 TABLET, FILM COATED ORAL
Qty: 0 | Refills: 0 | DISCHARGE

## 2024-03-01 RX ORDER — DIPHENHYDRAMINE HCL 50 MG
25 CAPSULE ORAL ONCE
Refills: 0 | Status: COMPLETED | OUTPATIENT
Start: 2024-03-01 | End: 2024-03-01

## 2024-03-01 RX ORDER — ACETAMINOPHEN 500 MG
650 TABLET ORAL ONCE
Refills: 0 | Status: COMPLETED | OUTPATIENT
Start: 2024-03-01 | End: 2024-03-01

## 2024-03-01 RX ADMIN — Medication 650 MILLIGRAM(S): at 07:20

## 2024-03-01 RX ADMIN — Medication 650 MILLIGRAM(S): at 07:40

## 2024-03-01 RX ADMIN — Medication 25 MILLIGRAM(S): at 07:20

## 2024-03-18 ENCOUNTER — OUTPATIENT (OUTPATIENT)
Dept: OUTPATIENT SERVICES | Facility: HOSPITAL | Age: 89
LOS: 1 days | Discharge: HOME | End: 2024-03-18
Payer: MEDICARE

## 2024-03-18 DIAGNOSIS — S72.90XA UNSPECIFIED FRACTURE OF UNSPECIFIED FEMUR, INITIAL ENCOUNTER FOR CLOSED FRACTURE: Chronic | ICD-10-CM

## 2024-03-18 DIAGNOSIS — D64.9 ANEMIA, UNSPECIFIED: ICD-10-CM

## 2024-03-18 LAB — BLD GP AB SCN SERPL QL: SIGNIFICANT CHANGE UP

## 2024-03-18 PROCEDURE — 86900 BLOOD TYPING SEROLOGIC ABO: CPT

## 2024-03-18 PROCEDURE — 36415 COLL VENOUS BLD VENIPUNCTURE: CPT

## 2024-03-18 PROCEDURE — 86850 RBC ANTIBODY SCREEN: CPT

## 2024-03-18 PROCEDURE — 86901 BLOOD TYPING SEROLOGIC RH(D): CPT

## 2024-03-18 PROCEDURE — 86923 COMPATIBILITY TEST ELECTRIC: CPT

## 2024-03-19 ENCOUNTER — OUTPATIENT (OUTPATIENT)
Dept: OUTPATIENT SERVICES | Facility: HOSPITAL | Age: 89
LOS: 1 days | Discharge: HOME | End: 2024-03-19
Payer: MEDICARE

## 2024-03-19 VITALS
SYSTOLIC BLOOD PRESSURE: 109 MMHG | HEART RATE: 76 BPM | RESPIRATION RATE: 19 BRPM | TEMPERATURE: 97 F | OXYGEN SATURATION: 99 % | DIASTOLIC BLOOD PRESSURE: 47 MMHG

## 2024-03-19 VITALS
OXYGEN SATURATION: 100 % | DIASTOLIC BLOOD PRESSURE: 55 MMHG | RESPIRATION RATE: 17 BRPM | HEART RATE: 78 BPM | TEMPERATURE: 98 F | SYSTOLIC BLOOD PRESSURE: 156 MMHG

## 2024-03-19 DIAGNOSIS — S72.90XA UNSPECIFIED FRACTURE OF UNSPECIFIED FEMUR, INITIAL ENCOUNTER FOR CLOSED FRACTURE: Chronic | ICD-10-CM

## 2024-03-19 DIAGNOSIS — D64.9 ANEMIA, UNSPECIFIED: ICD-10-CM

## 2024-03-19 DIAGNOSIS — Z98.890 OTHER SPECIFIED POSTPROCEDURAL STATES: Chronic | ICD-10-CM

## 2024-03-19 PROCEDURE — 36430 TRANSFUSION BLD/BLD COMPNT: CPT

## 2024-03-19 PROCEDURE — P9016: CPT

## 2024-03-19 RX ORDER — DIPHENHYDRAMINE HCL 50 MG
25 CAPSULE ORAL ONCE
Refills: 0 | Status: COMPLETED | OUTPATIENT
Start: 2024-03-19 | End: 2024-03-19

## 2024-03-19 RX ORDER — ACETAMINOPHEN 500 MG
650 TABLET ORAL ONCE
Refills: 0 | Status: COMPLETED | OUTPATIENT
Start: 2024-03-19 | End: 2024-03-19

## 2024-03-19 RX ADMIN — Medication 650 MILLIGRAM(S): at 07:24

## 2024-03-19 RX ADMIN — Medication 25 MILLIGRAM(S): at 07:24

## 2024-03-19 RX ADMIN — Medication 650 MILLIGRAM(S): at 07:55

## 2024-03-28 ENCOUNTER — OUTPATIENT (OUTPATIENT)
Dept: OUTPATIENT SERVICES | Facility: HOSPITAL | Age: 89
LOS: 1 days | Discharge: HOME | End: 2024-03-28
Payer: MEDICARE

## 2024-03-28 DIAGNOSIS — D64.9 ANEMIA, UNSPECIFIED: ICD-10-CM

## 2024-03-28 LAB — BLD GP AB SCN SERPL QL: SIGNIFICANT CHANGE UP

## 2024-03-28 PROCEDURE — 86923 COMPATIBILITY TEST ELECTRIC: CPT

## 2024-03-28 PROCEDURE — 86901 BLOOD TYPING SEROLOGIC RH(D): CPT

## 2024-03-28 PROCEDURE — 86850 RBC ANTIBODY SCREEN: CPT

## 2024-03-28 PROCEDURE — 36415 COLL VENOUS BLD VENIPUNCTURE: CPT

## 2024-03-28 PROCEDURE — 86900 BLOOD TYPING SEROLOGIC ABO: CPT

## 2024-03-29 ENCOUNTER — OUTPATIENT (OUTPATIENT)
Dept: OUTPATIENT SERVICES | Facility: HOSPITAL | Age: 89
LOS: 1 days | Discharge: HOME | End: 2024-03-29
Payer: MEDICARE

## 2024-03-29 VITALS
HEART RATE: 80 BPM | SYSTOLIC BLOOD PRESSURE: 151 MMHG | OXYGEN SATURATION: 98 % | DIASTOLIC BLOOD PRESSURE: 58 MMHG | RESPIRATION RATE: 17 BRPM | TEMPERATURE: 98 F

## 2024-03-29 VITALS
TEMPERATURE: 98 F | DIASTOLIC BLOOD PRESSURE: 45 MMHG | RESPIRATION RATE: 18 BRPM | HEART RATE: 73 BPM | OXYGEN SATURATION: 100 % | SYSTOLIC BLOOD PRESSURE: 127 MMHG

## 2024-03-29 DIAGNOSIS — Z98.890 OTHER SPECIFIED POSTPROCEDURAL STATES: Chronic | ICD-10-CM

## 2024-03-29 DIAGNOSIS — D64.9 ANEMIA, UNSPECIFIED: ICD-10-CM

## 2024-03-29 DIAGNOSIS — S72.90XA UNSPECIFIED FRACTURE OF UNSPECIFIED FEMUR, INITIAL ENCOUNTER FOR CLOSED FRACTURE: Chronic | ICD-10-CM

## 2024-03-29 PROCEDURE — 36430 TRANSFUSION BLD/BLD COMPNT: CPT

## 2024-03-29 PROCEDURE — P9016: CPT

## 2024-03-29 RX ORDER — DIPHENHYDRAMINE HCL 50 MG
25 CAPSULE ORAL ONCE
Refills: 0 | Status: COMPLETED | OUTPATIENT
Start: 2024-03-29 | End: 2024-03-29

## 2024-03-29 RX ORDER — ACETAMINOPHEN 500 MG
650 TABLET ORAL ONCE
Refills: 0 | Status: COMPLETED | OUTPATIENT
Start: 2024-03-29 | End: 2024-03-29

## 2024-03-29 RX ADMIN — Medication 650 MILLIGRAM(S): at 08:28

## 2024-03-29 RX ADMIN — Medication 25 MILLIGRAM(S): at 07:31

## 2024-03-29 RX ADMIN — Medication 650 MILLIGRAM(S): at 07:31

## 2024-04-15 NOTE — PROGRESS NOTE ADULT - REASON FOR ADMISSION
right hip fracture
Former smoker

## 2024-04-16 ENCOUNTER — OUTPATIENT (OUTPATIENT)
Dept: OUTPATIENT SERVICES | Facility: HOSPITAL | Age: 89
LOS: 1 days | Discharge: HOME | End: 2024-04-16
Payer: MEDICARE

## 2024-04-16 DIAGNOSIS — D64.9 ANEMIA, UNSPECIFIED: ICD-10-CM

## 2024-04-16 DIAGNOSIS — Z98.890 OTHER SPECIFIED POSTPROCEDURAL STATES: Chronic | ICD-10-CM

## 2024-04-16 DIAGNOSIS — S72.90XA UNSPECIFIED FRACTURE OF UNSPECIFIED FEMUR, INITIAL ENCOUNTER FOR CLOSED FRACTURE: Chronic | ICD-10-CM

## 2024-04-16 LAB
BLD GP AB SCN SERPL QL: SIGNIFICANT CHANGE UP
DAT IGG-SP REAG RBC-IMP: ABNORMAL
DIR ANTIGLOB POLYSPECIFIC INTERPRETATION: ABNORMAL

## 2024-04-16 PROCEDURE — 86077 PHYS BLOOD BANK SERV XMATCH: CPT

## 2024-04-16 PROCEDURE — 86921 COMPATIBILITY TEST INCUBATE: CPT

## 2024-04-16 PROCEDURE — 86922 COMPATIBILITY TEST ANTIGLOB: CPT

## 2024-04-16 PROCEDURE — 86905 BLOOD TYPING RBC ANTIGENS: CPT | Mod: XU

## 2024-04-16 PROCEDURE — 36415 COLL VENOUS BLD VENIPUNCTURE: CPT

## 2024-04-16 PROCEDURE — 86900 BLOOD TYPING SEROLOGIC ABO: CPT | Mod: XU

## 2024-04-16 PROCEDURE — 0001U RBC DNA HEA 35 AG 11 BLD GRP: CPT

## 2024-04-16 PROCEDURE — 86901 BLOOD TYPING SEROLOGIC RH(D): CPT | Mod: XU

## 2024-04-16 PROCEDURE — 86920 COMPATIBILITY TEST SPIN: CPT

## 2024-04-16 PROCEDURE — 86870 RBC ANTIBODY IDENTIFICATION: CPT

## 2024-04-16 PROCEDURE — 86850 RBC ANTIBODY SCREEN: CPT

## 2024-04-16 PROCEDURE — 86880 COOMBS TEST DIRECT: CPT

## 2024-04-17 LAB
ALLERGY+IMMUNOLOGY DIAG STUDY NOTE: SIGNIFICANT CHANGE UP
DAT C3-SP REAG RBC QL: SIGNIFICANT CHANGE UP

## 2024-04-18 ENCOUNTER — OUTPATIENT (OUTPATIENT)
Dept: OUTPATIENT SERVICES | Facility: HOSPITAL | Age: 89
LOS: 1 days | Discharge: HOME | End: 2024-04-18
Payer: MEDICARE

## 2024-04-18 VITALS
RESPIRATION RATE: 19 BRPM | DIASTOLIC BLOOD PRESSURE: 54 MMHG | TEMPERATURE: 97 F | SYSTOLIC BLOOD PRESSURE: 137 MMHG | HEART RATE: 89 BPM | OXYGEN SATURATION: 100 %

## 2024-04-18 VITALS
OXYGEN SATURATION: 100 % | DIASTOLIC BLOOD PRESSURE: 50 MMHG | TEMPERATURE: 99 F | RESPIRATION RATE: 19 BRPM | SYSTOLIC BLOOD PRESSURE: 158 MMHG | HEART RATE: 79 BPM

## 2024-04-18 DIAGNOSIS — D64.9 ANEMIA, UNSPECIFIED: ICD-10-CM

## 2024-04-18 DIAGNOSIS — Z98.890 OTHER SPECIFIED POSTPROCEDURAL STATES: Chronic | ICD-10-CM

## 2024-04-18 DIAGNOSIS — S72.90XA UNSPECIFIED FRACTURE OF UNSPECIFIED FEMUR, INITIAL ENCOUNTER FOR CLOSED FRACTURE: Chronic | ICD-10-CM

## 2024-04-18 PROCEDURE — P9016: CPT

## 2024-04-18 PROCEDURE — 36430 TRANSFUSION BLD/BLD COMPNT: CPT

## 2024-04-18 RX ORDER — DIPHENHYDRAMINE HCL 50 MG
25 CAPSULE ORAL ONCE
Refills: 0 | Status: COMPLETED | OUTPATIENT
Start: 2024-04-18 | End: 2024-04-18

## 2024-04-18 RX ORDER — ACETAMINOPHEN 500 MG
650 TABLET ORAL ONCE
Refills: 0 | Status: COMPLETED | OUTPATIENT
Start: 2024-04-18 | End: 2024-04-18

## 2024-04-18 RX ADMIN — Medication 25 MILLIGRAM(S): at 08:27

## 2024-04-18 RX ADMIN — Medication 650 MILLIGRAM(S): at 08:35

## 2024-04-18 RX ADMIN — Medication 650 MILLIGRAM(S): at 08:27

## 2024-05-02 ENCOUNTER — OUTPATIENT (OUTPATIENT)
Dept: OUTPATIENT SERVICES | Facility: HOSPITAL | Age: 89
LOS: 1 days | Discharge: HOME | End: 2024-05-02
Payer: MEDICARE

## 2024-05-02 DIAGNOSIS — D64.9 ANEMIA, UNSPECIFIED: ICD-10-CM

## 2024-05-02 DIAGNOSIS — S72.90XA UNSPECIFIED FRACTURE OF UNSPECIFIED FEMUR, INITIAL ENCOUNTER FOR CLOSED FRACTURE: Chronic | ICD-10-CM

## 2024-05-02 DIAGNOSIS — Z98.890 OTHER SPECIFIED POSTPROCEDURAL STATES: Chronic | ICD-10-CM

## 2024-05-02 LAB
BLD GP AB SCN SERPL QL: SIGNIFICANT CHANGE UP
DAT C3-SP REAG RBC QL: SIGNIFICANT CHANGE UP
DAT IGG-SP REAG RBC-IMP: ABNORMAL
DIR ANTIGLOB POLYSPECIFIC INTERPRETATION: ABNORMAL
HCT VFR BLD CALC: 20.3 % — CRITICAL LOW (ref 34.5–45)
HGB BLD-MCNC: 6.7 G/DL — CRITICAL LOW (ref 11.5–15.5)
MCHC RBC-ENTMCNC: 29.4 PG — SIGNIFICANT CHANGE UP (ref 27–34)
MCHC RBC-ENTMCNC: 33 GM/DL — SIGNIFICANT CHANGE UP (ref 32–36)
MCV RBC AUTO: 89 FL — SIGNIFICANT CHANGE UP (ref 80–100)
PLATELET # BLD AUTO: 114 K/UL — LOW (ref 150–400)
RBC # BLD: 2.28 M/UL — LOW (ref 3.8–5.2)
RBC # FLD: 14.5 % — SIGNIFICANT CHANGE UP (ref 10.3–14.5)
WBC # BLD: 4.72 K/UL — SIGNIFICANT CHANGE UP (ref 3.8–10.5)
WBC # FLD AUTO: 4.72 K/UL — SIGNIFICANT CHANGE UP (ref 3.8–10.5)

## 2024-05-02 PROCEDURE — 85027 COMPLETE CBC AUTOMATED: CPT

## 2024-05-02 PROCEDURE — 86902 BLOOD TYPE ANTIGEN DONOR EA: CPT

## 2024-05-02 PROCEDURE — 86880 COOMBS TEST DIRECT: CPT

## 2024-05-02 PROCEDURE — 86920 COMPATIBILITY TEST SPIN: CPT

## 2024-05-02 PROCEDURE — 86900 BLOOD TYPING SEROLOGIC ABO: CPT

## 2024-05-02 PROCEDURE — 36415 COLL VENOUS BLD VENIPUNCTURE: CPT

## 2024-05-02 PROCEDURE — 86901 BLOOD TYPING SEROLOGIC RH(D): CPT

## 2024-05-02 PROCEDURE — 86922 COMPATIBILITY TEST ANTIGLOB: CPT

## 2024-05-02 PROCEDURE — 86850 RBC ANTIBODY SCREEN: CPT

## 2024-05-02 PROCEDURE — 86921 COMPATIBILITY TEST INCUBATE: CPT

## 2024-05-03 ENCOUNTER — OUTPATIENT (OUTPATIENT)
Dept: OUTPATIENT SERVICES | Facility: HOSPITAL | Age: 89
LOS: 1 days | Discharge: HOME | End: 2024-05-03
Payer: MEDICARE

## 2024-05-03 VITALS
RESPIRATION RATE: 19 BRPM | OXYGEN SATURATION: 99 % | TEMPERATURE: 97 F | SYSTOLIC BLOOD PRESSURE: 108 MMHG | HEART RATE: 75 BPM | DIASTOLIC BLOOD PRESSURE: 41 MMHG

## 2024-05-03 VITALS
DIASTOLIC BLOOD PRESSURE: 46 MMHG | OXYGEN SATURATION: 99 % | RESPIRATION RATE: 19 BRPM | SYSTOLIC BLOOD PRESSURE: 131 MMHG | HEART RATE: 71 BPM | TEMPERATURE: 98 F

## 2024-05-03 DIAGNOSIS — Z98.890 OTHER SPECIFIED POSTPROCEDURAL STATES: Chronic | ICD-10-CM

## 2024-05-03 DIAGNOSIS — D64.9 ANEMIA, UNSPECIFIED: ICD-10-CM

## 2024-05-03 DIAGNOSIS — S72.90XA UNSPECIFIED FRACTURE OF UNSPECIFIED FEMUR, INITIAL ENCOUNTER FOR CLOSED FRACTURE: Chronic | ICD-10-CM

## 2024-05-03 PROCEDURE — 36430 TRANSFUSION BLD/BLD COMPNT: CPT

## 2024-05-03 PROCEDURE — P9040: CPT

## 2024-05-03 RX ORDER — ACETAMINOPHEN 500 MG
650 TABLET ORAL ONCE
Refills: 0 | Status: COMPLETED | OUTPATIENT
Start: 2024-05-03 | End: 2024-05-03

## 2024-05-03 RX ORDER — DIPHENHYDRAMINE HCL 50 MG
25 CAPSULE ORAL ONCE
Refills: 0 | Status: COMPLETED | OUTPATIENT
Start: 2024-05-03 | End: 2024-05-03

## 2024-05-03 RX ADMIN — Medication 25 MILLIGRAM(S): at 07:12

## 2024-05-03 RX ADMIN — Medication 650 MILLIGRAM(S): at 07:40

## 2024-05-03 RX ADMIN — Medication 650 MILLIGRAM(S): at 07:12

## 2024-05-10 ENCOUNTER — OUTPATIENT (OUTPATIENT)
Dept: OUTPATIENT SERVICES | Facility: HOSPITAL | Age: 89
LOS: 1 days | Discharge: HOME | End: 2024-05-10
Payer: MEDICARE

## 2024-05-10 DIAGNOSIS — D64.9 ANEMIA, UNSPECIFIED: ICD-10-CM

## 2024-05-10 DIAGNOSIS — S72.90XA UNSPECIFIED FRACTURE OF UNSPECIFIED FEMUR, INITIAL ENCOUNTER FOR CLOSED FRACTURE: Chronic | ICD-10-CM

## 2024-05-10 DIAGNOSIS — Z98.890 OTHER SPECIFIED POSTPROCEDURAL STATES: Chronic | ICD-10-CM

## 2024-05-10 LAB — BLD GP AB SCN SERPL QL: SIGNIFICANT CHANGE UP

## 2024-05-10 PROCEDURE — 86901 BLOOD TYPING SEROLOGIC RH(D): CPT

## 2024-05-10 PROCEDURE — 86850 RBC ANTIBODY SCREEN: CPT

## 2024-05-10 PROCEDURE — 86900 BLOOD TYPING SEROLOGIC ABO: CPT

## 2024-05-10 PROCEDURE — 36415 COLL VENOUS BLD VENIPUNCTURE: CPT

## 2024-05-13 ENCOUNTER — INPATIENT (INPATIENT)
Facility: HOSPITAL | Age: 89
LOS: 2 days | Discharge: HOME HEALTH | DRG: 380 | End: 2024-05-16
Attending: STUDENT IN AN ORGANIZED HEALTH CARE EDUCATION/TRAINING PROGRAM | Admitting: SURGERY
Payer: MEDICARE

## 2024-05-13 ENCOUNTER — OUTPATIENT (OUTPATIENT)
Dept: OUTPATIENT SERVICES | Facility: HOSPITAL | Age: 89
LOS: 1 days | Discharge: HOME | End: 2024-05-13
Payer: MEDICARE

## 2024-05-13 VITALS
DIASTOLIC BLOOD PRESSURE: 36 MMHG | HEART RATE: 84 BPM | RESPIRATION RATE: 19 BRPM | TEMPERATURE: 97 F | SYSTOLIC BLOOD PRESSURE: 119 MMHG | OXYGEN SATURATION: 94 %

## 2024-05-13 VITALS
TEMPERATURE: 98 F | SYSTOLIC BLOOD PRESSURE: 155 MMHG | DIASTOLIC BLOOD PRESSURE: 50 MMHG | OXYGEN SATURATION: 98 % | RESPIRATION RATE: 17 BRPM | HEART RATE: 76 BPM

## 2024-05-13 VITALS
TEMPERATURE: 99 F | WEIGHT: 104.94 LBS | DIASTOLIC BLOOD PRESSURE: 54 MMHG | SYSTOLIC BLOOD PRESSURE: 162 MMHG | RESPIRATION RATE: 18 BRPM | HEART RATE: 72 BPM | OXYGEN SATURATION: 97 % | HEIGHT: 58 IN

## 2024-05-13 DIAGNOSIS — S72.90XA UNSPECIFIED FRACTURE OF UNSPECIFIED FEMUR, INITIAL ENCOUNTER FOR CLOSED FRACTURE: Chronic | ICD-10-CM

## 2024-05-13 DIAGNOSIS — Z98.890 OTHER SPECIFIED POSTPROCEDURAL STATES: Chronic | ICD-10-CM

## 2024-05-13 DIAGNOSIS — D64.9 ANEMIA, UNSPECIFIED: ICD-10-CM

## 2024-05-13 DIAGNOSIS — K63.1 PERFORATION OF INTESTINE (NONTRAUMATIC): ICD-10-CM

## 2024-05-13 LAB
ALBUMIN SERPL ELPH-MCNC: 2.5 G/DL — LOW (ref 3.3–5)
ALP SERPL-CCNC: 57 U/L — SIGNIFICANT CHANGE UP (ref 40–120)
ALT FLD-CCNC: 30 U/L — SIGNIFICANT CHANGE UP (ref 12–78)
ANION GAP SERPL CALC-SCNC: 4 MMOL/L — LOW (ref 5–17)
APPEARANCE UR: CLEAR — SIGNIFICANT CHANGE UP
AST SERPL-CCNC: 16 U/L — SIGNIFICANT CHANGE UP (ref 15–37)
BACTERIA # UR AUTO: ABNORMAL /HPF
BASOPHILS # BLD AUTO: 0.11 K/UL — SIGNIFICANT CHANGE UP (ref 0–0.2)
BASOPHILS NFR BLD AUTO: 1 % — SIGNIFICANT CHANGE UP (ref 0–2)
BILIRUB SERPL-MCNC: 0.4 MG/DL — SIGNIFICANT CHANGE UP (ref 0.2–1.2)
BILIRUB UR-MCNC: NEGATIVE — SIGNIFICANT CHANGE UP
BLASTS # FLD: 7 % — HIGH (ref 0–0)
BUN SERPL-MCNC: 67 MG/DL — HIGH (ref 7–23)
CALCIUM SERPL-MCNC: 8.3 MG/DL — LOW (ref 8.5–10.1)
CAST: 0 /LPF — SIGNIFICANT CHANGE UP (ref 0–4)
CHLORIDE SERPL-SCNC: 101 MMOL/L — SIGNIFICANT CHANGE UP (ref 96–108)
CO2 SERPL-SCNC: 24 MMOL/L — SIGNIFICANT CHANGE UP (ref 22–31)
COLOR SPEC: YELLOW — SIGNIFICANT CHANGE UP
CREAT SERPL-MCNC: 0.98 MG/DL — SIGNIFICANT CHANGE UP (ref 0.5–1.3)
DIFF PNL FLD: ABNORMAL
EGFR: 56 ML/MIN/1.73M2 — LOW
ELLIPTOCYTES BLD QL SMEAR: SLIGHT — SIGNIFICANT CHANGE UP
EOSINOPHIL # BLD AUTO: 0.67 K/UL — HIGH (ref 0–0.5)
EOSINOPHIL NFR BLD AUTO: 6 % — SIGNIFICANT CHANGE UP (ref 0–6)
EPI CELLS # UR: PRESENT
GLUCOSE SERPL-MCNC: 186 MG/DL — HIGH (ref 70–99)
GLUCOSE UR QL: NEGATIVE MG/DL — SIGNIFICANT CHANGE UP
HCT VFR BLD CALC: 25.4 % — LOW (ref 34.5–45)
HGB BLD-MCNC: 8.5 G/DL — LOW (ref 11.5–15.5)
HYPOCHROMIA BLD QL: SIGNIFICANT CHANGE UP
KETONES UR-MCNC: NEGATIVE MG/DL — SIGNIFICANT CHANGE UP
LACTATE SERPL-SCNC: 1.2 MMOL/L — SIGNIFICANT CHANGE UP (ref 0.7–2)
LEUKOCYTE ESTERASE UR-ACNC: ABNORMAL
LIDOCAIN IGE QN: 14 U/L — SIGNIFICANT CHANGE UP (ref 13–75)
LYMPHOCYTES # BLD AUTO: 0.78 K/UL — LOW (ref 1–3.3)
LYMPHOCYTES # BLD AUTO: 7 % — LOW (ref 13–44)
MANUAL SMEAR VERIFICATION: SIGNIFICANT CHANGE UP
MCHC RBC-ENTMCNC: 28.5 PG — SIGNIFICANT CHANGE UP (ref 27–34)
MCHC RBC-ENTMCNC: 33.5 GM/DL — SIGNIFICANT CHANGE UP (ref 32–36)
MCV RBC AUTO: 85.2 FL — SIGNIFICANT CHANGE UP (ref 80–100)
MONOCYTES # BLD AUTO: 2.13 K/UL — HIGH (ref 0–0.9)
MONOCYTES NFR BLD AUTO: 19 % — HIGH (ref 2–14)
NEUTROPHILS # BLD AUTO: 6.05 K/UL — SIGNIFICANT CHANGE UP (ref 1.8–7.4)
NEUTROPHILS NFR BLD AUTO: 54 % — SIGNIFICANT CHANGE UP (ref 43–77)
NITRITE UR-MCNC: NEGATIVE — SIGNIFICANT CHANGE UP
NRBC # BLD: 0 /100 WBCS — SIGNIFICANT CHANGE UP (ref 0–0)
NRBC # BLD: SIGNIFICANT CHANGE UP /100 WBCS (ref 0–0)
PH UR: 7.5 — SIGNIFICANT CHANGE UP (ref 5–8)
PLAT MORPH BLD: NORMAL — SIGNIFICANT CHANGE UP
PLATELET # BLD AUTO: 152 K/UL — SIGNIFICANT CHANGE UP (ref 150–400)
POIKILOCYTOSIS BLD QL AUTO: SLIGHT — SIGNIFICANT CHANGE UP
POTASSIUM SERPL-MCNC: 4.9 MMOL/L — SIGNIFICANT CHANGE UP (ref 3.5–5.3)
POTASSIUM SERPL-SCNC: 4.9 MMOL/L — SIGNIFICANT CHANGE UP (ref 3.5–5.3)
PROT SERPL-MCNC: 6.2 GM/DL — SIGNIFICANT CHANGE UP (ref 6–8.3)
PROT UR-MCNC: NEGATIVE MG/DL — SIGNIFICANT CHANGE UP
RBC # BLD: 2.98 M/UL — LOW (ref 3.8–5.2)
RBC # FLD: 14.4 % — SIGNIFICANT CHANGE UP (ref 10.3–14.5)
RBC BLD AUTO: ABNORMAL
RBC CASTS # UR COMP ASSIST: 43 /HPF — HIGH (ref 0–4)
SODIUM SERPL-SCNC: 129 MMOL/L — LOW (ref 135–145)
SP GR SPEC: 1.02 — SIGNIFICANT CHANGE UP (ref 1–1.03)
SQUAMOUS # UR AUTO: 2 /HPF — SIGNIFICANT CHANGE UP (ref 0–5)
TARGETS BLD QL SMEAR: SIGNIFICANT CHANGE UP
UROBILINOGEN FLD QL: 1 MG/DL — SIGNIFICANT CHANGE UP (ref 0.2–1)
VARIANT LYMPHS # BLD: 6 % — SIGNIFICANT CHANGE UP (ref 0–6)
WBC # BLD: 11.2 K/UL — HIGH (ref 3.8–10.5)
WBC # FLD AUTO: 11.2 K/UL — HIGH (ref 3.8–10.5)
WBC UR QL: 15 /HPF — HIGH (ref 0–5)

## 2024-05-13 PROCEDURE — 86922 COMPATIBILITY TEST ANTIGLOB: CPT

## 2024-05-13 PROCEDURE — C9113: CPT

## 2024-05-13 PROCEDURE — 36430 TRANSFUSION BLD/BLD COMPNT: CPT

## 2024-05-13 PROCEDURE — 86905 BLOOD TYPING RBC ANTIGENS: CPT

## 2024-05-13 PROCEDURE — 83036 HEMOGLOBIN GLYCOSYLATED A1C: CPT

## 2024-05-13 PROCEDURE — 83540 ASSAY OF IRON: CPT

## 2024-05-13 PROCEDURE — 94640 AIRWAY INHALATION TREATMENT: CPT

## 2024-05-13 PROCEDURE — 86850 RBC ANTIBODY SCREEN: CPT

## 2024-05-13 PROCEDURE — 85027 COMPLETE CBC AUTOMATED: CPT

## 2024-05-13 PROCEDURE — 85025 COMPLETE CBC W/AUTO DIFF WBC: CPT

## 2024-05-13 PROCEDURE — 74240 X-RAY XM UPR GI TRC 1CNTRST: CPT

## 2024-05-13 PROCEDURE — 80048 BASIC METABOLIC PNL TOTAL CA: CPT

## 2024-05-13 PROCEDURE — 82607 VITAMIN B-12: CPT

## 2024-05-13 PROCEDURE — 83735 ASSAY OF MAGNESIUM: CPT

## 2024-05-13 PROCEDURE — 94618 PULMONARY STRESS TESTING: CPT

## 2024-05-13 PROCEDURE — 86900 BLOOD TYPING SEROLOGIC ABO: CPT

## 2024-05-13 PROCEDURE — 76705 ECHO EXAM OF ABDOMEN: CPT | Mod: 26

## 2024-05-13 PROCEDURE — 97163 PT EVAL HIGH COMPLEX 45 MIN: CPT | Mod: GP

## 2024-05-13 PROCEDURE — 80053 COMPREHEN METABOLIC PANEL: CPT

## 2024-05-13 PROCEDURE — 71045 X-RAY EXAM CHEST 1 VIEW: CPT | Mod: 26

## 2024-05-13 PROCEDURE — P9016: CPT

## 2024-05-13 PROCEDURE — 84100 ASSAY OF PHOSPHORUS: CPT

## 2024-05-13 PROCEDURE — 93005 ELECTROCARDIOGRAM TRACING: CPT

## 2024-05-13 PROCEDURE — 71045 X-RAY EXAM CHEST 1 VIEW: CPT

## 2024-05-13 PROCEDURE — 97116 GAIT TRAINING THERAPY: CPT | Mod: GP

## 2024-05-13 PROCEDURE — 83880 ASSAY OF NATRIURETIC PEPTIDE: CPT

## 2024-05-13 PROCEDURE — 86921 COMPATIBILITY TEST INCUBATE: CPT

## 2024-05-13 PROCEDURE — 99285 EMERGENCY DEPT VISIT HI MDM: CPT

## 2024-05-13 PROCEDURE — 82728 ASSAY OF FERRITIN: CPT

## 2024-05-13 PROCEDURE — 84484 ASSAY OF TROPONIN QUANT: CPT

## 2024-05-13 PROCEDURE — 82746 ASSAY OF FOLIC ACID SERUM: CPT

## 2024-05-13 PROCEDURE — 36415 COLL VENOUS BLD VENIPUNCTURE: CPT

## 2024-05-13 PROCEDURE — 86920 COMPATIBILITY TEST SPIN: CPT

## 2024-05-13 PROCEDURE — 83550 IRON BINDING TEST: CPT

## 2024-05-13 PROCEDURE — 74174 CTA ABD&PLVS W/CONTRAST: CPT | Mod: 26,MC

## 2024-05-13 PROCEDURE — 97530 THERAPEUTIC ACTIVITIES: CPT | Mod: GP

## 2024-05-13 PROCEDURE — 82962 GLUCOSE BLOOD TEST: CPT

## 2024-05-13 PROCEDURE — 86901 BLOOD TYPING SEROLOGIC RH(D): CPT

## 2024-05-13 RX ORDER — SODIUM CHLORIDE 9 MG/ML
1000 INJECTION INTRAMUSCULAR; INTRAVENOUS; SUBCUTANEOUS
Refills: 0 | Status: DISCONTINUED | OUTPATIENT
Start: 2024-05-13 | End: 2024-05-14

## 2024-05-13 RX ORDER — MORPHINE SULFATE 50 MG/1
2 CAPSULE, EXTENDED RELEASE ORAL EVERY 4 HOURS
Refills: 0 | Status: DISCONTINUED | OUTPATIENT
Start: 2024-05-13 | End: 2024-05-16

## 2024-05-13 RX ORDER — ONDANSETRON 8 MG/1
4 TABLET, FILM COATED ORAL ONCE
Refills: 0 | Status: COMPLETED | OUTPATIENT
Start: 2024-05-13 | End: 2024-05-13

## 2024-05-13 RX ORDER — SODIUM CHLORIDE 9 MG/ML
1000 INJECTION, SOLUTION INTRAVENOUS
Refills: 0 | Status: DISCONTINUED | OUTPATIENT
Start: 2024-05-13 | End: 2024-05-13

## 2024-05-13 RX ORDER — LOSARTAN POTASSIUM 100 MG/1
100 TABLET, FILM COATED ORAL DAILY
Refills: 0 | Status: DISCONTINUED | OUTPATIENT
Start: 2024-05-13 | End: 2024-05-16

## 2024-05-13 RX ORDER — SODIUM CHLORIDE 9 MG/ML
1000 INJECTION INTRAMUSCULAR; INTRAVENOUS; SUBCUTANEOUS ONCE
Refills: 0 | Status: COMPLETED | OUTPATIENT
Start: 2024-05-13 | End: 2024-05-13

## 2024-05-13 RX ORDER — DIPHENHYDRAMINE HCL 50 MG
25 CAPSULE ORAL ONCE
Refills: 0 | Status: COMPLETED | OUTPATIENT
Start: 2024-05-13 | End: 2024-05-13

## 2024-05-13 RX ORDER — CHOLECALCIFEROL (VITAMIN D3) 125 MCG
1000 CAPSULE ORAL DAILY
Refills: 0 | Status: DISCONTINUED | OUTPATIENT
Start: 2024-05-13 | End: 2024-05-16

## 2024-05-13 RX ORDER — PIPERACILLIN AND TAZOBACTAM 4; .5 G/20ML; G/20ML
3.38 INJECTION, POWDER, LYOPHILIZED, FOR SOLUTION INTRAVENOUS ONCE
Refills: 0 | Status: COMPLETED | OUTPATIENT
Start: 2024-05-13 | End: 2024-05-13

## 2024-05-13 RX ORDER — ACETAMINOPHEN 500 MG
725 TABLET ORAL EVERY 6 HOURS
Refills: 0 | Status: DISCONTINUED | OUTPATIENT
Start: 2024-05-13 | End: 2024-05-16

## 2024-05-13 RX ORDER — ESCITALOPRAM OXALATE 10 MG/1
10 TABLET, FILM COATED ORAL DAILY
Refills: 0 | Status: DISCONTINUED | OUTPATIENT
Start: 2024-05-13 | End: 2024-05-16

## 2024-05-13 RX ORDER — KETOROLAC TROMETHAMINE 30 MG/ML
15 SYRINGE (ML) INJECTION EVERY 6 HOURS
Refills: 0 | Status: DISCONTINUED | OUTPATIENT
Start: 2024-05-13 | End: 2024-05-14

## 2024-05-13 RX ORDER — ASCORBIC ACID 60 MG
500 TABLET,CHEWABLE ORAL DAILY
Refills: 0 | Status: DISCONTINUED | OUTPATIENT
Start: 2024-05-13 | End: 2024-05-16

## 2024-05-13 RX ORDER — METRONIDAZOLE 500 MG
500 TABLET ORAL ONCE
Refills: 0 | Status: COMPLETED | OUTPATIENT
Start: 2024-05-13 | End: 2024-05-13

## 2024-05-13 RX ORDER — ASPIRIN/CALCIUM CARB/MAGNESIUM 324 MG
81 TABLET ORAL DAILY
Refills: 0 | Status: DISCONTINUED | OUTPATIENT
Start: 2024-05-13 | End: 2024-05-16

## 2024-05-13 RX ORDER — CALCIUM CARBONATE 500(1250)
1 TABLET ORAL DAILY
Refills: 0 | Status: DISCONTINUED | OUTPATIENT
Start: 2024-05-13 | End: 2024-05-16

## 2024-05-13 RX ORDER — ACETAMINOPHEN 500 MG
650 TABLET ORAL ONCE
Refills: 0 | Status: COMPLETED | OUTPATIENT
Start: 2024-05-13 | End: 2024-05-13

## 2024-05-13 RX ORDER — LEVOTHYROXINE SODIUM 125 MCG
50 TABLET ORAL DAILY
Refills: 0 | Status: DISCONTINUED | OUTPATIENT
Start: 2024-05-13 | End: 2024-05-16

## 2024-05-13 RX ORDER — PANTOPRAZOLE SODIUM 20 MG/1
40 TABLET, DELAYED RELEASE ORAL DAILY
Refills: 0 | Status: DISCONTINUED | OUTPATIENT
Start: 2024-05-13 | End: 2024-05-14

## 2024-05-13 RX ORDER — PIPERACILLIN AND TAZOBACTAM 4; .5 G/20ML; G/20ML
3.38 INJECTION, POWDER, LYOPHILIZED, FOR SOLUTION INTRAVENOUS ONCE
Refills: 0 | Status: COMPLETED | OUTPATIENT
Start: 2024-05-13 | End: 2024-05-14

## 2024-05-13 RX ORDER — ENOXAPARIN SODIUM 100 MG/ML
40 INJECTION SUBCUTANEOUS EVERY 24 HOURS
Refills: 0 | Status: DISCONTINUED | OUTPATIENT
Start: 2024-05-13 | End: 2024-05-15

## 2024-05-13 RX ORDER — FENOFIBRATE,MICRONIZED 130 MG
145 CAPSULE ORAL DAILY
Refills: 0 | Status: DISCONTINUED | OUTPATIENT
Start: 2024-05-13 | End: 2024-05-16

## 2024-05-13 RX ORDER — ONDANSETRON 8 MG/1
4 TABLET, FILM COATED ORAL EVERY 6 HOURS
Refills: 0 | Status: DISCONTINUED | OUTPATIENT
Start: 2024-05-13 | End: 2024-05-16

## 2024-05-13 RX ORDER — CEFTRIAXONE 500 MG/1
1000 INJECTION, POWDER, FOR SOLUTION INTRAMUSCULAR; INTRAVENOUS ONCE
Refills: 0 | Status: COMPLETED | OUTPATIENT
Start: 2024-05-13 | End: 2024-05-13

## 2024-05-13 RX ORDER — MORPHINE SULFATE 50 MG/1
2 CAPSULE, EXTENDED RELEASE ORAL ONCE
Refills: 0 | Status: DISCONTINUED | OUTPATIENT
Start: 2024-05-13 | End: 2024-05-13

## 2024-05-13 RX ORDER — ATORVASTATIN CALCIUM 80 MG/1
80 TABLET, FILM COATED ORAL AT BEDTIME
Refills: 0 | Status: DISCONTINUED | OUTPATIENT
Start: 2024-05-13 | End: 2024-05-16

## 2024-05-13 RX ADMIN — ATORVASTATIN CALCIUM 80 MILLIGRAM(S): 80 TABLET, FILM COATED ORAL at 22:04

## 2024-05-13 RX ADMIN — ONDANSETRON 4 MILLIGRAM(S): 8 TABLET, FILM COATED ORAL at 14:45

## 2024-05-13 RX ADMIN — CEFTRIAXONE 1000 MILLIGRAM(S): 500 INJECTION, POWDER, FOR SOLUTION INTRAMUSCULAR; INTRAVENOUS at 16:16

## 2024-05-13 RX ADMIN — LOSARTAN POTASSIUM 100 MILLIGRAM(S): 100 TABLET, FILM COATED ORAL at 20:01

## 2024-05-13 RX ADMIN — Medication 650 MILLIGRAM(S): at 08:03

## 2024-05-13 RX ADMIN — SODIUM CHLORIDE 1000 MILLILITER(S): 9 INJECTION INTRAMUSCULAR; INTRAVENOUS; SUBCUTANEOUS at 14:44

## 2024-05-13 RX ADMIN — MORPHINE SULFATE 2 MILLIGRAM(S): 50 CAPSULE, EXTENDED RELEASE ORAL at 14:30

## 2024-05-13 RX ADMIN — SODIUM CHLORIDE 100 MILLILITER(S): 9 INJECTION INTRAMUSCULAR; INTRAVENOUS; SUBCUTANEOUS at 22:08

## 2024-05-13 RX ADMIN — Medication 650 MILLIGRAM(S): at 12:56

## 2024-05-13 RX ADMIN — MORPHINE SULFATE 2 MILLIGRAM(S): 50 CAPSULE, EXTENDED RELEASE ORAL at 14:44

## 2024-05-13 RX ADMIN — Medication 100 MILLIGRAM(S): at 17:24

## 2024-05-13 RX ADMIN — Medication 15 MILLIGRAM(S): at 22:16

## 2024-05-13 RX ADMIN — Medication 650 MILLIGRAM(S): at 13:35

## 2024-05-13 RX ADMIN — Medication 25 MILLIGRAM(S): at 08:09

## 2024-05-13 RX ADMIN — Medication 81 MILLIGRAM(S): at 20:00

## 2024-05-13 RX ADMIN — Medication 650 MILLIGRAM(S): at 08:13

## 2024-05-13 RX ADMIN — Medication 500 MILLIGRAM(S): at 20:00

## 2024-05-13 RX ADMIN — PIPERACILLIN AND TAZOBACTAM 200 GRAM(S): 4; .5 INJECTION, POWDER, LYOPHILIZED, FOR SOLUTION INTRAVENOUS at 20:00

## 2024-05-13 NOTE — ED ADULT NURSE NOTE - OBJECTIVE STATEMENT
Ambulance to ER with c/o epigastric pain, nausea, vomiting and R flank pain x 3 weeks, Patient a & ox4, respirations even and unlabored on room air. Await MD posey.

## 2024-05-13 NOTE — H&P ADULT - NSHPLABSRESULTS_GEN_ALL_CORE
Vital Signs Last 24 Hrs  T(C): 36.7 (13 May 2024 16:25), Max: 37.4 (13 May 2024 11:09)  T(F): 98 (13 May 2024 16:25), Max: 99.3 (13 May 2024 11:09)  HR: 72 (13 May 2024 13:58) (72 - 88)  BP: 119/50 (13 May 2024 16:25) (118/31 - 162/54)  BP(mean): --  RR: 16 (13 May 2024 16:25) (16 - 19)  SpO2: 96% (13 May 2024 16:25) (94% - 98%)    Parameters below as of 13 May 2024 16:25  Patient On (Oxygen Delivery Method): nasal cannula  O2 Flow (L/min): 3                          8.5    11.20 )-----------( 152      ( 13 May 2024 14:13 )             25.4     05-13    129<L>  |  101  |  67<H>  ----------------------------<  186<H>  4.9   |  24  |  0.98    Ca    8.3<L>      13 May 2024 14:13    TPro  6.2  /  Alb  2.5<L>  /  TBili  0.4  /  DBili  x   /  AST  16  /  ALT  30  /  AlkPhos  57  05-13    I&O's Summary    < from: US Abdomen Upper Quadrant Right (05.13.24 @ 15:52) >    IMPRESSION:  No acute finding.    < end of copied text >    < from: CT Angio Abdomen and Pelvis w/ IV Cont (05.13.24 @ 15:36) >    IMPRESSION:  Contained perforation of the first portion of the duodenum.  No pneumoperitoneum, ascites or drainable fluid collection.  Severe superior mesenteric artery stenosis.    < end of copied text >

## 2024-05-13 NOTE — ED ADULT TRIAGE NOTE - CHIEF COMPLAINT QUOTE
Pt presented to the ER from outpatient infusion center with c/o right flank pain. Pt reported that the pain has been on and off for the past few weeks. Pt reported that she is unable to tolerate PO intake at this time due to nausea and vomiting. Pt stated that the pain will radiate from her right flank to epigastric area. Pt reported that she had a fall on 5/5. Pt has a hx of MDS and is transfusion depend, pt received two unit of PRBCs today with no complications. Pt received Tylenol 1 hour PTA by infusion nurse. Pt reported that it will help for a short time.

## 2024-05-13 NOTE — PATIENT PROFILE ADULT - FALL HARM RISK - HARM RISK INTERVENTIONS

## 2024-05-13 NOTE — ED PROVIDER NOTE - CLINICAL SUMMARY MEDICAL DECISION MAKING FREE TEXT BOX
presents to the ER complaining of right upper quadrant pain that has been intermittent over the past 2 weeks.  Patient states that the pain is worsening at this time.  As per neighbor at bedside patient was doubled over in pain earlier today.  Patient is tender diffusely. Labs, ct, ruq us ordered. Pain control, antiemetic given. presents to the ER complaining of right upper quadrant pain that has been intermittent over the past 2 weeks.  Patient states that the pain is worsening at this time.  As per neighbor at bedside patient was doubled over in pain earlier today.  Patient is tender diffusely. Labs, ct, ruq us ordered. Pain control, antiemetic given. Found to have contained duodenal perforation. Hyponatremia, UTI. Given Abx. Surgery consulted. Patient admitted to surgery in stable condition.

## 2024-05-13 NOTE — H&P ADULT - ASSESSMENT
87yo F with h/o myelodysplastic syndrome presents with contained perforation of the 1st segment of the duodenum. She notes pain is much improved with pain medication and she would like to avoid surgical intervention.     Plan:  - admit to the surgery service under Dr Brown  - NPO  - IVF  - IV abx  - serial abd exams  - monitor VS  - encourage OOB/IS use  - pain and nausea control PRN  - DVT and GI ppx    Discussed with Dr. Brown

## 2024-05-13 NOTE — H&P ADULT - NSHPPHYSICALEXAM_GEN_ALL_CORE
Physical Exam:  Pt is AAOx3  General: No acute distress.   Chest: Non-labored respirations, symmetric chest rise  Heart: RRR  Abdomen: Soft, nondistended, tender to palpation of the RUQ and R flank  Back: Normal curvature, no tenderness.   Neuro: Physiological, no localizing findings.   Skin: Normal, no rashes, no lesions noted.   Extremities: Warm, well perfused, no edema.

## 2024-05-13 NOTE — ED PROVIDER NOTE - OBJECTIVE STATEMENT
88-year-old female with history of myelodysplastic syndrome, hypertension, hyperlipidemia presents to the ER complaining of abdominal pain.  Patient states that she has had intermittent pain in the right upper quadrant for the past 2 weeks.  Patient states that the pain will radiate around her side to the right flank.  More recently the pain has become worse.  No exacerbating or alleviating factors.  Associated with nausea, vomiting.  Denies fever, chills, diarrhea.  Patient often receives blood transfusion due to her myelodysplastic syndrome history.  Most recent transfusion was today where she received 2 units prbc.

## 2024-05-13 NOTE — ED PROVIDER NOTE - PHYSICAL EXAMINATION
Const: Well appearing, NAD  Eyes: PERRL, EOM intact  CV: RRR, no murmurs, no chest wall tenderness, distal pulses intact  Resp: CTAB, normal resp effort  GI: Diffuse abdomianl tenderness. Worse in RUQ. soft, nondistended.  MSK: Full ROM, no muscle or bony deformity or tenderness  Neuro: AOx3, GCS 15, No focal deficits  Skin: No rash, laceration or abrasion  Psych: calm, cooperative.

## 2024-05-13 NOTE — ED ADULT NURSE REASSESSMENT NOTE - NS ED NURSE REASSESS COMMENT FT1
Assumed care at 1900, pt resting in stretcher awake and alert, plan of care discussed, pt has no concerns at this time, comfort and safety maintained

## 2024-05-13 NOTE — H&P ADULT - HISTORY OF PRESENT ILLNESS
89yo F with h/o myelodysplastic syndrome, HTN, HLD, hypothyroidism, PML, cataracts, and macular degeneration presents to the ED with 2 weeks of worsening abd pain. The patient describes pain to the RUQ that has been increasing in intensity, associated with nausea, vomiting, and low appetite. Patient was seen for scheduled transfusion today and referred to ED. She denies hematemesis, dark stools, change in bowel habits, fevers, or chills. She notes h/o "heartburn" which she treats with OTC medication. No prior endoscopy.

## 2024-05-14 LAB
ANION GAP SERPL CALC-SCNC: 5 MMOL/L — SIGNIFICANT CHANGE UP (ref 5–17)
BUN SERPL-MCNC: 46 MG/DL — HIGH (ref 7–23)
CALCIUM SERPL-MCNC: 7.5 MG/DL — LOW (ref 8.5–10.1)
CHLORIDE SERPL-SCNC: 111 MMOL/L — HIGH (ref 96–108)
CO2 SERPL-SCNC: 23 MMOL/L — SIGNIFICANT CHANGE UP (ref 22–31)
CREAT SERPL-MCNC: 0.91 MG/DL — SIGNIFICANT CHANGE UP (ref 0.5–1.3)
EGFR: 61 ML/MIN/1.73M2 — SIGNIFICANT CHANGE UP
GLUCOSE SERPL-MCNC: 119 MG/DL — HIGH (ref 70–99)
HCT VFR BLD CALC: 22.2 % — LOW (ref 34.5–45)
HGB BLD-MCNC: 7.5 G/DL — LOW (ref 11.5–15.5)
MAGNESIUM SERPL-MCNC: 1.8 MG/DL — SIGNIFICANT CHANGE UP (ref 1.6–2.6)
MCHC RBC-ENTMCNC: 29 PG — SIGNIFICANT CHANGE UP (ref 27–34)
MCHC RBC-ENTMCNC: 33.8 GM/DL — SIGNIFICANT CHANGE UP (ref 32–36)
MCV RBC AUTO: 85.7 FL — SIGNIFICANT CHANGE UP (ref 80–100)
PHOSPHATE SERPL-MCNC: 2.2 MG/DL — LOW (ref 2.5–4.5)
PLATELET # BLD AUTO: 115 K/UL — LOW (ref 150–400)
POTASSIUM SERPL-MCNC: 4 MMOL/L — SIGNIFICANT CHANGE UP (ref 3.5–5.3)
POTASSIUM SERPL-SCNC: 4 MMOL/L — SIGNIFICANT CHANGE UP (ref 3.5–5.3)
RBC # BLD: 2.59 M/UL — LOW (ref 3.8–5.2)
RBC # FLD: 14.7 % — HIGH (ref 10.3–14.5)
SODIUM SERPL-SCNC: 139 MMOL/L — SIGNIFICANT CHANGE UP (ref 135–145)
WBC # BLD: 7.76 K/UL — SIGNIFICANT CHANGE UP (ref 3.8–10.5)
WBC # FLD AUTO: 7.76 K/UL — SIGNIFICANT CHANGE UP (ref 3.8–10.5)

## 2024-05-14 PROCEDURE — 99222 1ST HOSP IP/OBS MODERATE 55: CPT

## 2024-05-14 RX ORDER — PANTOPRAZOLE SODIUM 20 MG/1
40 TABLET, DELAYED RELEASE ORAL
Refills: 0 | Status: DISCONTINUED | OUTPATIENT
Start: 2024-05-14 | End: 2024-05-16

## 2024-05-14 RX ORDER — POTASSIUM PHOSPHATE, MONOBASIC POTASSIUM PHOSPHATE, DIBASIC 236; 224 MG/ML; MG/ML
30 INJECTION, SOLUTION INTRAVENOUS ONCE
Refills: 0 | Status: COMPLETED | OUTPATIENT
Start: 2024-05-14 | End: 2024-05-14

## 2024-05-14 RX ORDER — SENNA PLUS 8.6 MG/1
1 TABLET ORAL DAILY
Refills: 0 | Status: DISCONTINUED | OUTPATIENT
Start: 2024-05-14 | End: 2024-05-16

## 2024-05-14 RX ORDER — PIPERACILLIN AND TAZOBACTAM 4; .5 G/20ML; G/20ML
3.38 INJECTION, POWDER, LYOPHILIZED, FOR SOLUTION INTRAVENOUS EVERY 8 HOURS
Refills: 0 | Status: DISCONTINUED | OUTPATIENT
Start: 2024-05-14 | End: 2024-05-16

## 2024-05-14 RX ORDER — DEXTROSE MONOHYDRATE, SODIUM CHLORIDE, AND POTASSIUM CHLORIDE 50; .745; 4.5 G/1000ML; G/1000ML; G/1000ML
1000 INJECTION, SOLUTION INTRAVENOUS
Refills: 0 | Status: DISCONTINUED | OUTPATIENT
Start: 2024-05-14 | End: 2024-05-15

## 2024-05-14 RX ADMIN — Medication 81 MILLIGRAM(S): at 10:30

## 2024-05-14 RX ADMIN — Medication 1000 UNIT(S): at 10:30

## 2024-05-14 RX ADMIN — PANTOPRAZOLE SODIUM 40 MILLIGRAM(S): 20 TABLET, DELAYED RELEASE ORAL at 10:31

## 2024-05-14 RX ADMIN — DEXTROSE MONOHYDRATE, SODIUM CHLORIDE, AND POTASSIUM CHLORIDE 85 MILLILITER(S): 50; .745; 4.5 INJECTION, SOLUTION INTRAVENOUS at 16:44

## 2024-05-14 RX ADMIN — ATORVASTATIN CALCIUM 80 MILLIGRAM(S): 80 TABLET, FILM COATED ORAL at 21:23

## 2024-05-14 RX ADMIN — PANTOPRAZOLE SODIUM 40 MILLIGRAM(S): 20 TABLET, DELAYED RELEASE ORAL at 21:23

## 2024-05-14 RX ADMIN — Medication 1 TABLET(S): at 10:35

## 2024-05-14 RX ADMIN — LOSARTAN POTASSIUM 100 MILLIGRAM(S): 100 TABLET, FILM COATED ORAL at 10:30

## 2024-05-14 RX ADMIN — ENOXAPARIN SODIUM 40 MILLIGRAM(S): 100 INJECTION SUBCUTANEOUS at 05:21

## 2024-05-14 RX ADMIN — PIPERACILLIN AND TAZOBACTAM 25 GRAM(S): 4; .5 INJECTION, POWDER, LYOPHILIZED, FOR SOLUTION INTRAVENOUS at 00:06

## 2024-05-14 RX ADMIN — ESCITALOPRAM OXALATE 10 MILLIGRAM(S): 10 TABLET, FILM COATED ORAL at 10:30

## 2024-05-14 RX ADMIN — POTASSIUM PHOSPHATE, MONOBASIC POTASSIUM PHOSPHATE, DIBASIC 83.33 MILLIMOLE(S): 236; 224 INJECTION, SOLUTION INTRAVENOUS at 16:45

## 2024-05-14 RX ADMIN — PIPERACILLIN AND TAZOBACTAM 25 GRAM(S): 4; .5 INJECTION, POWDER, LYOPHILIZED, FOR SOLUTION INTRAVENOUS at 16:46

## 2024-05-14 RX ADMIN — SODIUM CHLORIDE 100 MILLILITER(S): 9 INJECTION INTRAMUSCULAR; INTRAVENOUS; SUBCUTANEOUS at 07:00

## 2024-05-14 RX ADMIN — Medication 145 MILLIGRAM(S): at 10:31

## 2024-05-14 RX ADMIN — Medication 500 MILLIGRAM(S): at 10:30

## 2024-05-14 NOTE — PHYSICAL THERAPY INITIAL EVALUATION ADULT - LEVEL OF INDEPENDENCE: STAIR NEGOTIATION, REHAB EVAL
NOT ATTEMPTED - NOT A BARRIER TO DISCHARGE AS PATIENT HAS STAIR LIFT TO ENTER HER HOME & RESIDES ON MAIN LEVEL OF THE HOME

## 2024-05-14 NOTE — PHYSICAL THERAPY INITIAL EVALUATION ADULT - IMPAIRMENTS FOUND, PT EVAL
ergonomics and body mechanics/gait, locomotion, and balance/muscle strength/poor safety awareness/posture/sensory integrity

## 2024-05-14 NOTE — CONSULT NOTE ADULT - SUBJECTIVE AND OBJECTIVE BOX
PCP:  Dr. Romaine Silva    CHIEF COMPLAINT:   abd pain    HISTORY OF THE PRESENT ILLNESS:  89yo F with h/o myelodysplastic syndrome, HTN, HLD, hypothyroidism, PML, cataracts, and macular degeneration presents to the ED with 2 weeks of worsening abd pain. The patient describes pain to the RUQ that has been increasing in intensity, associated with nausea, vomiting, and low appetite. Patient was seen for scheduled transfusion today and referred to ED. She denies hematemesis, dark stools, change in bowel habits, fevers, or chills. She notes h/o "heartburn" which she treats with OTC medication. No prior endoscopy.     Upon eval in the ER, patient had CT abd pelvis which revealed a perforated but contained perforation in the 1st portion of the duodenum.  Also noted was severe superior mesenteric artery stenosis.  Patient admitted by surgery.  NPO.  IVF.  pain control.        PAST MEDICAL HISTORY:  as above    PAST SURGICAL HISTORY:  as above    FAMILY HISTORY:   non-contributory to the patient's current presentation    SOCIAL HISTORY:  no smoking, no alcohol, no drugs    REVIEW OF SYSTEMS:   All 10 systems reviewed in detailed and found to be negative with the exception of what has already been described above    MEDICATIONS  (STANDING):  ascorbic acid 500 milliGRAM(s) Oral daily  aspirin enteric coated 81 milliGRAM(s) Oral daily  atorvastatin 80 milliGRAM(s) Oral at bedtime  calcium carbonate    500 mG (Tums) Chewable 1 Tablet(s) Chew daily  cholecalciferol 1000 Unit(s) Oral daily  dextrose 5% + sodium chloride 0.45% with potassium chloride 20 mEq/L 1000 milliLiter(s) (85 mL/Hr) IV Continuous <Continuous>  enoxaparin Injectable 40 milliGRAM(s) SubCutaneous every 24 hours  escitalopram 10 milliGRAM(s) Oral daily  fenofibrate Tablet 145 milliGRAM(s) Oral daily  levothyroxine 50 MICROGram(s) Oral daily  losartan 100 milliGRAM(s) Oral daily  pantoprazole  Injectable 40 milliGRAM(s) IV Push two times a day  piperacillin/tazobactam IVPB.. 3.375 Gram(s) IV Intermittent every 8 hours  senna 1 Tablet(s) Oral daily    MEDICATIONS  (PRN):  acetaminophen   IVPB .. 725 milliGRAM(s) IV Intermittent every 6 hours PRN Mild Pain (1 - 3)  artificial  tears Solution 1 Drop(s) Both EYES daily PRN Dry Eyes  morphine  - Injectable 2 milliGRAM(s) IV Push every 4 hours PRN Severe Pain (7 - 10)  ondansetron Injectable 4 milliGRAM(s) IV Push every 6 hours PRN Nausea and/or Vomiting      HEENT:  pupils equal and reactive, EOMI, no oropharyngeal lesions, erythema, exudates, oral thrush  NECK:   supple, no carotid bruits  CV:  +S1, +S2, regular, no murmurs  RESP:   lungs clear to auscultation bilaterally, no wheezing, rales, rhonchi, good air entry bilaterally  GI:  abdomen soft, non-tender, non-distended, normal BS  EXT:  no clubbing, no cyanosis, no edema, no calf pain, swelling or erythema  NEURO:  AAOX3, no focal neurological deficits, follows all commands, able to move extremities spontaneously  SKIN:  no ulcers, lesions or rashes    LABS:                        7.5    7.76  )-----------( 115      ( 14 May 2024 06:55 )             22.2     05-14    139  |  111<H>  |  46<H>  ----------------------------<  119<H>  4.0   |  23  |  0.91    Ca    7.5<L>      14 May 2024 06:55  Phos  2.2     05-14  Mg     1.8     05-14    TPro  6.2  /  Alb  2.5<L>  /  TBili  0.4  /  DBili  x   /  AST  16  /  ALT  30  /  AlkPhos  57  05-13    LIVER FUNCTIONS - ( 13 May 2024 14:13 )  Alb: 2.5 g/dL / Pro: 6.2 gm/dL / ALK PHOS: 57 U/L / ALT: 30 U/L / AST: 16 U/L / GGT: x           Urinalysis Basic - ( 14 May 2024 06:55 )  Color: x / Appearance: x / SG: x / pH: x  Gluc: 119 mg/dL / Ketone: x  / Bili: x / Urobili: x   Blood: x / Protein: x / Nitrite: x   Leuk Esterase: x / RBC: x / WBC x   Sq Epi: x / Non Sq Epi: x / Bacteria: x   PCP:  Dr. Romaine Silva    CHIEF COMPLAINT:   abd pain    HISTORY OF THE PRESENT ILLNESS:  89yo F with h/o myelodysplastic syndrome, HTN, HLD, hypothyroidism, PML, cataracts, and macular degeneration presents to the ED with 2 weeks of worsening abd pain. The patient describes pain to the RUQ that has been increasing in intensity, associated with nausea, vomiting, and low appetite. Patient was seen for scheduled transfusion today and referred to ED. She denies hematemesis, dark stools, change in bowel habits, fevers, or chills. She notes h/o "heartburn" which she treats with OTC medication. No prior endoscopy.     Upon eval in the ER, patient had CT abd pelvis which revealed a perforated but contained perforation in the 1st portion of the duodenum.  Also noted was severe superior mesenteric artery stenosis.  Patient admitted by surgery.  NPO.  IVF.  pain control.      PT seen.  Feeling better since admission.  Less abd pain.        PAST MEDICAL HISTORY:  as above    PAST SURGICAL HISTORY:  as above    FAMILY HISTORY:   non-contributory to the patient's current presentation    SOCIAL HISTORY:  no smoking, no alcohol, no drugs    REVIEW OF SYSTEMS:   All 10 systems reviewed in detailed and found to be negative with the exception of what has already been described above    MEDICATIONS  (STANDING):  ascorbic acid 500 milliGRAM(s) Oral daily  aspirin enteric coated 81 milliGRAM(s) Oral daily  atorvastatin 80 milliGRAM(s) Oral at bedtime  calcium carbonate    500 mG (Tums) Chewable 1 Tablet(s) Chew daily  cholecalciferol 1000 Unit(s) Oral daily  dextrose 5% + sodium chloride 0.45% with potassium chloride 20 mEq/L 1000 milliLiter(s) (85 mL/Hr) IV Continuous <Continuous>  enoxaparin Injectable 40 milliGRAM(s) SubCutaneous every 24 hours  escitalopram 10 milliGRAM(s) Oral daily  fenofibrate Tablet 145 milliGRAM(s) Oral daily  levothyroxine 50 MICROGram(s) Oral daily  losartan 100 milliGRAM(s) Oral daily  pantoprazole  Injectable 40 milliGRAM(s) IV Push two times a day  piperacillin/tazobactam IVPB.. 3.375 Gram(s) IV Intermittent every 8 hours  senna 1 Tablet(s) Oral daily    MEDICATIONS  (PRN):  acetaminophen   IVPB .. 725 milliGRAM(s) IV Intermittent every 6 hours PRN Mild Pain (1 - 3)  artificial  tears Solution 1 Drop(s) Both EYES daily PRN Dry Eyes  morphine  - Injectable 2 milliGRAM(s) IV Push every 4 hours PRN Severe Pain (7 - 10)  ondansetron Injectable 4 milliGRAM(s) IV Push every 6 hours PRN Nausea and/or Vomiting      HEENT:  pupils equal and reactive, EOMI, no oropharyngeal lesions, erythema, exudates, oral thrush  NECK:   supple, no carotid bruits  CV:  +S1, +S2, regular, no murmurs  RESP:   lungs clear to auscultation bilaterally, no wheezing, rales, rhonchi, good air entry bilaterally  GI:  abdomen soft, non-tender, non-distended, normal BS  EXT:  no clubbing, no cyanosis, no edema, no calf pain, swelling or erythema  NEURO:  AAOX3, no focal neurological deficits, follows all commands, able to move extremities spontaneously  SKIN:  no ulcers, lesions or rashes    LABS:                        7.5    7.76  )-----------( 115      ( 14 May 2024 06:55 )             22.2     05-14    139  |  111<H>  |  46<H>  ----------------------------<  119<H>  4.0   |  23  |  0.91    Ca    7.5<L>      14 May 2024 06:55  Phos  2.2     05-14  Mg     1.8     05-14    TPro  6.2  /  Alb  2.5<L>  /  TBili  0.4  /  DBili  x   /  AST  16  /  ALT  30  /  AlkPhos  57  05-13    LIVER FUNCTIONS - ( 13 May 2024 14:13 )  Alb: 2.5 g/dL / Pro: 6.2 gm/dL / ALK PHOS: 57 U/L / ALT: 30 U/L / AST: 16 U/L / GGT: x           Urinalysis Basic - ( 14 May 2024 06:55 )  Color: x / Appearance: x / SG: x / pH: x  Gluc: 119 mg/dL / Ketone: x  / Bili: x / Urobili: x   Blood: x / Protein: x / Nitrite: x   Leuk Esterase: x / RBC: x / WBC x   Sq Epi: x / Non Sq Epi: x / Bacteria: x      < from: CT Angio Abdomen and Pelvis w/ IV Cont (05.13.24 @ 15:36) >    IMPRESSION:  Contained perforation of the first portion of the duodenum.  No pneumoperitoneum, ascites or drainable fluid collection.  Severe superior mesenteric artery stenosis.    < end of copied text >

## 2024-05-14 NOTE — PHYSICAL THERAPY INITIAL EVALUATION ADULT - MODALITIES TREATMENT COMMENTS
Pt left OOB in chair with NC 5L/min Intact and IV Intact. CBIR. Pt instructed to not get up alone. Chair alarm activated. MARK diez

## 2024-05-14 NOTE — PHYSICAL THERAPY INITIAL EVALUATION ADULT - ADDITIONAL COMMENTS
Patient was ambulating Independently with RW and Cane at home PTA. Patient was mostly Independent with ADL Performance at home PTA. Patient requires some assistance via Son or her neighbors PTA. Patient still drives a car but only short distances to grocery store. Has her neighbors drive her to doctor's appointments and chemotherapy infusion sessions for her Leukemia. Patient admitted to 1 recent fall 1 week ago while leaving [place] with friend. Patient does not report any other recent falls.

## 2024-05-14 NOTE — PHYSICAL THERAPY INITIAL EVALUATION ADULT - GAIT DEVIATIONS NOTED, PT EVAL
MILD BALANCE IMPAIRMENT PRESENT - PATIENT WITH FREQUENT DIRECTIONAL PREFERENCE TOWARDS LEFT SIDE WHILE AMBULATING. REQUIRED TACTILE ASSISTANCE FOR STRAIGHT FORWARD HALLWAY AMBULATION AND OBSTACLE NAVIGATION

## 2024-05-14 NOTE — PHYSICAL THERAPY INITIAL EVALUATION ADULT - GENERAL OBSERVATIONS, REHAB EVAL
Pt rec'd supine in bed in NAD with NC 5L/min and IV both intact. Pt denied any pain or discomfort. Patient requesting food and water but remains NPO according to 5S MARK Frank

## 2024-05-14 NOTE — CONSULT NOTE ADULT - ASSESSMENT
89 y/o female with h/o myelodysplastic syndrome, HTN, HLD, hypothyroidism, PML, cataracts, and macular degeneration presents to the ED with 2 weeks of worsening abd pain.  In the ER, pt had CT abd pelvis which revealed a perforated but contained perforation in the 1st portion of the duodenum.  Patient admitted by surgery.      #Contained Perforation of the first portion of the duodenum:    Further management as per surgery.    NPO.  IVF.    Pain control.  Morphine PRN.  Stop toradol.  No NSAIDS with perforation.    Cont protonix-- increase to BID.    Cont Zosyn.    Suspect likely secondary to PUD.      #Anemia:    Hgb 7.5.    Trend.    Hemoccult stools.    Check iron/ b12/ folate in am.      #HTN:    Cont home losartan.    Stop HCTZ while NPO on IVF.      #Hypothyroid:    Cont synthroid.      #MDS:    Stable.      #DVT Proph:  Lovenox as per surgery.    T/c stopping if hgb drops further.      Thanks for the consult.  We will follow with you.   87 y/o female with h/o myelodysplastic syndrome, HTN, HLD, hypothyroidism, PML, cataracts, and macular degeneration presents to the ED with 2 weeks of worsening abd pain.  In the ER, pt had CT abd pelvis which revealed a perforated but contained perforation in the 1st portion of the duodenum.  Patient admitted by surgery.      #Contained Perforation of the first portion of the duodenum:    Further management as per surgery.    NPO.  IVF.    Pain control.  Morphine PRN.  Stop toradol.  No NSAIDS with perforation.    Cont protonix-- increase to BID.    Cont Zosyn.    Suspect likely secondary to PUD.      #MDS with Anemia:    Hgb 7.5.    Trend.    Hemoccult stools.    Check iron/ b12/ folate in am.    AS per old notes, patient has MDS with 10% blasts and requires PRN transfusions.      #HTN:    Cont home losartan.    Stop HCTZ while NPO on IVF.      #Hypothyroid:    Cont synthroid.      #DVT Proph:  Lovenox as per surgery.    T/c stopping if hgb drops further.      Thanks for the consult.  We will follow with you.   87 y/o female with h/o myelodysplastic syndrome, HTN, HLD, hypothyroidism, PML, cataracts, and macular degeneration presents to the ED with 2 weeks of worsening abd pain.  In the ER, pt had CT abd pelvis which revealed a perforated but contained perforation in the 1st portion of the duodenum.  Patient admitted by surgery.      #Contained Perforation of the first portion of the duodenum:    Further management as per surgery.    NPO.  IVF.    As per patient/ family-- trying more conservative treatment approach with IV ABX.    Not 100% against surgery if that doesn't work.    Pain control.  Morphine PRN.  Stop toradol.  No NSAIDS with perforation.    Cont protonix-- increase to BID.    Cont Zosyn.    Suspect likely secondary to perforated ulcer.      #MDS with Anemia:    Hgb 7.5.    Trend.    Hemoccult stools.    Check iron/ b12/ folate in am.    AS per old notes, patient has MDS with 10% blasts and requires PRN transfusions.    Transfuse if drops further.      #Abn CXR:    No SOB/ hypoxia.    Repeat CXR in am.      #HTN:    Cont home losartan.    Stop HCTZ while NPO on IVF.      #Hypothyroid:    Cont synthroid.      #DVT Proph:  Lovenox as per surgery.    T/c stopping if hgb drops further.      Thanks for the consult.  We will follow with you.

## 2024-05-14 NOTE — PHYSICAL THERAPY INITIAL EVALUATION ADULT - LIVES WITH, PROFILE
Patient lives alone but has friends/neighbors that assist patient when needed. Patient lives in a 2 level home with 4 ROB (But has stair lift on the outside to enter her home). Patient has 9 steps to second floor but resides on the main level of the home

## 2024-05-15 LAB
-  AMOXICILLIN/CLAVULANIC ACID: SIGNIFICANT CHANGE UP
-  AMPICILLIN/SULBACTAM: SIGNIFICANT CHANGE UP
-  AMPICILLIN: SIGNIFICANT CHANGE UP
-  AZTREONAM: SIGNIFICANT CHANGE UP
-  CEFAZOLIN: SIGNIFICANT CHANGE UP
-  CEFEPIME: SIGNIFICANT CHANGE UP
-  CEFOXITIN: SIGNIFICANT CHANGE UP
-  CEFTRIAXONE: SIGNIFICANT CHANGE UP
-  CEFUROXIME: SIGNIFICANT CHANGE UP
-  CIPROFLOXACIN: SIGNIFICANT CHANGE UP
-  ERTAPENEM: SIGNIFICANT CHANGE UP
-  GENTAMICIN: SIGNIFICANT CHANGE UP
-  IMIPENEM: SIGNIFICANT CHANGE UP
-  LEVOFLOXACIN: SIGNIFICANT CHANGE UP
-  MEROPENEM: SIGNIFICANT CHANGE UP
-  NITROFURANTOIN: SIGNIFICANT CHANGE UP
-  PIPERACILLIN/TAZOBACTAM: SIGNIFICANT CHANGE UP
-  TOBRAMYCIN: SIGNIFICANT CHANGE UP
-  TRIMETHOPRIM/SULFAMETHOXAZOLE: SIGNIFICANT CHANGE UP
BASOPHILS # BLD AUTO: 0.07 K/UL — SIGNIFICANT CHANGE UP (ref 0–0.2)
BASOPHILS NFR BLD AUTO: 1 % — SIGNIFICANT CHANGE UP (ref 0–2)
CULTURE RESULTS: ABNORMAL
EOSINOPHIL # BLD AUTO: 0.15 K/UL — SIGNIFICANT CHANGE UP (ref 0–0.5)
EOSINOPHIL NFR BLD AUTO: 2 % — SIGNIFICANT CHANGE UP (ref 0–6)
FERRITIN SERPL-MCNC: 5757 NG/ML — HIGH (ref 13–330)
FOLATE SERPL-MCNC: >20 NG/ML — SIGNIFICANT CHANGE UP
GLUCOSE BLDC GLUCOMTR-MCNC: 104 MG/DL — HIGH (ref 70–99)
GLUCOSE BLDC GLUCOMTR-MCNC: 211 MG/DL — HIGH (ref 70–99)
GLUCOSE BLDC GLUCOMTR-MCNC: 310 MG/DL — HIGH (ref 70–99)
HCT VFR BLD CALC: 20.8 % — CRITICAL LOW (ref 34.5–45)
HGB BLD-MCNC: 6.9 G/DL — CRITICAL LOW (ref 11.5–15.5)
IMM GRANULOCYTES NFR BLD AUTO: 4.9 % — HIGH (ref 0–0.9)
IRON SATN MFR SERPL: 122 UG/DL — SIGNIFICANT CHANGE UP (ref 30–160)
IRON SATN MFR SERPL: 62 % — HIGH (ref 14–50)
LYMPHOCYTES # BLD AUTO: 1.19 K/UL — SIGNIFICANT CHANGE UP (ref 1–3.3)
LYMPHOCYTES # BLD AUTO: 16.2 % — SIGNIFICANT CHANGE UP (ref 13–44)
MCHC RBC-ENTMCNC: 28 PG — SIGNIFICANT CHANGE UP (ref 27–34)
MCHC RBC-ENTMCNC: 33.2 GM/DL — SIGNIFICANT CHANGE UP (ref 32–36)
MCV RBC AUTO: 84.6 FL — SIGNIFICANT CHANGE UP (ref 80–100)
METHOD TYPE: SIGNIFICANT CHANGE UP
MONOCYTES # BLD AUTO: 1.48 K/UL — HIGH (ref 0–0.9)
MONOCYTES NFR BLD AUTO: 20.1 % — HIGH (ref 2–14)
NEUTROPHILS # BLD AUTO: 4.11 K/UL — SIGNIFICANT CHANGE UP (ref 1.8–7.4)
NEUTROPHILS NFR BLD AUTO: 55.8 % — SIGNIFICANT CHANGE UP (ref 43–77)
ORGANISM # SPEC MICROSCOPIC CNT: ABNORMAL
ORGANISM # SPEC MICROSCOPIC CNT: SIGNIFICANT CHANGE UP
PLATELET # BLD AUTO: 118 K/UL — LOW (ref 150–400)
RBC # BLD: 2.46 M/UL — LOW (ref 3.8–5.2)
RBC # FLD: 14.9 % — HIGH (ref 10.3–14.5)
SPECIMEN SOURCE: SIGNIFICANT CHANGE UP
TIBC SERPL-MCNC: 199 UG/DL — LOW (ref 220–430)
UIBC SERPL-MCNC: 77 UG/DL — LOW (ref 110–370)
VIT B12 SERPL-MCNC: 1551 PG/ML — HIGH (ref 232–1245)
WBC # BLD: 7.31 K/UL — SIGNIFICANT CHANGE UP (ref 3.8–10.5)
WBC # FLD AUTO: 7.31 K/UL — SIGNIFICANT CHANGE UP (ref 3.8–10.5)

## 2024-05-15 PROCEDURE — 99233 SBSQ HOSP IP/OBS HIGH 50: CPT

## 2024-05-15 PROCEDURE — 93010 ELECTROCARDIOGRAM REPORT: CPT

## 2024-05-15 PROCEDURE — 71045 X-RAY EXAM CHEST 1 VIEW: CPT | Mod: 26

## 2024-05-15 RX ORDER — SODIUM CHLORIDE 9 MG/ML
1000 INJECTION, SOLUTION INTRAVENOUS
Refills: 0 | Status: DISCONTINUED | OUTPATIENT
Start: 2024-05-15 | End: 2024-05-16

## 2024-05-15 RX ORDER — INSULIN LISPRO 100/ML
VIAL (ML) SUBCUTANEOUS EVERY 6 HOURS
Refills: 0 | Status: DISCONTINUED | OUTPATIENT
Start: 2024-05-15 | End: 2024-05-15

## 2024-05-15 RX ORDER — GLUCAGON INJECTION, SOLUTION 0.5 MG/.1ML
1 INJECTION, SOLUTION SUBCUTANEOUS ONCE
Refills: 0 | Status: DISCONTINUED | OUTPATIENT
Start: 2024-05-15 | End: 2024-05-16

## 2024-05-15 RX ORDER — FUROSEMIDE 40 MG
40 TABLET ORAL ONCE
Refills: 0 | Status: COMPLETED | OUTPATIENT
Start: 2024-05-15 | End: 2024-05-15

## 2024-05-15 RX ORDER — INSULIN LISPRO 100/ML
VIAL (ML) SUBCUTANEOUS
Refills: 0 | Status: DISCONTINUED | OUTPATIENT
Start: 2024-05-15 | End: 2024-05-16

## 2024-05-15 RX ORDER — DEXTROSE MONOHYDRATE, SODIUM CHLORIDE, AND POTASSIUM CHLORIDE 50; .745; 4.5 G/1000ML; G/1000ML; G/1000ML
1000 INJECTION, SOLUTION INTRAVENOUS
Refills: 0 | Status: DISCONTINUED | OUTPATIENT
Start: 2024-05-15 | End: 2024-05-15

## 2024-05-15 RX ORDER — FUROSEMIDE 40 MG
20 TABLET ORAL ONCE
Refills: 0 | Status: COMPLETED | OUTPATIENT
Start: 2024-05-15 | End: 2024-05-15

## 2024-05-15 RX ORDER — INSULIN LISPRO 100/ML
VIAL (ML) SUBCUTANEOUS
Refills: 0 | Status: DISCONTINUED | OUTPATIENT
Start: 2024-05-15 | End: 2024-05-15

## 2024-05-15 RX ORDER — DEXTROSE 50 % IN WATER 50 %
12.5 SYRINGE (ML) INTRAVENOUS ONCE
Refills: 0 | Status: DISCONTINUED | OUTPATIENT
Start: 2024-05-15 | End: 2024-05-16

## 2024-05-15 RX ORDER — IPRATROPIUM/ALBUTEROL SULFATE 18-103MCG
3 AEROSOL WITH ADAPTER (GRAM) INHALATION ONCE
Refills: 0 | Status: COMPLETED | OUTPATIENT
Start: 2024-05-15 | End: 2024-05-15

## 2024-05-15 RX ORDER — DEXTROSE 10 % IN WATER 10 %
125 INTRAVENOUS SOLUTION INTRAVENOUS ONCE
Refills: 0 | Status: DISCONTINUED | OUTPATIENT
Start: 2024-05-15 | End: 2024-05-16

## 2024-05-15 RX ORDER — HEPARIN SODIUM 5000 [USP'U]/ML
5000 INJECTION INTRAVENOUS; SUBCUTANEOUS EVERY 8 HOURS
Refills: 0 | Status: DISCONTINUED | OUTPATIENT
Start: 2024-05-16 | End: 2024-05-16

## 2024-05-15 RX ORDER — DEXTROSE 50 % IN WATER 50 %
15 SYRINGE (ML) INTRAVENOUS ONCE
Refills: 0 | Status: DISCONTINUED | OUTPATIENT
Start: 2024-05-15 | End: 2024-05-16

## 2024-05-15 RX ORDER — ACETAMINOPHEN 500 MG
700 TABLET ORAL EVERY 8 HOURS
Refills: 0 | Status: DISCONTINUED | OUTPATIENT
Start: 2024-05-15 | End: 2024-05-16

## 2024-05-15 RX ORDER — INSULIN LISPRO 100/ML
VIAL (ML) SUBCUTANEOUS AT BEDTIME
Refills: 0 | Status: DISCONTINUED | OUTPATIENT
Start: 2024-05-15 | End: 2024-05-15

## 2024-05-15 RX ORDER — DEXTROSE 50 % IN WATER 50 %
25 SYRINGE (ML) INTRAVENOUS ONCE
Refills: 0 | Status: DISCONTINUED | OUTPATIENT
Start: 2024-05-15 | End: 2024-05-16

## 2024-05-15 RX ADMIN — LOSARTAN POTASSIUM 100 MILLIGRAM(S): 100 TABLET, FILM COATED ORAL at 10:22

## 2024-05-15 RX ADMIN — Medication 145 MILLIGRAM(S): at 10:21

## 2024-05-15 RX ADMIN — Medication 500 MILLIGRAM(S): at 10:22

## 2024-05-15 RX ADMIN — PANTOPRAZOLE SODIUM 40 MILLIGRAM(S): 20 TABLET, DELAYED RELEASE ORAL at 10:22

## 2024-05-15 RX ADMIN — Medication 3 MILLILITER(S): at 08:36

## 2024-05-15 RX ADMIN — Medication 1 TABLET(S): at 10:21

## 2024-05-15 RX ADMIN — Medication 1000 UNIT(S): at 10:21

## 2024-05-15 RX ADMIN — PIPERACILLIN AND TAZOBACTAM 25 GRAM(S): 4; .5 INJECTION, POWDER, LYOPHILIZED, FOR SOLUTION INTRAVENOUS at 13:11

## 2024-05-15 RX ADMIN — Medication 20 MILLIGRAM(S): at 10:22

## 2024-05-15 RX ADMIN — ESCITALOPRAM OXALATE 10 MILLIGRAM(S): 10 TABLET, FILM COATED ORAL at 10:21

## 2024-05-15 RX ADMIN — Medication 50 MICROGRAM(S): at 05:37

## 2024-05-15 RX ADMIN — Medication 2: at 22:32

## 2024-05-15 RX ADMIN — Medication 40 MILLIGRAM(S): at 02:20

## 2024-05-15 RX ADMIN — Medication 4: at 13:17

## 2024-05-15 RX ADMIN — Medication 40 MILLIGRAM(S): at 02:38

## 2024-05-15 RX ADMIN — PIPERACILLIN AND TAZOBACTAM 25 GRAM(S): 4; .5 INJECTION, POWDER, LYOPHILIZED, FOR SOLUTION INTRAVENOUS at 05:38

## 2024-05-15 RX ADMIN — SENNA PLUS 1 TABLET(S): 8.6 TABLET ORAL at 10:21

## 2024-05-15 RX ADMIN — ATORVASTATIN CALCIUM 80 MILLIGRAM(S): 80 TABLET, FILM COATED ORAL at 22:31

## 2024-05-15 RX ADMIN — Medication 81 MILLIGRAM(S): at 10:21

## 2024-05-15 RX ADMIN — ENOXAPARIN SODIUM 40 MILLIGRAM(S): 100 INJECTION SUBCUTANEOUS at 05:39

## 2024-05-15 NOTE — DIETITIAN INITIAL EVALUATION ADULT - ORAL INTAKE PTA/DIET HISTORY
Unable to obtain intake/ diet hx pta 2/2 pt upset and not forthcoming with information upon RD visit. Per H&P, pt had abdominal pain x 2 weeks pta, n/v, and poor appetite.

## 2024-05-15 NOTE — DIETITIAN INITIAL EVALUATION ADULT - ADD RECOMMEND
1) Advance diet to Low Fiber as soon as medically feasible  2) Add ensure plus high protein BID to optimize PO intake (provides 350 kcal, 20g protein/ shake) when diet is advanced  3) Obtain vitamin D 25OH level to assess nutriture  4) Please obtain daily weights  5) Consider adding thiamine 200 mg daily 2/2 poor PO intake/ malnutrition   6) MVI w/ minerals daily to ensure 100% RDA met  7) Encourage protein-rich foods, maximize food preferences  8) Monitor bowel movements, if no BM for >3 days, consider implementing bowel regimen.  9) Consider adding appetite stimulant such as Remeron or Marinol 2/2 chronically poor appetite/ PO intake  10) Monitor closely for refeeding syndrome - please obtain BMP, Mg, and Phos levels. Monitor and replete K, Phos, Mg prn if begins to downtrend. If nutrition support is initiated, recommend to check refeeding labs BID x 2-3 days upon initiation and then will reevaluate.   11) Confirm goals of care regarding nutrition support - if pt's diet cannot be advanced within the next 24-48 hours, consider initiating PN  RD will continue to monitor PO intake, labs, hydration, and wt prn.

## 2024-05-15 NOTE — DIETITIAN INITIAL EVALUATION ADULT - PERTINENT LABORATORY DATA
05-15    137  |  109<H>  |  30<H>  ----------------------------<  234<H>  3.8   |  22  |  0.94    Ca    7.0<L>      15 May 2024 08:04  Phos  3.5     05-15  Mg     1.8     05-15    TPro  5.5<L>  /  Alb  2.1<L>  /  TBili  0.5  /  DBili  x   /  AST  35  /  ALT  40  /  AlkPhos  69  05-15    Iron Total: 122 ug/dL (05-15-24 @ 08:04)

## 2024-05-15 NOTE — CONSULT NOTE ADULT - SUBJECTIVE AND OBJECTIVE BOX
Reason for consult: MDS    HPI:  87yo F with h/o myelodysplastic syndrome, HTN, HLD, hypothyroidism, PML, cataracts, and macular degeneration presents to the ED with 2 weeks of worsening abd pain. The patient describes pain to the RUQ that has been increasing in intensity, associated with nausea, vomiting, and low appetite. Patient was seen for scheduled transfusion today two days prior and  referred to ED. She denies hematemesis, dark stools, change in bowel habits, fevers, or chills. She notes h/o "heartburn" which she treats with OTC medication. No prior endoscopy. Since admission has been found to have perforated duodenal ulcer. She is currently on abx and reports improvement in abdominal symptoms.    PAST MEDICAL & SURGICAL HISTORY:  Anemia      Acute promyelocytic leukemia      HTN (hypertension)      HLD (hyperlipidemia)      Hypothyroidism      Macular degeneration  right      Cataract  left eye      H/O lumpectomy  Left side in , performed by Dr. Samuels at       Fracture of femur  right ac right          FAMILY HISTORY:  Patient's father is   Hx of spine cancer    Patient's mother is   Hx of breast and bone cancer    Family history of cancer in brother  Colon cancer        Alochol: Denied  Smoking: Nonsmoker  Drug Use: Denied  Marital Status:         Allergies    No Known Allergies    Intolerances        MEDICATIONS  (STANDING):  ascorbic acid 500 milliGRAM(s) Oral daily  aspirin enteric coated 81 milliGRAM(s) Oral daily  atorvastatin 80 milliGRAM(s) Oral at bedtime  calcium carbonate    500 mG (Tums) Chewable 1 Tablet(s) Chew daily  cholecalciferol 1000 Unit(s) Oral daily  dextrose 10% Bolus 125 milliLiter(s) IV Bolus once  dextrose 5%. 1000 milliLiter(s) (100 mL/Hr) IV Continuous <Continuous>  dextrose 5%. 1000 milliLiter(s) (50 mL/Hr) IV Continuous <Continuous>  dextrose 50% Injectable 25 Gram(s) IV Push once  dextrose 50% Injectable 12.5 Gram(s) IV Push once  escitalopram 10 milliGRAM(s) Oral daily  fenofibrate Tablet 145 milliGRAM(s) Oral daily  glucagon  Injectable 1 milliGRAM(s) IntraMuscular once  insulin lispro (ADMELOG) corrective regimen sliding scale   SubCutaneous Before meals and at bedtime  levothyroxine 50 MICROGram(s) Oral daily  losartan 100 milliGRAM(s) Oral daily  pantoprazole  Injectable 40 milliGRAM(s) IV Push two times a day  piperacillin/tazobactam IVPB.. 3.375 Gram(s) IV Intermittent every 8 hours  senna 1 Tablet(s) Oral daily    MEDICATIONS  (PRN):  acetaminophen   IVPB .. 725 milliGRAM(s) IV Intermittent every 6 hours PRN Mild Pain (1 - 3)  acetaminophen   IVPB .. 700 milliGRAM(s) IV Intermittent every 8 hours PRN Moderate Pain (4 - 6)  artificial  tears Solution 1 Drop(s) Both EYES daily PRN Dry Eyes  dextrose Oral Gel 15 Gram(s) Oral once PRN Blood Glucose LESS THAN 70 milliGRAM(s)/deciliter  morphine  - Injectable 2 milliGRAM(s) IV Push every 8 hours PRN Severe Pain (7 - 10)  ondansetron Injectable 4 milliGRAM(s) IV Push every 6 hours PRN Nausea and/or Vomiting      ROS  abd pain    T(C): 37.1 (05-15-24 @ 20:36), Max: 37.6 (24 @ 21:48)  HR: 89 (05-15-24 @ 20:36) (82 - 95)  BP: 140/61 (05-15-24 @ 20:36) (128/54 - 167/51)  RR: 19 (05-15-24 @ 20:36) (18 - 20)  SpO2: 95% (05-15-24 @ 20:36) (93% - 99%)  Wt(kg): --    PE  NAD  Awake, alert    No rash grossly  FROM                          6.9    7.31  )-----------( 118      ( 15 May 2024 08:04 )             20.8       05-15    137  |  109<H>  |  30<H>  ----------------------------<  234<H>  3.8   |  22  |  0.94    Ca    7.0<L>      15 May 2024 08:04  Phos  3.5     05-15  Mg     1.8     05-15    TPro  5.5<L>  /  Alb  2.1<L>  /  TBili  0.5  /  DBili  x   /  AST  35  /  ALT  40  /  AlkPhos  69  05-15

## 2024-05-15 NOTE — DIETITIAN INITIAL EVALUATION ADULT - ETIOLOGY
r/t decreased ability to meet increased nutrient needs 2/2 contained perforation of the 1st segment of the duodenum, nausea, vomiting, poor po, abd pain

## 2024-05-15 NOTE — CONSULT NOTE ADULT - ASSESSMENT
88 year old F hx of breast ca 2009 s/p chemothreapy, MDS on retacrit, requiring multiple transfusions, presents with abdominal pain    1) Abdominal Pain  found to have contained perforated duodenal ulcer  surgery transferred to medicine service  on IV abx with improvement    2) Anemia  on retacrit 60 K weekly, will order while in hospital  transfuse for goal hgb > 7    f/u with Dr Kirk after dc    Thank you for the courtesy of this consultation and we will continue to follow.    Zoltan Viveros MD  [facility]  Cell: [phone]

## 2024-05-15 NOTE — CHART NOTE - NSCHARTNOTEFT_GEN_A_CORE
MEDICINE NP    Notified by RN patient with increased work of breathing. Seen and examined patient at bedside. Patient is alert, NAD. Denies HA, CP, cough, N/V, or abd pain. Patient complaint of not being able to breath. PAtient with audible rhonchi and trying to sit up for comfort. Patient received treatment to correct respiratory distress.    VITAL SIGNS:  T(C): 37.6 (05-14-24 @ 21:48), Max: 37.7 (05-14-24 @ 07:59)  HR: 94 (05-14-24 @ 21:48) (85 - 94)  BP: 132/51 (05-14-24 @ 21:48) (120/57 - 133/52)  RR: 18 (05-14-24 @ 21:48) (18 - 18)  SpO2: 94% (05-14-24 @ 21:48) (93% - 96%)  Wt(kg): --      LABORATORY:                          7.5    7.76  )-----------( 115      ( 14 May 2024 06:55 )             22.2       05-14    139  |  111<H>  |  46<H>  ----------------------------<  119<H>  4.0   |  23  |  0.91    Ca    7.5<L>      14 May 2024 06:55  Phos  2.2     05-14  Mg     1.8     05-14    TPro  6.2  /  Alb  2.5<L>  /  TBili  0.4  /  DBili  x   /  AST  16  /  ALT  30  /  AlkPhos  57  05-13          MICROBIOLOGY:           RADIOLOGY:          PHYSICAL EXAM:    Constitutional: AOx3. in respiratory distress    Respiratory: with bilateral wheezing, rhonchi, or crackles.    Cardiovascular: S1 S2. No murmurs.    Gastrointestinal: BS X4 active. soft. nontender.    Extremities/Vascular: +2 pulses bilaterally. No BLE edema.      ASSESSMENT/PLAN:   HPI:  87yo F with h/o myelodysplastic syndrome, HTN, HLD, hypothyroidism, PML, cataracts, and macular degeneration presents to the ED with 2 weeks of worsening abd pain. The patient describes pain to the RUQ that has been increasing in intensity, associated with nausea, vomiting, and low appetite. Patient was seen for scheduled transfusion today and referred to ED. She denies hematemesis, dark stools, change in bowel habits, fevers, or chills. She notes h/o "heartburn" which she treats with OTC medication. No prior endoscopy.  Now with SOB and increase work of breathing        1) Increase work of breathing  -Lasix   -Duo neb  -solumedrol  -BIpap  -CXR shows Pulm edema  -c/w   -F/U primary team in AM

## 2024-05-15 NOTE — PROGRESS NOTE ADULT - NUTRITIONAL ASSESSMENT
This patient has been assessed with a concern for Malnutrition and has been determined to have a diagnosis/diagnoses of Severe protein-calorie malnutrition.    This patient is being managed with:   Diet Clear Liquid-  Entered: May 15 2024 12:02PM

## 2024-05-15 NOTE — DIETITIAN INITIAL EVALUATION ADULT - OTHER INFO
87 y/o F with a PMHx of myelodysplastic syndrome, HTN, HLD, hypothyroidism, PML, cataracts, and macular degeneration presented to the ED with 2 weeks of worsening abd pain. The patient describes pain to the RUQ that has been increasing in intensity, associated with nausea, vomiting, and low appetite. Patient was seen for scheduled transfusion today and referred to ED. Admitted for contained perforation of the 1st segment of the duodenum. Surgery following, pt would like to avoid surgery if possible. Noted to be in fluid overload. Possible UGI studies today    Visited pt this morning, pt upset and not forthcoming with information upon RD visit. Pt NPO at time of visit and did not want to talk to RD at time of visit because she was upset about being NPO. RD unable to obtain bedscale wt at time of visit as pt OOB in chair at time of visit. RN obtained bedscale wt on 5/13 - 103#. Weight hx reviewed: 107# (taken by RD on 2/15/24; met criteria for severe malnutrition); weight loss of 4# (3.7%) x 2 months - not clinsig. Pt thin, frail appearing. Unable to perform physical NFPE, however visual NFPE reveals moderate to severe muscle/ fat wasting, pt continues to meet criteria for PCM at this time. Recommend to advance diet to Low Fiber as soon as medically feasible. Will trial Ensure Plus High Protein BID when diet is advanced. If pt's diet cannot be advanced within the next 24-48 hours, consider initiating PN. Monitor closely for refeeding syndrome - please obtain BMP, Mg, and Phos levels. Monitor and replete K, Phos, Mg prn if begins to downtrend. If nutrition support is initiated, recommend to check refeeding labs BID x 2-3 days upon initiation and then will reevaluate. Consider adding 200mg of thiamine and MVI w/ minerals daily. Please see additional recommendations below.

## 2024-05-15 NOTE — DIETITIAN INITIAL EVALUATION ADULT - PERTINENT MEDS FT
MEDICATIONS  (STANDING):  ascorbic acid 500 milliGRAM(s) Oral daily  aspirin enteric coated 81 milliGRAM(s) Oral daily  atorvastatin 80 milliGRAM(s) Oral at bedtime  calcium carbonate    500 mG (Tums) Chewable 1 Tablet(s) Chew daily  cholecalciferol 1000 Unit(s) Oral daily  dextrose 10% Bolus 125 milliLiter(s) IV Bolus once  dextrose 5% + sodium chloride 0.45% with potassium chloride 20 mEq/L 1000 milliLiter(s) (100 mL/Hr) IV Continuous <Continuous>  dextrose 5%. 1000 milliLiter(s) (100 mL/Hr) IV Continuous <Continuous>  dextrose 5%. 1000 milliLiter(s) (50 mL/Hr) IV Continuous <Continuous>  dextrose 50% Injectable 12.5 Gram(s) IV Push once  dextrose 50% Injectable 25 Gram(s) IV Push once  escitalopram 10 milliGRAM(s) Oral daily  fenofibrate Tablet 145 milliGRAM(s) Oral daily  glucagon  Injectable 1 milliGRAM(s) IntraMuscular once  insulin lispro (ADMELOG) corrective regimen sliding scale   SubCutaneous every 6 hours  levothyroxine 50 MICROGram(s) Oral daily  losartan 100 milliGRAM(s) Oral daily  pantoprazole  Injectable 40 milliGRAM(s) IV Push two times a day  piperacillin/tazobactam IVPB.. 3.375 Gram(s) IV Intermittent every 8 hours  senna 1 Tablet(s) Oral daily    MEDICATIONS  (PRN):  acetaminophen   IVPB .. 725 milliGRAM(s) IV Intermittent every 6 hours PRN Mild Pain (1 - 3)  acetaminophen   IVPB .. 700 milliGRAM(s) IV Intermittent every 8 hours PRN Moderate Pain (4 - 6)  artificial  tears Solution 1 Drop(s) Both EYES daily PRN Dry Eyes  dextrose Oral Gel 15 Gram(s) Oral once PRN Blood Glucose LESS THAN 70 milliGRAM(s)/deciliter  morphine  - Injectable 2 milliGRAM(s) IV Push every 8 hours PRN Severe Pain (7 - 10)  ondansetron Injectable 4 milliGRAM(s) IV Push every 6 hours PRN Nausea and/or Vomiting    Home Medications:  ascorbic acid 500 mg oral tablet: 1 tab(s) orally once a day (13 May 2024 23:27)  aspirin 81 mg oral tablet: 1 tab(s) orally once a day (13 May 2024 23:27)  Caltrate 600 mg oral tablet: 1 tab(s) orally once a day (13 May 2024 23:27)  cholecalciferol 25 mcg (1000 intl units) oral tablet: 1 tab(s) orally once a day (13 May 2024 23:27)  escitalopram 10 mg oral tablet: 1 tab(s) orally once a day (13 May 2024 23:27)  fenofibrate 160 mg oral tablet: 1 tab(s) orally once a day (13 May 2024 23:27)  hydroCHLOROthiazide 12.5 mg oral tablet: 1 tab(s) orally once a day (13 May 2024 23:27)  levothyroxine 50 mcg (0.05 mg) oral tablet: 1 tab(s) orally once a day (13 May 2024 23:27)  losartan 100 mg oral tablet: 1 tab(s) orally once a day (13 May 2024 23:27)  oxycodone-acetaminophen 5 mg-325 mg oral tablet: 1 tab(s) orally every 6 hours as needed for  moderate pain (13 May 2024 23:28)  PreserVision AREDS 2 oral capsule: 1 cap(s) orally once a day (13 May 2024 23:27)  Probiotic Formula oral capsule: 1 cap(s) orally once a day (13 May 2024 23:27)  Refresh ophthalmic solution: 1 drop(s) in each eye once a day as needed for (13 May 2024 23:27)  rosuvastatin 20 mg oral tablet: 1 tab(s) orally once a day (13 May 2024 23:27)

## 2024-05-16 ENCOUNTER — TRANSCRIPTION ENCOUNTER (OUTPATIENT)
Age: 89
End: 2024-05-16

## 2024-05-16 VITALS
HEART RATE: 86 BPM | OXYGEN SATURATION: 92 % | TEMPERATURE: 98 F | DIASTOLIC BLOOD PRESSURE: 73 MMHG | SYSTOLIC BLOOD PRESSURE: 143 MMHG | RESPIRATION RATE: 19 BRPM

## 2024-05-16 LAB
ALBUMIN SERPL ELPH-MCNC: 2 G/DL — LOW (ref 3.3–5)
ALP SERPL-CCNC: 68 U/L — SIGNIFICANT CHANGE UP (ref 40–120)
ALT FLD-CCNC: 44 U/L — SIGNIFICANT CHANGE UP (ref 12–78)
ANION GAP SERPL CALC-SCNC: 5 MMOL/L — SIGNIFICANT CHANGE UP (ref 5–17)
AST SERPL-CCNC: 36 U/L — SIGNIFICANT CHANGE UP (ref 15–37)
BASOPHILS # BLD AUTO: 0.2 K/UL — SIGNIFICANT CHANGE UP (ref 0–0.2)
BASOPHILS NFR BLD AUTO: 3 % — HIGH (ref 0–2)
BILIRUB SERPL-MCNC: 0.4 MG/DL — SIGNIFICANT CHANGE UP (ref 0.2–1.2)
BUN SERPL-MCNC: 26 MG/DL — HIGH (ref 7–23)
CALCIUM SERPL-MCNC: 6.8 MG/DL — LOW (ref 8.5–10.1)
CHLORIDE SERPL-SCNC: 110 MMOL/L — HIGH (ref 96–108)
CO2 SERPL-SCNC: 24 MMOL/L — SIGNIFICANT CHANGE UP (ref 22–31)
CREAT SERPL-MCNC: 0.91 MG/DL — SIGNIFICANT CHANGE UP (ref 0.5–1.3)
EGFR: 61 ML/MIN/1.73M2 — SIGNIFICANT CHANGE UP
EOSINOPHIL # BLD AUTO: 0.8 K/UL — HIGH (ref 0–0.5)
EOSINOPHIL NFR BLD AUTO: 12 % — HIGH (ref 0–6)
GLUCOSE BLDC GLUCOMTR-MCNC: 214 MG/DL — HIGH (ref 70–99)
GLUCOSE BLDC GLUCOMTR-MCNC: 241 MG/DL — HIGH (ref 70–99)
GLUCOSE SERPL-MCNC: 125 MG/DL — HIGH (ref 70–99)
HCT VFR BLD CALC: 23.8 % — LOW (ref 34.5–45)
HGB BLD-MCNC: 7.8 G/DL — LOW (ref 11.5–15.5)
LYMPHOCYTES # BLD AUTO: 1.27 K/UL — SIGNIFICANT CHANGE UP (ref 1–3.3)
LYMPHOCYTES # BLD AUTO: 19 % — SIGNIFICANT CHANGE UP (ref 13–44)
MAGNESIUM SERPL-MCNC: 1.9 MG/DL — SIGNIFICANT CHANGE UP (ref 1.6–2.6)
MCHC RBC-ENTMCNC: 28.3 PG — SIGNIFICANT CHANGE UP (ref 27–34)
MCHC RBC-ENTMCNC: 32.8 GM/DL — SIGNIFICANT CHANGE UP (ref 32–36)
MCV RBC AUTO: 86.2 FL — SIGNIFICANT CHANGE UP (ref 80–100)
MONOCYTES # BLD AUTO: 1.4 K/UL — HIGH (ref 0–0.9)
MONOCYTES NFR BLD AUTO: 21 % — HIGH (ref 2–14)
NEUTROPHILS # BLD AUTO: 3 K/UL — SIGNIFICANT CHANGE UP (ref 1.8–7.4)
NEUTROPHILS NFR BLD AUTO: 45 % — SIGNIFICANT CHANGE UP (ref 43–77)
NRBC # BLD: SIGNIFICANT CHANGE UP /100 WBCS (ref 0–0)
PHOSPHATE SERPL-MCNC: 2.2 MG/DL — LOW (ref 2.5–4.5)
PLATELET # BLD AUTO: 116 K/UL — LOW (ref 150–400)
POTASSIUM SERPL-MCNC: 3.4 MMOL/L — LOW (ref 3.5–5.3)
POTASSIUM SERPL-SCNC: 3.4 MMOL/L — LOW (ref 3.5–5.3)
PROT SERPL-MCNC: 5.4 GM/DL — LOW (ref 6–8.3)
RBC # BLD: 2.76 M/UL — LOW (ref 3.8–5.2)
RBC # FLD: 14.8 % — HIGH (ref 10.3–14.5)
SODIUM SERPL-SCNC: 139 MMOL/L — SIGNIFICANT CHANGE UP (ref 135–145)
WBC # BLD: 6.66 K/UL — SIGNIFICANT CHANGE UP (ref 3.8–10.5)
WBC # FLD AUTO: 6.66 K/UL — SIGNIFICANT CHANGE UP (ref 3.8–10.5)

## 2024-05-16 PROCEDURE — 99238 HOSP IP/OBS DSCHRG MGMT 30/<: CPT

## 2024-05-16 PROCEDURE — 74240 X-RAY XM UPR GI TRC 1CNTRST: CPT | Mod: 26

## 2024-05-16 RX ORDER — INSULIN GLARGINE 100 [IU]/ML
6 INJECTION, SOLUTION SUBCUTANEOUS AT BEDTIME
Refills: 0 | Status: DISCONTINUED | OUTPATIENT
Start: 2024-05-16 | End: 2024-05-16

## 2024-05-16 RX ORDER — INSULIN LISPRO 100/ML
2 VIAL (ML) SUBCUTANEOUS
Refills: 0 | Status: DISCONTINUED | OUTPATIENT
Start: 2024-05-16 | End: 2024-05-16

## 2024-05-16 RX ORDER — INSULIN LISPRO 100/ML
VIAL (ML) SUBCUTANEOUS
Refills: 0 | Status: DISCONTINUED | OUTPATIENT
Start: 2024-05-16 | End: 2024-05-16

## 2024-05-16 RX ORDER — INSULIN LISPRO 100/ML
VIAL (ML) SUBCUTANEOUS AT BEDTIME
Refills: 0 | Status: DISCONTINUED | OUTPATIENT
Start: 2024-05-16 | End: 2024-05-16

## 2024-05-16 RX ADMIN — Medication 1000 UNIT(S): at 10:15

## 2024-05-16 RX ADMIN — Medication 1 TABLET(S): at 10:15

## 2024-05-16 RX ADMIN — Medication 2: at 12:06

## 2024-05-16 RX ADMIN — PANTOPRAZOLE SODIUM 40 MILLIGRAM(S): 20 TABLET, DELAYED RELEASE ORAL at 00:01

## 2024-05-16 RX ADMIN — SENNA PLUS 1 TABLET(S): 8.6 TABLET ORAL at 10:17

## 2024-05-16 RX ADMIN — PIPERACILLIN AND TAZOBACTAM 25 GRAM(S): 4; .5 INJECTION, POWDER, LYOPHILIZED, FOR SOLUTION INTRAVENOUS at 08:13

## 2024-05-16 RX ADMIN — Medication 145 MILLIGRAM(S): at 10:14

## 2024-05-16 RX ADMIN — LOSARTAN POTASSIUM 100 MILLIGRAM(S): 100 TABLET, FILM COATED ORAL at 10:17

## 2024-05-16 RX ADMIN — PANTOPRAZOLE SODIUM 40 MILLIGRAM(S): 20 TABLET, DELAYED RELEASE ORAL at 10:17

## 2024-05-16 RX ADMIN — Medication 81 MILLIGRAM(S): at 10:14

## 2024-05-16 RX ADMIN — Medication 50 MICROGRAM(S): at 05:11

## 2024-05-16 RX ADMIN — PIPERACILLIN AND TAZOBACTAM 25 GRAM(S): 4; .5 INJECTION, POWDER, LYOPHILIZED, FOR SOLUTION INTRAVENOUS at 00:01

## 2024-05-16 RX ADMIN — ESCITALOPRAM OXALATE 10 MILLIGRAM(S): 10 TABLET, FILM COATED ORAL at 10:15

## 2024-05-16 RX ADMIN — Medication 500 MILLIGRAM(S): at 10:15

## 2024-05-16 RX ADMIN — HEPARIN SODIUM 5000 UNIT(S): 5000 INJECTION INTRAVENOUS; SUBCUTANEOUS at 15:17

## 2024-05-16 NOTE — DISCHARGE NOTE PROVIDER - NSDCMRMEDTOKEN_GEN_ALL_CORE_FT
ascorbic acid 500 mg oral tablet: 1 tab(s) orally once a day  aspirin 81 mg oral tablet: 1 tab(s) orally once a day  Caltrate 600 mg oral tablet: 1 tab(s) orally once a day  cholecalciferol 25 mcg (1000 intl units) oral tablet: 1 tab(s) orally once a day  escitalopram 10 mg oral tablet: 1 tab(s) orally once a day  fenofibrate 160 mg oral tablet: 1 tab(s) orally once a day  hydroCHLOROthiazide 12.5 mg oral tablet: 1 tab(s) orally once a day  levothyroxine 50 mcg (0.05 mg) oral tablet: 1 tab(s) orally once a day  losartan 100 mg oral tablet: 1 tab(s) orally once a day  oxycodone-acetaminophen 5 mg-325 mg oral tablet: 1 tab(s) orally every 6 hours as needed for  moderate pain  PreserVision AREDS 2 oral capsule: 1 cap(s) orally once a day  Probiotic Formula oral capsule: 1 cap(s) orally once a day  Refresh ophthalmic solution: 1 drop(s) in each eye once a day as needed for  rosuvastatin 20 mg oral tablet: 1 tab(s) orally once a day

## 2024-05-16 NOTE — DISCHARGE NOTE NURSING/CASE MANAGEMENT/SOCIAL WORK - NSDCPEFALRISK_GEN_ALL_CORE
For information on Fall & Injury Prevention, visit: [email or web address] OR  [email or web address] OR  https://www.cdc.gov/steadi/patient.html

## 2024-05-16 NOTE — DISCHARGE NOTE NURSING/CASE MANAGEMENT/SOCIAL WORK - PATIENT PORTAL LINK FT
You can access the FollowMyHealth Patient Portal offered by [facility] by registering at the following website: [email or web address]. By joining [employer]’s FollowMyHealth portal, you will also be able to view your health information using other applications (apps) compatible with our system.

## 2024-05-16 NOTE — DISCHARGE NOTE PROVIDER - PROVIDER TOKENS
PROVIDER:[TOKEN:[[id number]],FOLLOWUP:[1-3 days],ESTABLISHEDPATIENT:[T]],PROVIDER:[TOKEN:[[id number]],FOLLOWUP:[1 week],ESTABLISHEDPATIENT:[T]]

## 2024-05-16 NOTE — DISCHARGE NOTE PROVIDER - DETAILS OF MALNUTRITION DIAGNOSIS/DIAGNOSES
This patient has been assessed with a concern for Malnutrition and was treated during this hospitalization for the following Nutrition diagnosis/diagnoses:     -  05/15/2024: Severe protein-calorie malnutrition

## 2024-05-16 NOTE — DISCHARGE NOTE PROVIDER - NSDCFUADDAPPT_GEN_ALL_CORE_FT
APPTS ARE READY TO BE MADE: [x] YES    Best Family or Patient Contact (if needed):    Additional Information about above appointments (if needed):    1: Post hospital discharge follow up  2:   3:     Other comments or requests:

## 2024-05-16 NOTE — PROGRESS NOTE ADULT - SUBJECTIVE AND OBJECTIVE BOX
PCP:  Dr. Romaine Silva    CHIEF COMPLAINT:   abd pain    HISTORY OF THE PRESENT ILLNESS:  89yo F with h/o myelodysplastic syndrome, HTN, HLD, hypothyroidism, PML, cataracts, and macular degeneration presents to the ED with 2 weeks of worsening abd pain. The patient describes pain to the RUQ that has been increasing in intensity, associated with nausea, vomiting, and low appetite. Patient was seen for scheduled transfusion today and referred to ED. She denies hematemesis, dark stools, change in bowel habits, fevers, or chills. She notes h/o "heartburn" which she treats with OTC medication. No prior endoscopy.     Upon eval in the ER, patient had CT abd pelvis which revealed a perforated but contained perforation in the 1st portion of the duodenum.  Also noted was severe superior mesenteric artery stenosis.  Patient admitted by surgery.  NPO.  IVF.  pain control.      No abdominal pain. Denies any HA, CP, SOB. NO fevers, chills or shakes.       REVIEW OF SYSTEMS:  General: NAD, hemodynamically stable, + weakness  HEENT:  Eyes:  No visual loss, blurred vision, double vision or yellow sclerae. Ears, Nose, Throat:  No hearing loss, sneezing, congestion, runny nose or sore throat.  SKIN:  No rash or itching.  CARDIOVASCULAR:  No chest pain, chest pressure or chest discomfort. No palpitations or edema.  RESPIRATORY:  No shortness of breath, cough or sputum.  GASTROINTESTINAL:  No abdominal pain  NEUROLOGICAL:  No headache, dizziness, syncope, paralysis, ataxia, numbness or tingling in the extremities. No change in bowel or bladder control.  MUSCULOSKELETAL:  No muscle, back pain, joint pain or stiffness.  HEMATOLOGIC:  No anemia, bleeding or bruising.  LYMPHATICS:  No enlarged nodes. No history of splenectomy.  ENDOCRINOLOGIC:  No reports of sweating, cold or heat intolerance. No polyuria or polydipsia.  ALLERGIES:  No history of asthma, hives, eczema or rhinitis.    Physical Exam:   GENERAL APPEARANCE:  elderly, frail, deconditioned.  T(C): 37.2 (05-15-24 @ 07:53), Max: 37.6 (05-14-24 @ 21:48)  HR: 84 (05-15-24 @ 08:36) (82 - 95)  BP: 137/60 (05-15-24 @ 07:53) (128/54 - 167/51)  RR: 19 (05-15-24 @ 07:53) (18 - 20)  SpO2: 97% (05-15-24 @ 08:36) (93% - 99%)  HEENT:  Head is normocephalic    Skin:  thin and dry  NECK:  Supple without lymphadenopathy.   HEART:  Regular rate and rhythm. normal S1 and S2, No M/R/G  LUNGS:  Good ins/exp effort, no W/R/R/C  ABDOMEN:  Soft, nontender, nondistended with good bowel sounds heard  EXTREMITIES:  Without cyanosis, clubbing or edema.   NEUROLOGICAL:  Gross nonfocal       CBC Full  -  ( 15 May 2024 08:04 )  WBC Count : 7.31 K/uL  RBC Count : 2.46 M/uL  Hemoglobin : 6.9 g/dL  Hematocrit : 20.8 %  Platelet Count - Automated : 118 K/uL  Mean Cell Volume : 84.6 fl  Mean Cell Hemoglobin : 28.0 pg  Mean Cell Hemoglobin Concentration : 33.2 gm/dL  Auto Neutrophil # : 4.11 K/uL  Auto Lymphocyte # : 1.19 K/uL  Auto Monocyte # : 1.48 K/uL  Auto Eosinophil # : 0.15 K/uL  Auto Basophil # : 0.07 K/uL  Auto Neutrophil % : 55.8 %  Auto Lymphocyte % : 16.2 %  Auto Monocyte % : 20.1 %  Auto Eosinophil % : 2.0 %  Auto Basophil % : 1.0 %      Urinalysis Basic - ( 15 May 2024 08:04 )    Color: x / Appearance: x / SG: x / pH: x  Gluc: 234 mg/dL / Ketone: x  / Bili: x / Urobili: x   Blood: x / Protein: x / Nitrite: x   Leuk Esterase: x / RBC: x / WBC x   Sq Epi: x / Non Sq Epi: x / Bacteria: x      05-15    137  |  109<H>  |  30<H>  ----------------------------<  234<H>  3.8   |  22  |  0.94    Ca    7.0<L>      15 May 2024 08:04  Phos  3.5     05-15  Mg     1.8     05-15    TPro  5.5<L>  /  Alb  2.1<L>  /  TBili  0.5  /  DBili  x   /  AST  35  /  ALT  40  /  AlkPhos  69  05-15    # Contained Perforation of the first portion of the duodenum:    - transfer of care from surgery to medicine  - no surgical interventions at this time  - IV antibiotics  - pending further imaging as per surgery    # MDS with Anemia:    - low HH -  6s, s/p transfusion 1 unit of PRBCs  - AS per old notes, patient has MDS with 10% blasts and requires PRN transfusions.        # Abn CXR:    - No SOB/ hypoxia.    - Repeat CXR in am.      #HTN:    - Cont home losartan.    - Stop HCTZ while NPO on IVF.      #Hypothyroid:    - Cont synthroid.      #DVT Proph:  Lovenox as per surgery.    T/c stopping if hgb drops further.           
Patient seen and examined on routine rounds.   She denies nausea, vomiting, fever or chills  Overnight, found to have SOB with wheezes, given lV lasix and placed on BiPAP  VS reviewed    Physical Exam:  Pt is AAOx3  General: No acute distress.   Chest: On 6L INC, b/l crackles  Heart: RRR  Abdomen: Soft, nondistended, non tender to palpation   Back: Normal curvature, no tenderness.   Neuro: Physiological, no localizing findings.   Skin: Normal, no rashes, no lesions noted.   Extremities: Warm, well perfused, no edema.    Vital Signs Last 24 Hrs  T(C): 36.8 (15 May 2024 02:24), Max: 37.7 (14 May 2024 07:59)  T(F): 98.2 (15 May 2024 02:24), Max: 99.9 (14 May 2024 07:59)  HR: 95 (15 May 2024 02:24) (85 - 95)  BP: 128/54 (15 May 2024 02:44) (120/57 - 167/51)  BP(mean): --  RR: 20 (15 May 2024 02:24) (18 - 20)  SpO2: 99% (15 May 2024 02:24) (93% - 99%)    Parameters below as of 14 May 2024 21:48  Patient On (Oxygen Delivery Method): nasal cannula  O2 Flow (L/min): 3                          7.5    7.76  )-----------( 115      ( 14 May 2024 06:55 )             22.2     05-14    139  |  111<H>  |  46<H>  ----------------------------<  119<H>  4.0   |  23  |  0.91    Ca    7.5<L>      14 May 2024 06:55  Phos  2.2     05-14  Mg     1.8     05-14    TPro  6.2  /  Alb  2.5<L>  /  TBili  0.4  /  DBili  x   /  AST  16  /  ALT  30  /  AlkPhos  57  05-13  
Patient seen and examined on routine rounds.   She denies nausea, vomiting, fever or chills  Nurse report no acute event overnight   VS reviewed    Physical Exam:  Pt is AAOx3  General: No acute distress.   Chest: Non-labored respirations, symmetric chest rise  Heart: RRR  Abdomen: Soft, nondistended, tender to palpation of the RUQ and R flank  Back: Normal curvature, no tenderness.   Neuro: Physiological, no localizing findings.   Skin: Normal, no rashes, no lesions noted.   Extremities: Warm, well perfused, no edema.    Vital Signs Last 24 Hrs  T(C): 36.1 (13 May 2024 21:15), Max: 37.4 (13 May 2024 11:09)  T(F): 97 (13 May 2024 21:15), Max: 99.4 (13 May 2024 20:43)  HR: 75 (13 May 2024 21:15) (72 - 88)  BP: 136/49 (13 May 2024 21:15) (118/31 - 162/54)  BP(mean): 80 (13 May 2024 19:55) (80 - 80)  RR: 18 (13 May 2024 21:15) (16 - 19)  SpO2: 94% (13 May 2024 21:15) (94% - 98%)    Parameters below as of 13 May 2024 21:15  Patient On (Oxygen Delivery Method): nasal cannula  O2 Flow (L/min): 3                          8.5    11.20 )-----------( 152      ( 13 May 2024 14:13 )             25.4     05-13    129<L>  |  101  |  67<H>  ----------------------------<  186<H>  4.9   |  24  |  0.98    Ca    8.3<L>      13 May 2024 14:13    TPro  6.2  /  Alb  2.5<L>  /  TBili  0.4  /  DBili  x   /  AST  16  /  ALT  30  /  AlkPhos  57  05-13  
Patient seen, sitting upright  abd pain improving  received transfusion    MEDICATIONS  (STANDING):  ascorbic acid 500 milliGRAM(s) Oral daily  aspirin enteric coated 81 milliGRAM(s) Oral daily  atorvastatin 80 milliGRAM(s) Oral at bedtime  calcium carbonate    500 mG (Tums) Chewable 1 Tablet(s) Chew daily  cholecalciferol 1000 Unit(s) Oral daily  dextrose 10% Bolus 125 milliLiter(s) IV Bolus once  dextrose 5%. 1000 milliLiter(s) (100 mL/Hr) IV Continuous <Continuous>  dextrose 5%. 1000 milliLiter(s) (50 mL/Hr) IV Continuous <Continuous>  dextrose 50% Injectable 12.5 Gram(s) IV Push once  dextrose 50% Injectable 25 Gram(s) IV Push once  escitalopram 10 milliGRAM(s) Oral daily  fenofibrate Tablet 145 milliGRAM(s) Oral daily  glucagon  Injectable 1 milliGRAM(s) IntraMuscular once  heparin   Injectable 5000 Unit(s) SubCutaneous every 8 hours  insulin glargine Injectable (LANTUS) 6 Unit(s) SubCutaneous at bedtime  insulin lispro (ADMELOG) corrective regimen sliding scale   SubCutaneous at bedtime  insulin lispro (ADMELOG) corrective regimen sliding scale   SubCutaneous three times a day before meals  levothyroxine 50 MICROGram(s) Oral daily  losartan 100 milliGRAM(s) Oral daily  pantoprazole  Injectable 40 milliGRAM(s) IV Push two times a day  piperacillin/tazobactam IVPB.. 3.375 Gram(s) IV Intermittent every 8 hours  senna 1 Tablet(s) Oral daily    MEDICATIONS  (PRN):  acetaminophen   IVPB .. 700 milliGRAM(s) IV Intermittent every 8 hours PRN Moderate Pain (4 - 6)  acetaminophen   IVPB .. 725 milliGRAM(s) IV Intermittent every 6 hours PRN Mild Pain (1 - 3)  artificial  tears Solution 1 Drop(s) Both EYES daily PRN Dry Eyes  dextrose Oral Gel 15 Gram(s) Oral once PRN Blood Glucose LESS THAN 70 milliGRAM(s)/deciliter  morphine  - Injectable 2 milliGRAM(s) IV Push every 8 hours PRN Severe Pain (7 - 10)  ondansetron Injectable 4 milliGRAM(s) IV Push every 6 hours PRN Nausea and/or Vomiting      ROS  No fever, sweats, chills       Vital Signs Last 24 Hrs  T(C): 36.8 (16 May 2024 07:56), Max: 37.1 (15 May 2024 20:36)  T(F): 98.2 (16 May 2024 07:56), Max: 98.8 (15 May 2024 20:36)  HR: 85 (16 May 2024 10:15) (78 - 91)  BP: 137/55 (16 May 2024 10:15) (128/64 - 161/64)  BP(mean): --  RR: 19 (16 May 2024 07:56) (18 - 20)  SpO2: 99% (16 May 2024 07:56) (93% - 99%)    Parameters below as of 16 May 2024 07:56  Patient On (Oxygen Delivery Method): nasal cannula  O2 Flow (L/min): 3      PE  NAD  Awake, alert  Anicteric, MMM     No rash grossly  FROM                          7.8    6.66  )-----------( 116      ( 16 May 2024 06:48 )             23.8       05-16    139  |  110<H>  |  26<H>  ----------------------------<  125<H>  3.4<L>   |  24  |  0.91    Ca    6.8<L>      16 May 2024 06:48  Phos  2.2     05-16  Mg     1.9     05-16    TPro  5.4<L>  /  Alb  2.0<L>  /  TBili  0.4  /  DBili  x   /  AST  36  /  ALT  44  /  AlkPhos  68  05-16

## 2024-05-16 NOTE — PROGRESS NOTE ADULT - ASSESSMENT
89yo F with h/o myelodysplastic syndrome presents with contained perforation of the 1st segment of the duodenum. She notes pain is much improved with pain medication and she would like to avoid surgical intervention.     Plan  - NPO  - IVF  - IV abx  - serial abd exams  - monitor VS  - encourage OOB/IS use  - pain and nausea control PRN  - DVT and GI ppx    Discussed with Dr. Brown
88 year old F hx of breast ca 2009 s/p chemothreapy, MDS on retacrit, requiring multiple transfusions, presents with abdominal pain    1) Abdominal Pain  found to have contained perforated duodenal ulcer  surgery transferred to medicine service  on IV abx with improvement    2) Anemia  on retacrit 60 K weekly, can order if she will be in hospital last dose in office was on 5/7  transfuse for goal hgb > 7    f/u with Dr Kirk after dc    Thank you for the courtesy of this consultation and we will continue to follow.    Zoltan Viveros MD  [facility]  Cell: [phone]
89yo F with h/o myelodysplastic syndrome presents with contained perforation of the 1st segment of the duodenum.    #Fluid overload     Plan  - NPO  -Possible UGI studies today   -F/u Hospitalist recs   - IV abx  - serial abd exams  - monitor VS  - encourage OOB/IS use  - pain and nausea control PRN  - DVT and GI ppx  -Wean of oxygen as tolerated   Discussed with Dr. Brown

## 2024-05-16 NOTE — PROGRESS NOTE ADULT - REASON FOR ADMISSION
Perforated Duodenal ulcer

## 2024-05-16 NOTE — PHARMACOTHERAPY INTERVENTION NOTE - COMMENTS
Medication reconciliation completed.  Patient was unable to provide medication information, spoke to son/HCP Ulices ([phone]) and they provided current medication list; confirmed with Dr. First [facility]. 
88y female admitted with Nontraumatic perforation of intestine    HPI:  88 year old female with a PMHx of myelodysplastic syndrome, HTN, HLD, hypothyroidism, PML, cataracts, and macular degeneration presents RUQ abdominal pain x 2 weeks  associated with nausea, vomiting, and low appetite. She receives scheduled transfusions. (-) Hematemesis, dark stools, change in bowel habits, fevers, or chills. (13 May 2024 19:08)    Pertinent PMH/PSH  Anemia  Acute promyelocytic leukemia  HTN (hypertension)  HLD (hyperlipidemia)  Hypothyroidism  Macular degeneration  Cataract  H/O lumpectomy  Fracture of femur    Home Medications:  ascorbic acid 500 mg oral tablet: 1 tab(s) orally once a day (13 May 2024 23:27)  aspirin 81 mg oral tablet: 1 tab(s) orally once a day (13 May 2024 23:27)  Caltrate 600 mg oral tablet: 1 tab(s) orally once a day (13 May 2024 23:27)  cholecalciferol 25 mcg (1000 intl units) oral tablet: 1 tab(s) orally once a day (13 May 2024 23:27)  escitalopram 10 mg oral tablet: 1 tab(s) orally once a day (13 May 2024 23:27)  fenofibrate 160 mg oral tablet: 1 tab(s) orally once a day (13 May 2024 23:27)  hydroCHLOROthiazide 12.5 mg oral tablet: 1 tab(s) orally once a day (13 May 2024 23:27)  levothyroxine 50 mcg (0.05 mg) oral tablet: 1 tab(s) orally once a day (13 May 2024 23:27)  losartan 100 mg oral tablet: 1 tab(s) orally once a day (13 May 2024 23:27)  oxycodone-acetaminophen 5 mg-325 mg oral tablet: 1 tab(s) orally every 6 hours as needed for  moderate pain (13 May 2024 23:28)  PreserVision AREDS 2 oral capsule: 1 cap(s) orally once a day (13 May 2024 23:27)  Probiotic Formula oral capsule: 1 cap(s) orally once a day (13 May 2024 23:27)  Refresh ophthalmic solution: 1 drop(s) in each eye once a day as needed for (13 May 2024 23:27)  rosuvastatin 20 mg oral tablet: 1 tab(s) orally once a day (13 May 2024 23:27)    A1C Result(s) Within Prior 3 Months: Pending    Renal Function Within Prior 72 Hours  Creatinine: 0.98 mg/dL (05-13-24 @ 14:13)  Creatinine: 0.91 mg/dL (05-14-24 @ 06:55)  Creatinine: 0.94 mg/dL (05-15-24 @ 08:04)    Current Orders  insulin lispro (ADMELOG) corrective regimen sliding scale   SubCutaneous three times a day before meals  insulin lispro (ADMELOG) corrective regimen sliding scale   SubCutaneous at bedtime    POCT Within Prior 24 Hours  POCT Blood Glucose.: 310 mg/dL (05-15-24 @ 13:16)    Insulin Administration Within Prior 24 Hours  insulin lispro (ADMELOG) corrective regimen sliding scale   4 Unit(s) SubCutaneous (05-15-24 @ 13:17)    Other Administration(s) (i.e. Steroids, PO diabetes medications) Within Prior 24 Hours  methylPREDNISolone sodium succinate Injectable   40 milliGRAM(s) IV Push (05-15-24 @ 02:38)  piperacillin/tazobactam IVPB in 100 mL D5W   25 mL/Hr IV Intermittent (05-14-24 @ 16:46)   25 mL/Hr IV Intermittent (05-15-24 @ 05:38)   25 mL/Hr IV Intermittent (05-15-24 @ 13:11)    Height (cm): 147.3 (05-13-24 @ 13:58)  Weight (kg): 46.9 (05-13-24 @ 21:15)  BMI (kg/m2): 21.6 (05-13-24 @ 21:15)    Current Diet  Diet, Clear Liquid (05-15-24 @ 12:02) [Active]    Assessment/Plan:  - Patient with a history of Type 2 Diabetes Mellitus: Glycemic control to be managed by Inpatient Diabetes Management Team  - A1C is pending: Patients treatment goal is A1C < 7.5% due to age per the ADA standards, not currently on anti-hyperglycemic medications at home  - Patient is insulin naive, currently ordered a low intensity insulin correction scale Q6H  - Pre-lunch POCT 310: Patients glycemic control is currently above protocol range of POCT 100-180  - Patients diet advanced this afternoon from NPO since 05/13 to clear liquid diet: Discontinue D51/2NS IV fluids  - Patient received a 1x dose of methylprednisolone 40 mg IV overnight and continues to receive IV antibiotics in D5W [300 mL/day]  - Change insulin correction scale to TID AC and HS given diet advancement  - Continue low intensity insulin correction scale given frailty and discontinuation of prior agents likely resulting in hyperglycemia i.e. steroids and D5W IV fluids  - Titrate therapy to glycemic POCT target 100-180  - If patient continues to run hyperglycemic with POCT > 180: Consider modifying diet to include consistent carbohydrates
88y female admitted with Nontraumatic perforation of intestine    HPI:  88 year old female with a PMHx of myelodysplastic syndrome, HTN, HLD, hypothyroidism, PML, cataracts, and macular degeneration presents RUQ abdominal pain x 2 weeks  associated with nausea, vomiting, and low appetite. She receives scheduled transfusions. (-) Hematemesis, dark stools, change in bowel habits, fevers, or chills. (13 May 2024 19:08)    Pertinent PMH/PSH  Anemia  Acute promyelocytic leukemia  HTN (hypertension)  HLD (hyperlipidemia)  Hypothyroidism  Macular degeneration  Cataract  H/O lumpectomy  Fracture of femur    Home Medications:  ascorbic acid 500 mg oral tablet: 1 tab(s) orally once a day (13 May 2024 23:27)  aspirin 81 mg oral tablet: 1 tab(s) orally once a day (13 May 2024 23:27)  Caltrate 600 mg oral tablet: 1 tab(s) orally once a day (13 May 2024 23:27)  cholecalciferol 25 mcg (1000 intl units) oral tablet: 1 tab(s) orally once a day (13 May 2024 23:27)  escitalopram 10 mg oral tablet: 1 tab(s) orally once a day (13 May 2024 23:27)  fenofibrate 160 mg oral tablet: 1 tab(s) orally once a day (13 May 2024 23:27)  hydroCHLOROthiazide 12.5 mg oral tablet: 1 tab(s) orally once a day (13 May 2024 23:27)  levothyroxine 50 mcg (0.05 mg) oral tablet: 1 tab(s) orally once a day (13 May 2024 23:27)  losartan 100 mg oral tablet: 1 tab(s) orally once a day (13 May 2024 23:27)  oxycodone-acetaminophen 5 mg-325 mg oral tablet: 1 tab(s) orally every 6 hours as needed for  moderate pain (13 May 2024 23:28)  PreserVision AREDS 2 oral capsule: 1 cap(s) orally once a day (13 May 2024 23:27)  Probiotic Formula oral capsule: 1 cap(s) orally once a day (13 May 2024 23:27)  Refresh ophthalmic solution: 1 drop(s) in each eye once a day as needed for (13 May 2024 23:27)  rosuvastatin 20 mg oral tablet: 1 tab(s) orally once a day (13 May 2024 23:27)    A1C Result(s) Within Prior 3 Months: Pending    Renal Function Within Prior 72 Hours  Creatinine: 0.98 mg/dL (05-13-24 @ 14:13)  Creatinine: 0.91 mg/dL (05-14-24 @ 06:55)  Creatinine: 0.94 mg/dL (05-15-24 @ 08:04)  Creatinine: 0.91 mg/dL (05-16-24 @ 06:48)    Current Orders  insulin glargine Injectable (LANTUS) 6 Unit(s) SubCutaneous at bedtime  insulin lispro (ADMELOG) corrective regimen sliding scale   SubCutaneous at bedtime  insulin lispro (ADMELOG) corrective regimen sliding scale   SubCutaneous three times a day before meals    POCT Within Prior 24 Hours  POCT Blood Glucose.: 310 mg/dL (05-15-24 @ 13:16)  POCT Blood Glucose.: 104 mg/dL (05-15-24 @ 17:02)  POCT Blood Glucose.: 211 mg/dL (05-15-24 @ 22:26)  POCT Blood Glucose.: 214 mg/dL (05-16-24 @ 07:56)  POCT Blood Glucose.: 241 mg/dL (05-16-24 @ 11:54)    Insulin Administration Within Prior 24 Hours  insulin lispro (ADMELOG) corrective regimen sliding scale   4 Unit(s) SubCutaneous (05-15-24 @ 13:17)   2 Unit(s) SubCutaneous (05-15-24 @ 22:32)   2 Unit(s) SubCutaneous (05-16-24 @ 12:06)    Other Administration(s) (i.e. Steroids, PO diabetes medications) Within Prior 24 Hours  methylPREDNISolone sodium succinate Injectable   40 milliGRAM(s) IV Push (05-15-24 @ 02:38)  piperacillin/tazobactam IVPB in 100 mL D5W   25 mL/Hr IV Intermittent (05-15-24 @ 13:11)   25 mL/Hr IV Intermittent (05-16-24 @ 00:01)   25 mL/Hr IV Intermittent (05-16-24 @ 08:13)    Height (cm): 147.3 (05-15-24 @ 21:18)  Weight (kg): 46.9 (05-15-24 @ 21:18)  BMI (kg/m2): 21.6 (05-15-24 @ 21:18)    Current Diet  Diet, Consistent Carbohydrate Clear Liquid (05-16-24 @ 07:30) [Active]    Assessment/Plan:  - Patient with a history of Type 2 Diabetes Mellitus: Glycemic control to be managed by Inpatient Diabetes Management Team  - A1C is pending: Patients treatment goal is A1C < 7.5% due to age per the ADA standards, not currently on anti-hyperglycemic medications at home  - Patient is insulin naive, currently ordered a low intensity insulin correction scale TID AC and HS for POCT > 150  - Fasting POCT 214 and pre-lunch POCT 241: Patients glycemic control is currently above protocol range of POCT 100-180  - Prior day POCT trended NA, 310, 104, 211: Patient has required Admelog correction NA/4/0/2  - POCT >180 x 2 within 24 hours: Modified diet to include consistent carbohydrates this afternoon and started basal-bolus insulin regimen using 0.3 units/kg/day [Lantus 6 units HS and Admelog 2 units TID AC]  - Per MD will hold off pre-meal while patient remains on a clear liquid diet, continue Lantus 6 units HS  - Increase insulin correction scale to moderate intensity to titrate therapy to glycemic POCT target 100-180

## 2024-05-16 NOTE — DISCHARGE NOTE PROVIDER - CARE PROVIDER_API CALL
Benjy Silva  Internal Medicine  [address], [address]  [place] [address]  Phone: [phone]  Fax: [phone]  Established Patient  Follow Up Time: 1-3 days    Ed Brown  Surgery  [address], [address]  [place] [address]  Phone: [phone]  Fax: [phone]  Established Patient  Follow Up Time: 1 week

## 2024-05-16 NOTE — DISCHARGE NOTE PROVIDER - NSDCCPCAREPLAN_GEN_ALL_CORE_FT
PRINCIPAL DISCHARGE DIAGNOSIS  Diagnosis: Duodenal perforation  Assessment and Plan of Treatment: # Contained Perforation of the first portion of the duodenum:    - no surgical interventions at this time  - close follow up with Dr. Brown      SECONDARY DISCHARGE DIAGNOSES  Diagnosis: MDS (myelodysplastic syndrome)  Assessment and Plan of Treatment: - you required transfusion of 1 unit of PRBCs  - discharge HH - 7.8  - AS per old notes, patient has MDS with 10% blasts and requires PRN transfusions.  - close follow up with rosibel     PRINCIPAL DISCHARGE DIAGNOSIS  Diagnosis: Duodenal perforation  Assessment and Plan of Treatment: # Contained Perforation of the first portion of the duodenum:    - no surgical interventions at this time  - close follow up with Dr. Brown  - UGI: 1.4 cm duodenal bulb ulcer. No contrast leak.      SECONDARY DISCHARGE DIAGNOSES  Diagnosis: MDS (myelodysplastic syndrome)  Assessment and Plan of Treatment: - you required transfusion of 1 unit of PRBCs  - discharge HH - 7.8  - AS per old notes, patient has MDS with 10% blasts and requires PRN transfusions.  - close follow up with rosibel

## 2024-05-16 NOTE — DISCHARGE NOTE NURSING/CASE MANAGEMENT/SOCIAL WORK - NSDCVIVACCINE_GEN_ALL_CORE_FT
influenza, injectable, quadrivalent, preservative free; 08-Jan-2020 16:16; Milana Shook (MARK); [employer]; [id number] (Exp. Date: 30-Jun-2020); IntraMuscular; Deltoid Right.; 0.5 milliLiter(s); VIS (VIS Published: 15-Aug-2019, VIS Presented: 08-Jan-2020);

## 2024-05-16 NOTE — DISCHARGE NOTE PROVIDER - HOSPITAL COURSE
PCP:  Dr. Romaine Silva    CHIEF COMPLAINT:   abd pain    HISTORY OF THE PRESENT ILLNESS:  87yo F with h/o myelodysplastic syndrome, HTN, HLD, hypothyroidism, PML, cataracts, and macular degeneration presents to the ED with 2 weeks of worsening abd pain. The patient describes pain to the RUQ that has been increasing in intensity, associated with nausea, vomiting, and low appetite. Patient was seen for scheduled transfusion today and referred to ED. She denies hematemesis, dark stools, change in bowel habits, fevers, or chills. She notes h/o "heartburn" which she treats with OTC medication. No prior endoscopy.     Imaging studies reviewed and discussed with Dr. Brown and residents. Upper GI series performed.     Physical Exam:   GENERAL APPEARANCE:  NAD, hemodynamically stable  T(C): 36.8 (05-16-24 @ 07:56), Max: 37.1 (05-15-24 @ 20:36)  HR: 85 (05-16-24 @ 10:15) (78 - 91)  BP: 137/55 (05-16-24 @ 10:15) (128/64 - 161/64)  RR: 19 (05-16-24 @ 07:56) (18 - 20)  SpO2: 99% (05-16-24 @ 07:56) (93% - 99%)  HEENT:  Head is normocephalic    Skin:  Warm and dry without any rash   NECK:  Supple without lymphadenopathy.   HEART:  Regular rate and rhythm. normal S1 and S2, No M/R/G  LUNGS:  Good ins/exp effort, no W/R/R/C  ABDOMEN:  Soft, nontender, nondistended with good bowel sounds heard  EXTREMITIES:  Without cyanosis, clubbing or edema.   NEUROLOGICAL:  Gross nonfocal           # MDS with Anemia:    - low HH -  6s, s/p transfusion 1 unit of PRBCs  - AS per old notes, patient has MDS with 10% blasts and requires PRN transfusions.        # Abn CXR:    - No SOB/ hypoxia.    - Repeat CXR in am.      #HTN:    - Cont home losartan.    - Stop HCTZ while NPO on IVF.      #Hypothyroid:    - Cont synthroid.      #DVT Proph:  Lovenox as per surgery.    T/c stopping if hgb drops further.

## 2024-05-21 ENCOUNTER — INPATIENT (INPATIENT)
Facility: HOSPITAL | Age: 89
LOS: 1 days | Discharge: HOME HEALTH | DRG: 380 | End: 2024-05-23
Attending: SURGERY | Admitting: SURGERY
Payer: MEDICARE

## 2024-05-21 VITALS — HEIGHT: 58 IN

## 2024-05-21 DIAGNOSIS — Z98.890 OTHER SPECIFIED POSTPROCEDURAL STATES: Chronic | ICD-10-CM

## 2024-05-21 DIAGNOSIS — K26.9 DUODENAL ULCER, UNSPECIFIED AS ACUTE OR CHRONIC, WITHOUT HEMORRHAGE OR PERFORATION: ICD-10-CM

## 2024-05-21 DIAGNOSIS — S72.90XA UNSPECIFIED FRACTURE OF UNSPECIFIED FEMUR, INITIAL ENCOUNTER FOR CLOSED FRACTURE: Chronic | ICD-10-CM

## 2024-05-21 LAB
ALBUMIN SERPL ELPH-MCNC: 2.3 G/DL — LOW (ref 3.3–5)
ALP SERPL-CCNC: 85 U/L — SIGNIFICANT CHANGE UP (ref 40–120)
ALT FLD-CCNC: 40 U/L — SIGNIFICANT CHANGE UP (ref 12–78)
ANION GAP SERPL CALC-SCNC: 6 MMOL/L — SIGNIFICANT CHANGE UP (ref 5–17)
APPEARANCE UR: CLEAR — SIGNIFICANT CHANGE UP
APTT BLD: 35.2 SEC — SIGNIFICANT CHANGE UP (ref 24.5–35.6)
AST SERPL-CCNC: 22 U/L — SIGNIFICANT CHANGE UP (ref 15–37)
BASOPHILS # BLD AUTO: 0 K/UL — SIGNIFICANT CHANGE UP (ref 0–0.2)
BASOPHILS NFR BLD AUTO: 0 % — SIGNIFICANT CHANGE UP (ref 0–2)
BILIRUB SERPL-MCNC: 0.4 MG/DL — SIGNIFICANT CHANGE UP (ref 0.2–1.2)
BILIRUB UR-MCNC: NEGATIVE — SIGNIFICANT CHANGE UP
BUN SERPL-MCNC: 22 MG/DL — SIGNIFICANT CHANGE UP (ref 7–23)
CALCIUM SERPL-MCNC: 7.9 MG/DL — LOW (ref 8.5–10.1)
CHLORIDE SERPL-SCNC: 107 MMOL/L — SIGNIFICANT CHANGE UP (ref 96–108)
CO2 SERPL-SCNC: 24 MMOL/L — SIGNIFICANT CHANGE UP (ref 22–31)
COLOR SPEC: YELLOW — SIGNIFICANT CHANGE UP
CREAT SERPL-MCNC: 0.82 MG/DL — SIGNIFICANT CHANGE UP (ref 0.5–1.3)
DIFF PNL FLD: NEGATIVE — SIGNIFICANT CHANGE UP
EGFR: 70 ML/MIN/1.73M2 — SIGNIFICANT CHANGE UP
EOSINOPHIL # BLD AUTO: 0.6 K/UL — HIGH (ref 0–0.5)
EOSINOPHIL NFR BLD AUTO: 8 % — HIGH (ref 0–6)
GLUCOSE SERPL-MCNC: 143 MG/DL — HIGH (ref 70–99)
GLUCOSE UR QL: NEGATIVE MG/DL — SIGNIFICANT CHANGE UP
HCT VFR BLD CALC: 21.3 % — LOW (ref 34.5–45)
HGB BLD-MCNC: 6.9 G/DL — CRITICAL LOW (ref 11.5–15.5)
INR BLD: 1.24 RATIO — HIGH (ref 0.85–1.18)
KETONES UR-MCNC: NEGATIVE MG/DL — SIGNIFICANT CHANGE UP
LACTATE SERPL-SCNC: 1 MMOL/L — SIGNIFICANT CHANGE UP (ref 0.7–2)
LEUKOCYTE ESTERASE UR-ACNC: NEGATIVE — SIGNIFICANT CHANGE UP
LIDOCAIN IGE QN: 44 U/L — SIGNIFICANT CHANGE UP (ref 13–75)
LYMPHOCYTES # BLD AUTO: 1.34 K/UL — SIGNIFICANT CHANGE UP (ref 1–3.3)
LYMPHOCYTES # BLD AUTO: 18 % — SIGNIFICANT CHANGE UP (ref 13–44)
MCHC RBC-ENTMCNC: 27.9 PG — SIGNIFICANT CHANGE UP (ref 27–34)
MCHC RBC-ENTMCNC: 32.4 GM/DL — SIGNIFICANT CHANGE UP (ref 32–36)
MCV RBC AUTO: 86.2 FL — SIGNIFICANT CHANGE UP (ref 80–100)
MONOCYTES # BLD AUTO: 1.86 K/UL — HIGH (ref 0–0.9)
MONOCYTES NFR BLD AUTO: 25 % — HIGH (ref 2–14)
NEUTROPHILS # BLD AUTO: 3.27 K/UL — SIGNIFICANT CHANGE UP (ref 1.8–7.4)
NEUTROPHILS NFR BLD AUTO: 43 % — SIGNIFICANT CHANGE UP (ref 43–77)
NITRITE UR-MCNC: NEGATIVE — SIGNIFICANT CHANGE UP
NRBC # BLD: SIGNIFICANT CHANGE UP /100 WBCS (ref 0–0)
PH UR: 7.5 — SIGNIFICANT CHANGE UP (ref 5–8)
PLATELET # BLD AUTO: 109 K/UL — LOW (ref 150–400)
POTASSIUM SERPL-MCNC: 4.3 MMOL/L — SIGNIFICANT CHANGE UP (ref 3.5–5.3)
POTASSIUM SERPL-SCNC: 4.3 MMOL/L — SIGNIFICANT CHANGE UP (ref 3.5–5.3)
PROT SERPL-MCNC: 6 GM/DL — SIGNIFICANT CHANGE UP (ref 6–8.3)
PROT UR-MCNC: NEGATIVE MG/DL — SIGNIFICANT CHANGE UP
PROTHROM AB SERPL-ACNC: 13.9 SEC — HIGH (ref 9.5–13)
RBC # BLD: 2.47 M/UL — LOW (ref 3.8–5.2)
RBC # FLD: 14.8 % — HIGH (ref 10.3–14.5)
SODIUM SERPL-SCNC: 137 MMOL/L — SIGNIFICANT CHANGE UP (ref 135–145)
SP GR SPEC: 1.02 — SIGNIFICANT CHANGE UP (ref 1–1.03)
TROPONIN I, HIGH SENSITIVITY RESULT: 16.21 NG/L — SIGNIFICANT CHANGE UP
UROBILINOGEN FLD QL: 1 MG/DL — SIGNIFICANT CHANGE UP (ref 0.2–1)
WBC # BLD: 7.44 K/UL — SIGNIFICANT CHANGE UP (ref 3.8–10.5)
WBC # FLD AUTO: 7.44 K/UL — SIGNIFICANT CHANGE UP (ref 3.8–10.5)

## 2024-05-21 PROCEDURE — C9113: CPT

## 2024-05-21 PROCEDURE — 81003 URINALYSIS AUTO W/O SCOPE: CPT

## 2024-05-21 PROCEDURE — 86905 BLOOD TYPING RBC ANTIGENS: CPT

## 2024-05-21 PROCEDURE — 71045 X-RAY EXAM CHEST 1 VIEW: CPT | Mod: 26

## 2024-05-21 PROCEDURE — 86920 COMPATIBILITY TEST SPIN: CPT

## 2024-05-21 PROCEDURE — 74177 CT ABD & PELVIS W/CONTRAST: CPT | Mod: 26

## 2024-05-21 PROCEDURE — 85027 COMPLETE CBC AUTOMATED: CPT

## 2024-05-21 PROCEDURE — 87086 URINE CULTURE/COLONY COUNT: CPT

## 2024-05-21 PROCEDURE — 99285 EMERGENCY DEPT VISIT HI MDM: CPT | Mod: FS

## 2024-05-21 PROCEDURE — C1751: CPT

## 2024-05-21 PROCEDURE — 86922 COMPATIBILITY TEST ANTIGLOB: CPT

## 2024-05-21 PROCEDURE — 93010 ELECTROCARDIOGRAM REPORT: CPT

## 2024-05-21 PROCEDURE — 74177 CT ABD & PELVIS W/CONTRAST: CPT | Mod: MC

## 2024-05-21 PROCEDURE — 36415 COLL VENOUS BLD VENIPUNCTURE: CPT

## 2024-05-21 PROCEDURE — 84100 ASSAY OF PHOSPHORUS: CPT

## 2024-05-21 PROCEDURE — 85025 COMPLETE CBC W/AUTO DIFF WBC: CPT

## 2024-05-21 PROCEDURE — 36430 TRANSFUSION BLD/BLD COMPNT: CPT

## 2024-05-21 PROCEDURE — 80053 COMPREHEN METABOLIC PANEL: CPT

## 2024-05-21 PROCEDURE — 86921 COMPATIBILITY TEST INCUBATE: CPT

## 2024-05-21 PROCEDURE — 83735 ASSAY OF MAGNESIUM: CPT

## 2024-05-21 PROCEDURE — P9040: CPT

## 2024-05-21 RX ORDER — LEVOTHYROXINE SODIUM 125 MCG
1 TABLET ORAL
Qty: 0 | Refills: 0 | DISCHARGE

## 2024-05-21 RX ORDER — ONDANSETRON 8 MG/1
4 TABLET, FILM COATED ORAL EVERY 6 HOURS
Refills: 0 | Status: DISCONTINUED | OUTPATIENT
Start: 2024-05-21 | End: 2024-05-23

## 2024-05-21 RX ORDER — FLUCONAZOLE 150 MG/1
400 TABLET ORAL EVERY 24 HOURS
Refills: 0 | Status: COMPLETED | OUTPATIENT
Start: 2024-05-22 | End: 2024-05-22

## 2024-05-21 RX ORDER — HYDROCHLOROTHIAZIDE 25 MG
1 TABLET ORAL
Refills: 0 | DISCHARGE

## 2024-05-21 RX ORDER — ESCITALOPRAM OXALATE 10 MG/1
1 TABLET, FILM COATED ORAL
Refills: 0 | DISCHARGE

## 2024-05-21 RX ORDER — PANTOPRAZOLE SODIUM 20 MG/1
40 TABLET, DELAYED RELEASE ORAL DAILY
Refills: 0 | Status: DISCONTINUED | OUTPATIENT
Start: 2024-05-21 | End: 2024-05-21

## 2024-05-21 RX ORDER — PIPERACILLIN AND TAZOBACTAM 4; .5 G/20ML; G/20ML
3.38 INJECTION, POWDER, LYOPHILIZED, FOR SOLUTION INTRAVENOUS EVERY 8 HOURS
Refills: 0 | Status: DISCONTINUED | OUTPATIENT
Start: 2024-05-21 | End: 2024-05-22

## 2024-05-21 RX ORDER — HEPARIN SODIUM 5000 [USP'U]/ML
5000 INJECTION INTRAVENOUS; SUBCUTANEOUS EVERY 12 HOURS
Refills: 0 | Status: DISCONTINUED | OUTPATIENT
Start: 2024-05-21 | End: 2024-05-23

## 2024-05-21 RX ORDER — ACETAMINOPHEN 500 MG
1000 TABLET ORAL ONCE
Refills: 0 | Status: DISCONTINUED | OUTPATIENT
Start: 2024-05-21 | End: 2024-05-23

## 2024-05-21 RX ORDER — ROSUVASTATIN CALCIUM 5 MG/1
1 TABLET ORAL
Qty: 0 | Refills: 0 | DISCHARGE

## 2024-05-21 RX ORDER — CHOLECALCIFEROL (VITAMIN D3) 125 MCG
1 CAPSULE ORAL
Refills: 0 | DISCHARGE

## 2024-05-21 RX ORDER — FLUCONAZOLE 150 MG/1
800 TABLET ORAL ONCE
Refills: 0 | Status: DISCONTINUED | OUTPATIENT
Start: 2024-05-21 | End: 2024-05-21

## 2024-05-21 RX ORDER — DEXTROSE MONOHYDRATE, SODIUM CHLORIDE, AND POTASSIUM CHLORIDE 50; .745; 4.5 G/1000ML; G/1000ML; G/1000ML
1000 INJECTION, SOLUTION INTRAVENOUS
Refills: 0 | Status: DISCONTINUED | OUTPATIENT
Start: 2024-05-21 | End: 2024-05-22

## 2024-05-21 RX ORDER — CALCIUM CARBONATE 500(1250)
1 TABLET ORAL
Refills: 0 | DISCHARGE

## 2024-05-21 RX ORDER — PANTOPRAZOLE SODIUM 20 MG/1
40 TABLET, DELAYED RELEASE ORAL EVERY 12 HOURS
Refills: 0 | Status: DISCONTINUED | OUTPATIENT
Start: 2024-05-21 | End: 2024-05-23

## 2024-05-21 RX ORDER — ACETAMINOPHEN 500 MG
1000 TABLET ORAL ONCE
Refills: 0 | Status: COMPLETED | OUTPATIENT
Start: 2024-05-21 | End: 2024-05-21

## 2024-05-21 RX ORDER — ASCORBIC ACID 60 MG
1 TABLET,CHEWABLE ORAL
Refills: 0 | DISCHARGE

## 2024-05-21 RX ORDER — FLUCONAZOLE 150 MG/1
400 TABLET ORAL EVERY 24 HOURS
Refills: 0 | Status: DISCONTINUED | OUTPATIENT
Start: 2024-05-22 | End: 2024-05-22

## 2024-05-21 RX ORDER — FENOFIBRATE,MICRONIZED 130 MG
1 CAPSULE ORAL
Qty: 0 | Refills: 0 | DISCHARGE

## 2024-05-21 RX ORDER — FLUCONAZOLE 150 MG/1
400 TABLET ORAL ONCE
Refills: 0 | Status: COMPLETED | OUTPATIENT
Start: 2024-05-21 | End: 2024-05-21

## 2024-05-21 RX ORDER — ACETAMINOPHEN 500 MG
650 TABLET ORAL EVERY 6 HOURS
Refills: 0 | Status: DISCONTINUED | OUTPATIENT
Start: 2024-05-21 | End: 2024-05-23

## 2024-05-21 RX ORDER — LOSARTAN POTASSIUM 100 MG/1
1 TABLET, FILM COATED ORAL
Refills: 0 | DISCHARGE

## 2024-05-21 RX ORDER — PANTOPRAZOLE SODIUM 20 MG/1
80 TABLET, DELAYED RELEASE ORAL ONCE
Refills: 0 | Status: COMPLETED | OUTPATIENT
Start: 2024-05-21 | End: 2024-05-21

## 2024-05-21 RX ORDER — ASPIRIN/CALCIUM CARB/MAGNESIUM 324 MG
1 TABLET ORAL
Qty: 0 | Refills: 0 | DISCHARGE

## 2024-05-21 RX ORDER — L.ACIDOPH/B.ANIMALIS/B.LONGUM 15B CELL
1 CAPSULE ORAL
Refills: 0 | DISCHARGE

## 2024-05-21 RX ORDER — PIPERACILLIN AND TAZOBACTAM 4; .5 G/20ML; G/20ML
3.38 INJECTION, POWDER, LYOPHILIZED, FOR SOLUTION INTRAVENOUS ONCE
Refills: 0 | Status: COMPLETED | OUTPATIENT
Start: 2024-05-21 | End: 2024-05-21

## 2024-05-21 RX ORDER — OXYCODONE AND ACETAMINOPHEN 5; 325 MG/1; MG/1
1 TABLET ORAL
Refills: 0 | DISCHARGE

## 2024-05-21 RX ORDER — MULTIVIT-MIN/FERROUS GLUCONATE 9 MG/15 ML
1 LIQUID (ML) ORAL
Refills: 0 | DISCHARGE

## 2024-05-21 RX ADMIN — FLUCONAZOLE 100 MILLIGRAM(S): 150 TABLET ORAL at 23:30

## 2024-05-21 RX ADMIN — PANTOPRAZOLE SODIUM 40 MILLIGRAM(S): 20 TABLET, DELAYED RELEASE ORAL at 22:05

## 2024-05-21 RX ADMIN — PIPERACILLIN AND TAZOBACTAM 200 GRAM(S): 4; .5 INJECTION, POWDER, LYOPHILIZED, FOR SOLUTION INTRAVENOUS at 17:24

## 2024-05-21 RX ADMIN — Medication 400 MILLIGRAM(S): at 17:20

## 2024-05-21 RX ADMIN — PANTOPRAZOLE SODIUM 80 MILLIGRAM(S): 20 TABLET, DELAYED RELEASE ORAL at 17:23

## 2024-05-21 RX ADMIN — Medication 650 MILLIGRAM(S): at 20:23

## 2024-05-21 RX ADMIN — PIPERACILLIN AND TAZOBACTAM 25 GRAM(S): 4; .5 INJECTION, POWDER, LYOPHILIZED, FOR SOLUTION INTRAVENOUS at 22:05

## 2024-05-21 RX ADMIN — HEPARIN SODIUM 5000 UNIT(S): 5000 INJECTION INTRAVENOUS; SUBCUTANEOUS at 22:05

## 2024-05-21 NOTE — PATIENT PROFILE ADULT - FUNCTIONAL ASSESSMENT - BASIC MOBILITY 6.
[facility]    Progress Note    Michelle Jordan Patient Status:  Inpatient    11/3/1940 MRN [id number]   Location [facility] 3W/SW Attending Rosalinda Multani MD   Hosp Day # 3 PCP Jamil Barragan MD       Subjective:    Kayy Scales The status of the patient/edema and/or CHF. 2.  Right upper lobe pulmonary opacity is redemonstrated, appearing larger/denser since prior exam.  This is concerning for region of prominent edema versus pneumonia.     Dictated by (CST): Marysol Posey MD on 8/06/20 stress test shows a fixed defect- stable per cards. - Troponin neg.   - no further CP now. - increase coreg to 25mg BID.      EVELIN  - creatinine doubled  - hold lasix   - gentle IV fluids 0.9 NS 60 cc/hour  - bmp tomorrow     Afib Paroxysmal  - continue  3-calculated by average/Not able to assess (calculate score using [facility] averaging method)

## 2024-05-21 NOTE — ED PROVIDER NOTE - ATTENDING APP SHARED VISIT CONTRIBUTION OF CARE
I, Natalie Gillespie DO, personally saw the patient with ACP.  I have personally performed a face to face diagnostic evaluation on this patient.  I have reviewed the ACP note and agree with the history, exam, and plan of care, except as noted.  I personally saw the patient and performed a substantive portion of the visit including all aspects of the medical decision making.

## 2024-05-21 NOTE — ED PROVIDER NOTE - NS ED ROS FT
GEN: Denies fever, chills, sick contacts, recent travel  HEENT: Denies dizziness, blurry vision, HA  Cardiac: Denies CP, SOB, palpitations  Lungs: Denies cough, wheezing  PV: Denies LE swelling  GI: +abdominal pain. Denies nausea, vomiting, diarrhea, constipation, flank pain  : Denies hematuria, dysuria, frequency, urgency  Skin: Denies rash, redness, open wound  MSK: Denies pain, decreased ROM  Neuro: Denies dizziness, difficulty speaking, difficulty swallowing, numbness/tingling in extremities, loss of bowel/bladder control, weakness in extremities

## 2024-05-21 NOTE — ED ADULT NURSE NOTE - CHIEF COMPLAINT QUOTE
Ambulatory to ER from Dr. Otero office with c/o R sided abdominal pain. Patient d/c from [place] on 05/16 for perforated duodenum, patient was not d/c with any oral antibiotics, Tylenol taken with no relief.

## 2024-05-21 NOTE — ED PROVIDER NOTE - OBJECTIVE STATEMENT
89 y/o F with PMH of breast CA, MDS, recent admission for perforated duodenal ulcer, HTN, HLD, hypothyroid presents with right sided abdominal pain. Pt's son at bedside. States patient was dc'd 5 days ago following admission for perforated duodenal ulcer. Had 2 days of relief of symptoms, but they have been worsening since that time. pt states she had black stools until yesterday. Was seen in office this AM by Dr. Brown, was advised to return to the hospital for likely readmission as per son as patient requires additional antibiotics which were nor prescribed at discharge. Pt denies fever, chills, nausea, vomiting, diarrhea, CP, SOb. of note, patient states last transfusion was during her last admission.

## 2024-05-21 NOTE — ED ADULT NURSE NOTE - NSFALLHARMRISKINTERV_ED_ALL_ED
Assistance with ambulation/Communicate risk of Fall with Harm to all staff, patient, and family/Provide visual cue: red socks, yellow wristband, yellow gown, etc/Reinforce activity limits and safety measures with patient and family/Bed in lowest position, wheels locked, appropriate side rails in place/Call bell, personal items and telephone in reach/Instruct patient to call for assistance before getting out of bed/chair/stretcher/Non-slip footwear applied when patient is off stretcher/[place] to call system/Physically safe environment - no spills, clutter or unnecessary equipment/Purposeful Proactive Rounding/Room/bathroom lighting operational, light cord in reach

## 2024-05-21 NOTE — ED ADULT TRIAGE NOTE - NS ED NURSE BANDS TYPE
Prostate Biopsy: About This Test 
What is it? A prostate biopsy is a type of test. Your doctor takes small tissue samples from your prostate gland. Then another doctor looks at the tissue under a microscope to see if there are cancer cells. This test is done by a doctor who specializes in men's genital and urinary problems (urologist). It can be done in your doctor's office, a day surgery clinic, or a hospital operating room. To get the tissue samples from the prostate, the doctor inserts a thin needle through the rectum, the urethra, or the area between the anus and scrotum (perineum). The most common method is through the rectum. Your doctor may use ultrasound to help guide the needle. Why is this test done? You may need a prostate biopsy if your doctor found something of concern during a digital rectal exam. Or you may need it if a blood test showed a high level of prostate-specific antigen (PSA). A biopsy can help find out if you have prostate cancer. How can you prepare for this test? 
Before you have a prostate biopsy, tell your doctor if you are taking any medicines or using any herbal supplements, or if you are allergic to any medicines. And tell your doctor if you have had bleeding problems, or you take aspirin or some other blood thinner. What happens before the test? 
· You may need to have an enema before the test. 
· You will need to take off all or most of your clothes. You will be given a cloth or paper gown to use during the test. 
· You may be given a sedative through a vein (IV) in your arm. The sedative will help you relax and stay still. What happens during the test? 
Some men have an MRI of the prostate before their biopsy. This helps to find the areas in the prostate to take biopsy samples. If you have an MRI, your doctor will use ultrasound and the MRI results to find the areas to biopsy. Through the rectum · You may be asked to kneel, lie on your side, or lie on your back. · Your doctor may inject an anesthetic around the prostate to numb the area before the sample is taken. · Ultrasound is often used to guide the needle to the correct spot. · Your doctor may choose to use a needle guide for the biopsy. He or she will attach the guide to a finger. Your doctor will insert the finger into your rectum. · The needle will enter the prostate and take 6 to 12 samples. Through the urethra · You will lie on your back. Your feet will rest in stirrups. · You will get anesthesia. The anesthesia may make you sleep. Or it may just numb the area being worked on. 
· Your doctor will insert a lighted scope (cystoscope) into your urethra. The scope allows your doctor to look directly at the prostate. Your doctor will pass a cutting loop through the scope to remove samples of prostate tissue. Through the perineum · You will lie on an exam table either on your side or on your back with your knees bent. You will get anesthesia. The anesthesia may make you sleep. Or it may just numb the area being worked on. 
· Your doctor will make a small cut in your perineum. Then he or she will insert a finger into the rectum to hold the prostate. · Your doctor will then insert the needle through the cut and into the prostate. · The needle collects samples of tissue and is then pulled out. What else should you know about this test? 
· A prostate biopsy has a slight risk of causing problems such as infection or bleeding. · If the biopsy went through your rectum, you may have a small amount of bleeding from your rectum for 2 to 3 days after the biopsy. · You may have a little pain in your pelvic area. You may also have a little blood in your urine for 1 to 5 days. · You may have some blood in your semen for a week or longer. How long will the test take? This test will take about 15 to 45 minutes.  
What happens after the test? 
Your doctor will tell you what to expect and watch for after your test. In general: · If you have anesthesia that makes you sleep, you will be in a recovery room for a few hours. You will need someone to drive you home. You may feel tired for the rest of the day. · You will probably be able to go home right away. · If your doctor had you stop taking any medicine for the biopsy, ask him or her when you can start taking it again. If you were given antibiotics, take them as directed. · Do not do heavy work or exercise for 4 hours after the test. 
· Your doctor will tell you how long it may take to get your results back. Follow-up care is a key part of your treatment and safety. Be sure to make and go to all appointments, and call your doctor if you are having problems. It's also a good idea to keep a list of the medicines you take. Ask your doctor when you can expect to have your test results. Where can you learn more? Go to http://nataly-rafael.info/. Enter Y504 in the search box to learn more about \"Prostate Biopsy: About This Test.\" Current as of: March 27, 2018 Content Version: 11.9 © 7658-2677 [employer]. Care instructions adapted under license by [employer] (which disclaims liability or warranty for this information). If you have questions about a medical condition or this instruction, always ask your healthcare professional. [address] any warranty or liability for your use of this information. Name band;

## 2024-05-21 NOTE — PATIENT PROFILE ADULT - FALL HARM RISK - HARM RISK INTERVENTIONS
Assistance with ambulation/Assistance OOB with selected safe patient handling equipment/Communicate Risk of Fall with Harm to all staff/Discuss with provider need for PT consult/Monitor for mental status changes/Monitor gait and stability/Provide patient with walking aids - walker, cane, crutches/Reinforce activity limits and safety measures with patient and family/Tailored Fall Risk Interventions/Use of alarms - bed, chair and/or voice tab/Visual Cue: Yellow wristband and red socks/Bed in lowest position, wheels locked, appropriate side rails in place/Call bell, personal items and telephone in reach/Instruct patient to call for assistance before getting out of bed or chair/Non-slip footwear when patient is out of bed/[place] to call system/Physically safe environment - no spills, clutter or unnecessary equipment/Purposeful Proactive Rounding/Room/bathroom lighting operational, light cord in reach

## 2024-05-21 NOTE — ED PROVIDER NOTE - PHYSICAL EXAMINATION
Gen: Well appearing. A&O x3.   HEENT: NC/AT. Dentition in good repair. No oropharyngeal erythema, tonsilar enlargement or exudates. Uvula midline. No submandibular or anterior cervical lymphadenopathy. TM well visualized bilaterally. Nares patent.  Cardiac: s1s2, RRR.  Lungs: CTAB, no chest wall tenderness.  Abdomen: NBS x4, soft, +RUQ tenderness  MSK: No obvious deformity to extremities.   Neuro: CN II-XII intact. Sensation intact to light touch in all extremities. 5/5 strength in all extremities.   PV: No LE edema or calf tenderness. Distal pulses 2+ in all extremities.

## 2024-05-21 NOTE — PHARMACOTHERAPY INTERVENTION NOTE - COMMENTS
Medication reconciliation completed.  Reviewed Medication list and confirmed med allergies with patient; confirmed with Dr. First Medraúl.

## 2024-05-21 NOTE — ED ADULT TRIAGE NOTE - CHIEF COMPLAINT QUOTE
Ambulatory to ER from Dr. Otero office with c/o R sided abdominal pain. Patient d/c from [place] on 05/16 for perforated duodenum, patient was not d/c with any oral antibiotics, Tylenol taken with no relief.

## 2024-05-21 NOTE — ED ADULT TRIAGE NOTE - AS HEIGHT TYPE
AFP labs drawn in office from right arm. Pt tolerated well.   Has not heard from [facility] regarding consult.  We will follow-up.  
Patient scheduled with MFM on 7/15/2022.     
stated

## 2024-05-21 NOTE — ED PROVIDER NOTE - CLINICAL SUMMARY MEDICAL DECISION MAKING FREE TEXT BOX
88 F with PMH of breast CA, MDS, recent admission and dc on 5/16 for perforated duodenal ulcer presents with right sided abdominal pain. Sent to ED by Dr. Brown for likely readmission. Will check labs, CT, start protonix, zosyn.

## 2024-05-21 NOTE — ED ADULT NURSE NOTE - OBJECTIVE STATEMENT
Pt presents to ED w/ right upper quadrant pain and epigastric pain, worsening since her discharge at  5 days ago. Pt was d/[facility] for perforated duodenal ulcer. Pt endorses dark stools. Pt denies fever, chills, nausea, vomiting. States she was not sent home on IV abx.

## 2024-05-21 NOTE — ED PROVIDER NOTE - PROGRESS NOTE DETAILS
Natalie Gillespie,  Attending Physician:   + contained duodenal perforation with inflammation. On abx. will admit Natalie Gillespie DO Attending Physician:   89 y/o F with PMH of breast CA, MDS, recent admission for perforated duodenal ulcer, HTN, HLD, hypothyroid presents to ED with right sided abdominal pain. As per son, patient was discharged 5 days ago following admission for perforated duodenal ulcer. Had 2 days of relief of symptoms, gradually sx got worse. pt states she had black stools until yesterday. Was seen in office this AM by Dr. Brown, was advised to return to the hospital for likely readmission and IV antibiotics which were not prescribed at discharge. denies headache, fever, chills, nausea, vomiting, diarrhea, difficulty breathing, chest pain.     PE:  Constitutional: NAD AAOx3  Eyes: PERRLA EOMI  Head: Normocephalic atraumatic  Mouth: MMM  Cardiac: regular rate , no murmur  Resp: unlabored breathing, no rhonchi  GI: Abd + RUQ ttp, soft/nd, no rebound or guarding  Neuro: grossly normal and intact  Skin: No visible rashes    A/P: 89 y/o F with PMH of breast CA, MDS, recent admission for perforated duodenal ulcer, HTN, HLD, hypothyroid presents to ED with right sided abdominal pain. As per son, patient was discharged 5 days ago following admission for perforated duodenal ulcer here for worsening abd pain. sent by Dr. Brown for IV abx and admission. will send labs, imaging, Plan to start abx, protonix and admission. Natalie Gillespie DO Attending Physician:   89 y/o F with PMH of breast CA, MDS, recent admission for perforated duodenal ulcer, HTN, HLD, hypothyroid presents to ED with right sided abdominal pain. As per son, patient was discharged 5 days ago following admission for perforated duodenal ulcer. Had 2 days of relief of symptoms, gradually sx got worse. pt states she had black stools until yesterday. Was seen in office this AM by Dr. Brown, was advised to return to the hospital for likely readmission and IV antibiotics which were not prescribed at discharge. denies headache, fever, chills, nausea, vomiting, diarrhea, difficulty breathing, chest pain.     PE:  Constitutional: NAD AAOx3  Eyes: PERRLA EOMI  Head: Normocephalic atraumatic  Mouth: MMM  Cardiac: regular rate , no murmur  Resp: unlabored breathing, no rhonchi  GI: Abd + RUQ ttp, soft/nd, no rebound or guarding  Neuro: grossly normal and intact  Skin: No visible rashes    A/P: 89 y/o F with PMH of breast CA, MDS, recent admission for perforated duodenal ulcer, HTN, HLD, hypothyroid presents to ED with right sided abdominal pain. As per son, patient was discharged 5 days ago following admission for perforated duodenal ulcer here for worsening abd pain. sent by Dr. Brown for IV abx and admission. concern for worsening perforation. will send labs, imaging, Plan to start abx, protonix and admission.

## 2024-05-22 LAB
HCT VFR BLD CALC: 24 % — LOW (ref 34.5–45)
HGB BLD-MCNC: 8.1 G/DL — LOW (ref 11.5–15.5)
MCHC RBC-ENTMCNC: 28.7 PG — SIGNIFICANT CHANGE UP (ref 27–34)
MCHC RBC-ENTMCNC: 33.8 GM/DL — SIGNIFICANT CHANGE UP (ref 32–36)
MCV RBC AUTO: 85.1 FL — SIGNIFICANT CHANGE UP (ref 80–100)
PLATELET # BLD AUTO: 89 K/UL — LOW (ref 150–400)
RBC # BLD: 2.82 M/UL — LOW (ref 3.8–5.2)
RBC # FLD: 14.7 % — HIGH (ref 10.3–14.5)
WBC # BLD: 8.4 K/UL — SIGNIFICANT CHANGE UP (ref 3.8–10.5)
WBC # FLD AUTO: 8.4 K/UL — SIGNIFICANT CHANGE UP (ref 3.8–10.5)

## 2024-05-22 PROCEDURE — 99222 1ST HOSP IP/OBS MODERATE 55: CPT

## 2024-05-22 RX ORDER — LOSARTAN POTASSIUM 100 MG/1
100 TABLET, FILM COATED ORAL DAILY
Refills: 0 | Status: DISCONTINUED | OUTPATIENT
Start: 2024-05-22 | End: 2024-05-23

## 2024-05-22 RX ORDER — FLUCONAZOLE 150 MG/1
100 TABLET ORAL DAILY
Refills: 0 | Status: DISCONTINUED | OUTPATIENT
Start: 2024-05-22 | End: 2024-05-23

## 2024-05-22 RX ORDER — DEXTROSE MONOHYDRATE, SODIUM CHLORIDE, AND POTASSIUM CHLORIDE 50; .745; 4.5 G/1000ML; G/1000ML; G/1000ML
1000 INJECTION, SOLUTION INTRAVENOUS
Refills: 0 | Status: DISCONTINUED | OUTPATIENT
Start: 2024-05-22 | End: 2024-05-23

## 2024-05-22 RX ORDER — HYDROMORPHONE HYDROCHLORIDE 2 MG/ML
0.5 INJECTION INTRAMUSCULAR; INTRAVENOUS; SUBCUTANEOUS EVERY 4 HOURS
Refills: 0 | Status: DISCONTINUED | OUTPATIENT
Start: 2024-05-22 | End: 2024-05-23

## 2024-05-22 RX ORDER — LEVOTHYROXINE SODIUM 125 MCG
50 TABLET ORAL DAILY
Refills: 0 | Status: DISCONTINUED | OUTPATIENT
Start: 2024-05-22 | End: 2024-05-23

## 2024-05-22 RX ORDER — ESCITALOPRAM OXALATE 10 MG/1
10 TABLET, FILM COATED ORAL DAILY
Refills: 0 | Status: DISCONTINUED | OUTPATIENT
Start: 2024-05-22 | End: 2024-05-23

## 2024-05-22 RX ORDER — ERTAPENEM SODIUM 1 G/1
1000 INJECTION, POWDER, LYOPHILIZED, FOR SOLUTION INTRAMUSCULAR; INTRAVENOUS EVERY 24 HOURS
Refills: 0 | Status: DISCONTINUED | OUTPATIENT
Start: 2024-05-22 | End: 2024-05-23

## 2024-05-22 RX ORDER — ASPIRIN/CALCIUM CARB/MAGNESIUM 324 MG
81 TABLET ORAL DAILY
Refills: 0 | Status: DISCONTINUED | OUTPATIENT
Start: 2024-05-22 | End: 2024-05-23

## 2024-05-22 RX ORDER — MORPHINE SULFATE 50 MG/1
2 CAPSULE, EXTENDED RELEASE ORAL EVERY 4 HOURS
Refills: 0 | Status: DISCONTINUED | OUTPATIENT
Start: 2024-05-22 | End: 2024-05-23

## 2024-05-22 RX ORDER — ATORVASTATIN CALCIUM 80 MG/1
20 TABLET, FILM COATED ORAL AT BEDTIME
Refills: 0 | Status: DISCONTINUED | OUTPATIENT
Start: 2024-05-22 | End: 2024-05-23

## 2024-05-22 RX ADMIN — HEPARIN SODIUM 5000 UNIT(S): 5000 INJECTION INTRAVENOUS; SUBCUTANEOUS at 08:40

## 2024-05-22 RX ADMIN — PIPERACILLIN AND TAZOBACTAM 25 GRAM(S): 4; .5 INJECTION, POWDER, LYOPHILIZED, FOR SOLUTION INTRAVENOUS at 06:42

## 2024-05-22 RX ADMIN — PANTOPRAZOLE SODIUM 40 MILLIGRAM(S): 20 TABLET, DELAYED RELEASE ORAL at 08:40

## 2024-05-22 RX ADMIN — HYDROMORPHONE HYDROCHLORIDE 0.5 MILLIGRAM(S): 2 INJECTION INTRAMUSCULAR; INTRAVENOUS; SUBCUTANEOUS at 01:21

## 2024-05-22 RX ADMIN — HEPARIN SODIUM 5000 UNIT(S): 5000 INJECTION INTRAVENOUS; SUBCUTANEOUS at 22:23

## 2024-05-22 RX ADMIN — Medication 650 MILLIGRAM(S): at 06:00

## 2024-05-22 RX ADMIN — Medication 50 MICROGRAM(S): at 06:00

## 2024-05-22 RX ADMIN — DEXTROSE MONOHYDRATE, SODIUM CHLORIDE, AND POTASSIUM CHLORIDE 100 MILLILITER(S): 50; .745; 4.5 INJECTION, SOLUTION INTRAVENOUS at 05:50

## 2024-05-22 RX ADMIN — HYDROMORPHONE HYDROCHLORIDE 0.5 MILLIGRAM(S): 2 INJECTION INTRAMUSCULAR; INTRAVENOUS; SUBCUTANEOUS at 00:51

## 2024-05-22 RX ADMIN — ATORVASTATIN CALCIUM 20 MILLIGRAM(S): 80 TABLET, FILM COATED ORAL at 22:25

## 2024-05-22 RX ADMIN — Medication 650 MILLIGRAM(S): at 06:30

## 2024-05-22 RX ADMIN — PANTOPRAZOLE SODIUM 40 MILLIGRAM(S): 20 TABLET, DELAYED RELEASE ORAL at 22:23

## 2024-05-22 RX ADMIN — ERTAPENEM SODIUM 120 MILLIGRAM(S): 1 INJECTION, POWDER, LYOPHILIZED, FOR SOLUTION INTRAMUSCULAR; INTRAVENOUS at 13:48

## 2024-05-22 RX ADMIN — Medication 81 MILLIGRAM(S): at 08:42

## 2024-05-22 RX ADMIN — Medication 650 MILLIGRAM(S): at 19:49

## 2024-05-22 RX ADMIN — ESCITALOPRAM OXALATE 10 MILLIGRAM(S): 10 TABLET, FILM COATED ORAL at 08:40

## 2024-05-22 RX ADMIN — LOSARTAN POTASSIUM 100 MILLIGRAM(S): 100 TABLET, FILM COATED ORAL at 08:41

## 2024-05-22 RX ADMIN — Medication 650 MILLIGRAM(S): at 20:19

## 2024-05-22 NOTE — H&P ADULT - HISTORY OF PRESENT ILLNESS
87 y/o F with PMH of breast CA, MDS, recent admission for perforated duodenal ulcer, HTN, HLD, hypothyroid presents with right sided abdominal pain. Pt's son at bedside. States patient was dc'd 5 days ago following admission for perforated duodenal ulcer. Had 2 days of relief of symptoms, but they have been worsening since that time. pt states she had black stools until yesterday. Was seen in office this AM by Dr. Brown, was advised to return to the hospital for likely readmission as per son as patient requires additional antibiotics which were nor prescribed at discharge. Pt denies fever, chills, nausea, vomiting, diarrhea, CP, SOb. of note, patient states last transfusion was during her last admission.

## 2024-05-22 NOTE — CONSULT NOTE ADULT - SUBJECTIVE AND OBJECTIVE BOX
88F with PMH of recent admission for duodenal ulcer, breast ca, MDS, HTN, HLD and Hypothyroidism presents with increasing/worsening right abdominal pain. Evaluated by surgeon and advised to report to the ER and found to have duodenal perforation.  88F with PMH of recent admission for duodenal ulcer, breast ca, MDS, HTN, HLD and Hypothyroidism presents with increasing/worsening right abdominal pain. Evaluated by surgeon and advised to report to the ER and found to have duodenal perforation. On presentation reporting:    In the ER Vitals stable. CBC remarkable for Hgb 6.9->s/p transfusion with repeat Hgb 8.1, Plt 89, CMP unremarkable. Troponin negative. CT reporting:  < from: CT Abdomen and Pelvis w/ Oral Cont and w/ IV Cont (24 @ 18:36) >  IMPRESSION:  Redemonstration of a contained perforation of the first portion of the   duodenum with surrounding inflammatory changes, similar in appearance to   2024. No pneumoperitoneum or extraluminal oral contrast seen.    < end of copied text >"    Review of Systems: 14 Point review of systems reviewed and reported as negative unless otherwise stated above    Acute promyelocytic leukemia     Anemia     Cataract left eye    HLD (hyperlipidemia)     HTN (hypertension)     Hypothyroidism     Macular degeneration right.     PAST SURGICAL HISTORY:  Fracture of femur right ac right    H/O lumpectomy Left side in , performed by Dr. Samuels at .     FAMILY HISTORY:  Family history of cancer in brother, Colon cancer  Patient's father is , Hx of spine cancer  Patient's mother is , Hx of breast and bone cancer.    Social Hx: Denies tobacco/smoking, EtOH or illicit drug use      PHYSICAL EXAM:    T(C): 36.6 (24 @ 08:16), Max: 37.4 (24 @ 16:10)  HR: 76 (24 @ 08:16) (43 - 88)  BP: 139/60 (24 @ 08:16) (125/61 - 150/75)  RR: 18 (24 @ 08:16) (16 - 20)  SpO2: 91% (24 @ 08:16) (91% - 100%)    General: AAOx3; NAD  Head: AT/NC  ENT: Moist Mucous Membranes; No Injury  Eyes: EOMI; PERRL  Neck: Non-tender; No JVD  CVS: RRR, S1&S2, No murmur, No edema  Respiratory: Lungs CTA B/L; Normal Respiratory Effort  Abdomen/GI: Soft, non-tender, non-distended, no guarding, no rebound, normal bowel sounds  : No bladder distention, No Narayanan  Extremities: No cyanosis, No clubbing, No edema  MSK: No CVA tenderness, Normal ROM, No injury  Neuro: AAOx3, CNII-XII grossly intact, non-focal  Psych: Appropriate, Cooperative, No depression, No anxiety  Skin: Clean, Dry and Intact                          8.1    8.40  )-----------( 89       ( 22 May 2024 00:31 )             24.0         137  |  107  |  22  ----------------------------<  143<H>  4.3   |  24  |  0.82    Ca    7.9<L>      21 May 2024 17:06    TPro  6.0  /  Alb  2.3<L>  /  TBili  0.4  /  DBili  x   /  AST  22  /  ALT  40  /  AlkPhos  85        CAPILLARY BLOOD GLUCOSE        Urinalysis (24 @ 19:00)   pH Urine: 7.5  Glucose Qualitative, Urine: Negative mg/dL  Blood, Urine: Negative  Color: Yellow  Urine Appearance: Clear  Bilirubin: Negative  Ketone - Urine: Negative mg/dL  Specific Gravity: 1.019  Protein, Urine: Negative mg/dL  Urobilinogen: 1.0 mg/dL  Nitrite: Negative  Leukocyte Esterase Concentration: NegativeTroponin I, High Sensitivity (24 @ 17:06)   Troponin I, High Sensitivity Result: 16.21:     RADIOLOGY;    < from: CT Abdomen and Pelvis w/ Oral Cont and w/ IV Cont (24 @ 18:36) >    IMPRESSION:  Redemonstration of a contained perforation of the first portion of the   duodenum with surrounding inflammatory changes, similar in appearance to   2024. No pneumoperitoneum or extraluminal oral contrast seen.    < end of copied text >    EKG    < from: 12 Lead ECG (24 @ 16:52) >    Diagnosis Line Unusual P axis, possible ectopic atrial rhythm with Premature atrial complexes in a pattern of bigeminy  Minimal voltage criteria for LVH, may be normal variant  Abnormal ECG  When compared with ECG of 15-MAY-2024 13:25,  No significant change was found    < end of copied text >    I personally reviewed labs, imaging, ekg, orders and vitals.    88F with PMH of recent admission for duodenal ulcer, breast ca, MDS, HTN, HLD and Hypothyroidism presents with increasing/worsening right abdominal pain. Evaluated by surgeon and advised to report to the ER and found to have duodenal perforation. On presentation reporting:    In the ER Vitals stable. CBC remarkable for Hgb 6.9->s/p transfusion with repeat Hgb 8.1, Plt 89, CMP unremarkable. Troponin negative. CT reporting:  < from: CT Abdomen and Pelvis w/ Oral Cont and w/ IV Cont (24 @ 18:36) >  IMPRESSION:  Redemonstration of a contained perforation of the first portion of the   duodenum with surrounding inflammatory changes, similar in appearance to   2024. No pneumoperitoneum or extraluminal oral contrast seen.    < end of copied text >"    Review of Systems: 14 Point review of systems reviewed and reported as negative unless otherwise stated above    Acute promyelocytic leukemia     Anemia     Cataract left eye    HLD (hyperlipidemia)     HTN (hypertension)     Hypothyroidism     Macular degeneration right.     PAST SURGICAL HISTORY:  Fracture of femur right ac right    H/O lumpectomy Left side in , performed by Dr. Samuels at .     FAMILY HISTORY:  Family history of cancer in brother, Colon cancer  Patient's father is , Hx of spine cancer  Patient's mother is , Hx of breast and bone cancer.    Social Hx: Denies tobacco/smoking, EtOH or illicit drug use      PHYSICAL EXAM:    T(C): 36.6 (24 @ 08:16), Max: 37.4 (24 @ 16:10)  HR: 76 (24 @ 08:16) (43 - 88)  BP: 139/60 (24 @ 08:16) (125/61 - 150/75)  RR: 18 (24 @ 08:16) (16 - 20)  SpO2: 91% (24 @ 08:16) (91% - 100%)    General: AAOx3; NAD  Head: AT/NC  ENT: Moist Mucous Membranes; No Injury  Eyes: EOMI; PERRL  Neck: Non-tender; No JVD  CVS: RRR, S1&S2, No murmur, No edema  Respiratory: Lungs CTA B/L; Normal Respiratory Effort  Abdomen/GI: Soft, non-tender, non-distended, no guarding, no rebound, normal bowel sounds  : No bladder distention, No Narayanan  Extremities: No cyanosis, No clubbing, No edema  MSK: No CVA tenderness, Normal ROM, No injury  Neuro: AAOx3, CNII-XII grossly intact, non-focal  Psych: Appropriate, Cooperative, No depression, No anxiety  Skin: Clean, Dry and Intact                          8.1    8.40  )-----------( 89       ( 22 May 2024 00:31 )             24.0       137  |  107  |  22  ----------------------------<  143<H>  4.3   |  24  |  0.82    Ca    7.9<L>      21 May 2024 17:06    TPro  6.0  /  Alb  2.3<L>  /  TBili  0.4  /  DBili  x   /  AST  22  /  ALT  40  /  AlkPhos  85        CAPILLARY BLOOD GLUCOSE        Urinalysis (24 @ 19:00)   pH Urine: 7.5  Glucose Qualitative, Urine: Negative mg/dL  Blood, Urine: Negative  Color: Yellow  Urine Appearance: Clear  Bilirubin: Negative  Ketone - Urine: Negative mg/dL  Specific Gravity: 1.019  Protein, Urine: Negative mg/dL  Urobilinogen: 1.0 mg/dL  Nitrite: Negative  Leukocyte Esterase Concentration: NegativeTroponin I, High Sensitivity (24 @ 17:06)   Troponin I, High Sensitivity Result: 16.21:     RADIOLOGY;    < from: CT Abdomen and Pelvis w/ Oral Cont and w/ IV Cont (24 @ 18:36) >    IMPRESSION:  Redemonstration of a contained perforation of the first portion of the   duodenum with surrounding inflammatory changes, similar in appearance to   2024. No pneumoperitoneum or extraluminal oral contrast seen.    < end of copied text >    EKG    < from: 12 Lead ECG (24 @ 16:52) >    Diagnosis Line Unusual P axis, possible ectopic atrial rhythm with Premature atrial complexes in a pattern of bigeminy  Minimal voltage criteria for LVH, may be normal variant  Abnormal ECG  When compared with ECG of 15-MAY-2024 13:25,  No significant change was found    < end of copied text >    I personally reviewed labs, imaging, ekg, orders and vitals.

## 2024-05-22 NOTE — H&P ADULT - NSHPPHYSICALEXAM_GEN_ALL_CORE
GEN: Denies fever, chills, sick contacts, recent travel  	HEENT: Denies dizziness, blurry vision, HA  	Cardiac: Denies CP, SOB, palpitations  	Lungs: Denies cough, wheezing  	PV: Denies LE swelling  	GI: diffuse +abdominal pain. Denies nausea, vomiting, diarrhea, constipation, flank pain  	: Denies hematuria, dysuria, frequency, urgency  	Skin: Denies rash, redness, open wound  	MSK: Denies pain, decreased ROM  Neuro: Denies dizziness, difficulty speaking, difficulty swallowing, numbness/tingling in extremities, loss of bowel/bladder control, weakness in extremities

## 2024-05-22 NOTE — CONSULT NOTE ADULT - SUBJECTIVE AND OBJECTIVE BOX
Patient is a 88y old  Female who presents with a chief complaint of abdominal pain    HPI:  89 y/o Female with h/o breast CA, MDS, recent admission for perforated duodenal ulcer, HTN, HLD, hypothyroidism was admitted on for recurrent right sided abdominal pain. Patient stated that she was was dc'd 5 days PTA following admission for perforated duodenal ulcer. Had 2 days of relief of symptoms, but they have been worsening since that time. The pt stated she had black stools until the day PTA. Was seen in office this AM by Dr. Brown, was advised to return to the hospital for likely readmission as per son as patient requires additional antibiotics which were nor prescribed at discharge. Pt denies fever, chills at home.       PMH: as above  PSH: as above  Meds: per reconciliation sheet, noted below  MEDICATIONS  (STANDING):  acetaminophen   IVPB .. 1000 milliGRAM(s) IV Intermittent once  aspirin  chewable 81 milliGRAM(s) Oral daily  atorvastatin 20 milliGRAM(s) Oral at bedtime  dextrose 5% + sodium chloride 0.45% with potassium chloride 20 mEq/L 1000 milliLiter(s) (100 mL/Hr) IV Continuous <Continuous>  escitalopram 10 milliGRAM(s) Oral daily  fluconAZOLE IVPB 400 milliGRAM(s) IV Intermittent every 24 hours  heparin   Injectable 5000 Unit(s) SubCutaneous every 12 hours  hydrochlorothiazide 12.5 milliGRAM(s) Oral daily  levothyroxine 50 MICROGram(s) Oral daily  losartan 100 milliGRAM(s) Oral daily  pantoprazole  Injectable 40 milliGRAM(s) IV Push every 12 hours  piperacillin/tazobactam IVPB.. 3.375 Gram(s) IV Intermittent every 8 hours    MEDICATIONS  (PRN):  acetaminophen     Tablet .. 650 milliGRAM(s) Oral every 6 hours PRN Moderate Pain (4 - 6)  HYDROmorphone  Injectable 0.5 milliGRAM(s) IV Push every 4 hours PRN Severe Pain (7 - 10)  morphine  - Injectable 2 milliGRAM(s) IV Push every 4 hours PRN Moderate Pain (4 - 6)  ondansetron Injectable 4 milliGRAM(s) IV Push every 6 hours PRN Nausea    Allergies    No Known Allergies    Intolerances      Social: no smoking, no alcohol, no illegal drugs; no recent travel, no exposure to TB  FAMILY HISTORY:  Patient's father is   Hx of spine cancer    Patient's mother is   Hx of breast and bone cancer    Family history of cancer in brother  Colon cancer      no history of premature cardiovascular disease in first degree relatives    ROS: the patient denies fever, no chills, no HA, no seizures, no dizziness, no sore throat, no nasal congestion, no blurry vision, no CP, no palpitations, no SOB, no cough, no abdominal pain, no diarrhea, no N/V, no dysuria, no leg pain, no claudication, no rash, no joint aches, no rectal pain or bleeding, no night sweats  All other systems reviewed and are negative    Vital Signs Last 24 Hrs  T(C): 36.6 (22 May 2024 08:16), Max: 37.4 (21 May 2024 16:10)  T(F): 97.9 (22 May 2024 08:16), Max: 99.3 (21 May 2024 16:10)  HR: 76 (22 May 2024 08:16) (43 - 88)  BP: 139/60 (22 May 2024 08:16) (125/61 - 150/75)  BP(mean): 85 (21 May 2024 22:45) (78 - 90)  RR: 18 (22 May 2024 08:16) (16 - 20)  SpO2: 91% (22 May 2024 08:16) (91% - 100%)    Parameters below as of 22 May 2024 08:16  Patient On (Oxygen Delivery Method): room air      Daily Height in cm: 147.32 (21 May 2024 15:46)    Daily     PE:    Constitutional:  No acute distress  HEENT: NC/AT, EOMI, PERRLA, conjunctivae clear; ears and nose atraumatic; pharynx benign  Neck: supple; thyroid not palpable  Back: no tenderness  Respiratory: respiratory effort normal; clear to auscultation  Cardiovascular: S1S2 regular, no murmurs  Abdomen: soft, not tender, not distended, positive BS; no liver or spleen organomegaly  Genitourinary: no suprapubic tenderness  Lymphatic: no LN palpable  Musculoskeletal: no muscle tenderness, no joint swelling or tenderness  Extremities: no pedal edema  Neurological/ Psychiatric: AxOx3, judgement and insight normal; moving all extremities  Skin: no rashes; no palpable lesions    Labs: all available labs reviewed                        8.1    8.40  )-----------( 89       ( 22 May 2024 00:31 )             24.0         137  |  107  |  22  ----------------------------<  143<H>  4.3   |  24  |  0.82    Ca    7.9<L>      21 May 2024 17:06    TPro  6.0  /  Alb  2.3<L>  /  TBili  0.4  /  DBili  x   /  AST  22  /  ALT  40  /  AlkPhos  85       LIVER FUNCTIONS - ( 21 May 2024 17:06 )  Alb: 2.3 g/dL / Pro: 6.0 gm/dL / ALK PHOS: 85 U/L / ALT: 40 U/L / AST: 22 U/L / GGT: x           Urinalysis ( @ 19:00)  Urine Appearance: Clear  Protein, Urine: Negative mg/dL                    Radiology: all available radiological tests reviewed    Advanced directives addressed: full resuscitation Patient is a 88y old  Female who presents with a chief complaint of abdominal pain    HPI:  89 y/o Female with h/o breast CA, MDS, recent admission for perforated duodenal ulcer, HTN, HLD, hypothyroidism was admitted on for recurrent right sided abdominal pain. Patient stated that she was was dc'd 5 days PTA following admission for perforated duodenal ulcer. Had 2 days of relief of symptoms, but they have been worsening since that time. The pt stated she had black stools until the day PTA. Was seen in office this AM by Dr. Brown, was advised to return to the hospital for likely readmission as per son as patient requires additional antibiotics which were nor prescribed at discharge. Pt denies fever, chills at home. She received zosyn and fluconazole.     PMH: as above  PSH: as above  Meds: per reconciliation sheet, noted below  MEDICATIONS  (STANDING):  acetaminophen   IVPB .. 1000 milliGRAM(s) IV Intermittent once  aspirin  chewable 81 milliGRAM(s) Oral daily  atorvastatin 20 milliGRAM(s) Oral at bedtime  dextrose 5% + sodium chloride 0.45% with potassium chloride 20 mEq/L 1000 milliLiter(s) (100 mL/Hr) IV Continuous <Continuous>  escitalopram 10 milliGRAM(s) Oral daily  fluconAZOLE IVPB 400 milliGRAM(s) IV Intermittent every 24 hours  heparin   Injectable 5000 Unit(s) SubCutaneous every 12 hours  hydrochlorothiazide 12.5 milliGRAM(s) Oral daily  levothyroxine 50 MICROGram(s) Oral daily  losartan 100 milliGRAM(s) Oral daily  pantoprazole  Injectable 40 milliGRAM(s) IV Push every 12 hours  piperacillin/tazobactam IVPB.. 3.375 Gram(s) IV Intermittent every 8 hours    MEDICATIONS  (PRN):  acetaminophen     Tablet .. 650 milliGRAM(s) Oral every 6 hours PRN Moderate Pain (4 - 6)  HYDROmorphone  Injectable 0.5 milliGRAM(s) IV Push every 4 hours PRN Severe Pain (7 - 10)  morphine  - Injectable 2 milliGRAM(s) IV Push every 4 hours PRN Moderate Pain (4 - 6)  ondansetron Injectable 4 milliGRAM(s) IV Push every 6 hours PRN Nausea    Allergies    No Known Allergies    Intolerances      Social: no smoking, no alcohol, no illegal drugs; no recent travel, no exposure to TB  FAMILY HISTORY:  Patient's father is   Hx of spine cancer  Patient's mother is   Hx of breast and bone cancer  Family history of cancer in brother  Colon cancer    ROS: the patient denies fever, no chills, no HA, no seizures, no dizziness, no sore throat, no nasal congestion, no blurry vision, no CP, no palpitations, no SOB, no cough, has abdominal pain, no diarrhea, no N/V, no dysuria, no leg pain, no claudication, no rash, no joint aches, no rectal pain or bleeding, no night sweats  All other systems reviewed and are negative    Vital Signs Last 24 Hrs  T(C): 36.6 (22 May 2024 08:16), Max: 37.4 (21 May 2024 16:10)  T(F): 97.9 (22 May 2024 08:16), Max: 99.3 (21 May 2024 16:10)  HR: 76 (22 May 2024 08:16) (43 - 88)  BP: 139/60 (22 May 2024 08:16) (125/61 - 150/75)  BP(mean): 85 (21 May 2024 22:45) (78 - 90)  RR: 18 (22 May 2024 08:16) (16 - 20)  SpO2: 91% (22 May 2024 08:16) (91% - 100%)    Parameters below as of 22 May 2024 08:16  Patient On (Oxygen Delivery Method): room air      Daily Height in cm: 147.32 (21 May 2024 15:46)    Daily     PE:    Constitutional:  No acute distress  HEENT: NC/AT, EOMI, PERRLA, conjunctivae clear; ears and nose atraumatic; pharynx benign  Neck: supple; thyroid not palpable  Back: no tenderness  Respiratory: respiratory effort normal; clear to auscultation  Cardiovascular: S1S2 regular, no murmurs  Abdomen: soft, epigastric tender, not distended, positive BS; no liver or spleen organomegaly  Genitourinary: no suprapubic tenderness  Lymphatic: no LN palpable  Musculoskeletal: no muscle tenderness, no joint swelling or tenderness  Extremities: no pedal edema  Neurological/ Psychiatric: AxOx3, judgement and insight normal; moving all extremities  Skin: no rashes; no palpable lesions    Labs: all available labs reviewed                        8.1    8.40  )-----------( 89       ( 22 May 2024 00:31 )             24.0         137  |  107  |  22  ----------------------------<  143<H>  4.3   |  24  |  0.82    Ca    7.9<L>      21 May 2024 17:06    TPro  6.0  /  Alb  2.3<L>  /  TBili  0.4  /  DBili  x   /  AST  22  /  ALT  40  /  AlkPhos  85       LIVER FUNCTIONS - ( 21 May 2024 17:06 )  Alb: 2.3 g/dL / Pro: 6.0 gm/dL / ALK PHOS: 85 U/L / ALT: 40 U/L / AST: 22 U/L / GGT: x           Urinalysis ( @ 19:00)  Urine Appearance: Clear  Protein, Urine: Negative mg/dL    Culture - Urine (collected 13 May 2024 14:13)  Source: Clean Catch Clean Catch (Midstream)  Final Report (15 May 2024 20:00):    50,000 - 99,000 CFU/mL Escherichia coli  Organism: Escherichia coli (15 May 2024 20:00)  Organism: Escherichia coli (15 May 2024 20:00)      Method Type: TEVIN      -  Amoxicillin/Clavulanic Acid: S <=8/4      -  Ampicillin: S <=8 These ampicillin results predict results for amoxicillin      -  Ampicillin/Sulbactam: S <=4/2      -  Aztreonam: S <=4      -  Cefazolin: S <=2 For uncomplicated UTI with K. pneumoniae, E. coli, or P. mirablis: TEVIN <=16 is sensitive and TEVIN >=32 is resistant. This also predicts results for oral agents cefaclor, cefdinir, cefpodoxime, cefprozil, cefuroxime axetil, cephalexin and locarbef for uncomplicated UTI. Note that some isolates may be susceptible to these agents while testing resistant to cefazolin.      -  Cefepime: S <=2      -  Cefoxitin: S <=8      -  Ceftriaxone: S <=1      -  Cefuroxime: S <=4      -  Ciprofloxacin: S <=0.25      -  Ertapenem: S <=0.5      -  Gentamicin: S <=2      -  Imipenem: S <=1      -  Levofloxacin: S <=0.5      -  Meropenem: S <=1      -  Nitrofurantoin: S <=32 Should not be used to treat pyelonephritis      -  Piperacillin/Tazobactam: S <=8      -  Tobramycin: S <=2      -  Trimethoprim/Sulfamethoxazole: S <=0.5/9.5    Radiology: all available radiological tests reviewed    < from: CT Abdomen and Pelvis w/ Oral Cont and w/ IV Cont (24 @ 18:36) >  Redemonstration of a contained perforation of the first portion of the duodenum with surrounding inflammatory changes, similar in appearance to   2024. No pneumoperitoneum or extraluminal oral contrast seen.  < end of copied text >    Advanced directives addressed: full resuscitation

## 2024-05-22 NOTE — CONSULT NOTE ADULT - ASSESSMENT
87 y/o Female with h/o breast CA, MDS, recent admission for perforated duodenal ulcer, HTN, HLD, hypothyroidism was admitted on for recurrent right sided abdominal pain. Patient stated that she was was dc'd 5 days PTA following admission for perforated duodenal ulcer. Had 2 days of relief of symptoms, but they have been worsening since that time. The pt stated she had black stools until the day PTA. Was seen in office this AM by Dr. Brown, was advised to return to the hospital for likely readmission as per son as patient requires additional antibiotics which were nor prescribed at discharge. Pt denies fever, chills at home. She received zosyn and fluconazole.     1. Perforated duodenal ulcer. Acute localized peritonitis with contained DU perforation.   -low grade fever  -partially treated with IV abx during recent hospitalization  -recurrent abdomen pain --> has a contained perforation of the first portion of the duodenum with persistent surrounding inflammatory changes  -on zosyn 3.375 gm IV q8h and fluconazole 400 mg IV qd  -surgery evaluation appreciated - conservative care at this time  -change abx to ertapenem 1000 mg IV qd and fluconazole 100 mg PO qd  -reason for abx use and side effects reviewed with patient; monitor BMP   -plan for home IV abx via midline  -old chart reviewed to assess prior cultures  -monitor temps  -f/u CBC  -supportive care  2. Other issues:   -care per medicine    IV to PO abx token dose not apply  
MEDICATIONS  (STANDING):  acetaminophen   IVPB .. 1000 milliGRAM(s) IV Intermittent once  aspirin  chewable 81 milliGRAM(s) Oral daily  atorvastatin 20 milliGRAM(s) Oral at bedtime  dextrose 5% + sodium chloride 0.45% with potassium chloride 20 mEq/L 1000 milliLiter(s) (100 mL/Hr) IV Continuous <Continuous>  ertapenem  IVPB 1000 milliGRAM(s) IV Intermittent every 24 hours  escitalopram 10 milliGRAM(s) Oral daily  fluconAZOLE   Tablet 100 milliGRAM(s) Oral daily  heparin   Injectable 5000 Unit(s) SubCutaneous every 12 hours  hydrochlorothiazide 12.5 milliGRAM(s) Oral daily  levothyroxine 50 MICROGram(s) Oral daily  losartan 100 milliGRAM(s) Oral daily  pantoprazole  Injectable 40 milliGRAM(s) IV Push every 12 hours    MEDICATIONS  (PRN):  acetaminophen     Tablet .. 650 milliGRAM(s) Oral every 6 hours PRN Moderate Pain (4 - 6)  HYDROmorphone  Injectable 0.5 milliGRAM(s) IV Push every 4 hours PRN Severe Pain (7 - 10)  morphine  - Injectable 2 milliGRAM(s) IV Push every 4 hours PRN Moderate Pain (4 - 6)  ondansetron Injectable 4 milliGRAM(s) IV Push every 6 hours PRN Nausea      A/P    Duodenal perforatoin  -Admitted to surgery  -Pain control/bowel regimen/management as per surgery  -PPI    MDS  Anemia  Thrombocytopenia  Chronic Myeloproliferative Disorder  -hgb on admission 6.9. s/p transfusion. Continue to monitor and transfuse accordingly  -Monitor plt. Hold DVT prophylaxis for plts less than 50K    Hypothyroidism  HTN  CAD without angina  GERD  -Home medications  -ASA/Statin    DVT Prophylaxis: heparin subq

## 2024-05-22 NOTE — H&P ADULT - ASSESSMENT
88 F w/ contained perforation of the first portion of the duodenum with surrounding inflammatory changes, similar in appearance to 5/13/2024. No pneumoperitoneum or extraluminal oral contrast seen    Plan:  NPO except meds  IVF  IV abx  Antifungals  Protonix BID  Ambulation   Hospitalist consult  PT  Pain control PRN  DVT ppx    Plan discussed with Dr. Brown

## 2024-05-23 ENCOUNTER — TRANSCRIPTION ENCOUNTER (OUTPATIENT)
Age: 89
End: 2024-05-23

## 2024-05-23 VITALS
OXYGEN SATURATION: 95 % | RESPIRATION RATE: 18 BRPM | SYSTOLIC BLOOD PRESSURE: 141 MMHG | TEMPERATURE: 99 F | HEART RATE: 78 BPM | DIASTOLIC BLOOD PRESSURE: 53 MMHG

## 2024-05-23 DIAGNOSIS — K63.1 PERFORATION OF INTESTINE (NONTRAUMATIC): ICD-10-CM

## 2024-05-23 DIAGNOSIS — K65.9 PERITONITIS, UNSPECIFIED: ICD-10-CM

## 2024-05-23 LAB
CULTURE RESULTS: SIGNIFICANT CHANGE UP
SPECIMEN SOURCE: SIGNIFICANT CHANGE UP

## 2024-05-23 PROCEDURE — 99233 SBSQ HOSP IP/OBS HIGH 50: CPT

## 2024-05-23 RX ADMIN — ERTAPENEM SODIUM 120 MILLIGRAM(S): 1 INJECTION, POWDER, LYOPHILIZED, FOR SOLUTION INTRAMUSCULAR; INTRAVENOUS at 11:55

## 2024-05-23 RX ADMIN — HYDROMORPHONE HYDROCHLORIDE 0.5 MILLIGRAM(S): 2 INJECTION INTRAMUSCULAR; INTRAVENOUS; SUBCUTANEOUS at 03:04

## 2024-05-23 RX ADMIN — Medication 50 MICROGRAM(S): at 06:10

## 2024-05-23 RX ADMIN — DEXTROSE MONOHYDRATE, SODIUM CHLORIDE, AND POTASSIUM CHLORIDE 50 MILLILITER(S): 50; .745; 4.5 INJECTION, SOLUTION INTRAVENOUS at 06:10

## 2024-05-23 RX ADMIN — PANTOPRAZOLE SODIUM 40 MILLIGRAM(S): 20 TABLET, DELAYED RELEASE ORAL at 11:40

## 2024-05-23 RX ADMIN — ESCITALOPRAM OXALATE 10 MILLIGRAM(S): 10 TABLET, FILM COATED ORAL at 11:40

## 2024-05-23 RX ADMIN — Medication 81 MILLIGRAM(S): at 11:38

## 2024-05-23 RX ADMIN — FLUCONAZOLE 100 MILLIGRAM(S): 150 TABLET ORAL at 11:40

## 2024-05-23 RX ADMIN — LOSARTAN POTASSIUM 100 MILLIGRAM(S): 100 TABLET, FILM COATED ORAL at 13:26

## 2024-05-23 RX ADMIN — HEPARIN SODIUM 5000 UNIT(S): 5000 INJECTION INTRAVENOUS; SUBCUTANEOUS at 11:40

## 2024-05-23 RX ADMIN — HYDROMORPHONE HYDROCHLORIDE 0.5 MILLIGRAM(S): 2 INJECTION INTRAMUSCULAR; INTRAVENOUS; SUBCUTANEOUS at 02:34

## 2024-05-23 NOTE — DISCHARGE NOTE NURSING/CASE MANAGEMENT/SOCIAL WORK - NSDCVIVACCINE_GEN_ALL_CORE_FT
influenza, injectable, quadrivalent, preservative free; 08-Jan-2020 16:16; Milana Shook (MARK); [employer]; [id number] (Exp. Date: 30-Jun-2020); IntraMuscular; Deltoid Right.; 0.5 milliLiter(s); VIS (VIS Published: 15-Aug-2019, VIS Presented: 08-Jan-2020);

## 2024-05-23 NOTE — ADVANCED PRACTICE NURSE CONSULT - ASSESSMENT
Procedure Note: Vascular Access  Procedure Name: RUE Midline Placement  Time out: Patient’s first and last name, , MRN, procedure and correct site confirmed prior to start of procedure.  Procedure Date and Time: 2024 [id number]  Informed Consent: Benefits, risks, and possible complications of procedure explained to patient/caregiver who verbalized understanding and gave verbal consent.  Local Anesthesia: 1% Lidocaine subdermal  Procedure findings and Details:  Successful placement of RUE single lumen Midline (BioFlo Midline Lot # [id number]) via brachial vein inserted under ultrasound guidance.  Midline line trimmed to 10cm.   Follow up: CXR not needed for placement verification.  OK to use.  Report given to District RN, Lori Marquez.

## 2024-05-23 NOTE — DISCHARGE NOTE NURSING/CASE MANAGEMENT/SOCIAL WORK - NSDCPEFALRISK_GEN_ALL_CORE
For information on Fall & Injury Prevention, visit: [email or web address] OR  [email or web address] OR  https://www.cdc.gov/steadi/patient.html

## 2024-05-23 NOTE — DISCHARGE NOTE NURSING/CASE MANAGEMENT/SOCIAL WORK - PATIENT PORTAL LINK FT
You can access the FollowMyHealth Patient Portal offered by [facility] by registering at the following website: [email or web address]. By joining [facility]’s FollowMyHealth portal, you will also be able to view your health information using other applications (apps) compatible with our system.

## 2024-05-23 NOTE — PROGRESS NOTE ADULT - PROBLEM SELECTOR PLAN 2
-Started on zosyn 3.375 gm IV q8h and fluconazole 400 mg IV qd  -continue on ertapenem 1000 mg IV qd will need 2 more weeks of abx therapy and fluconazole 100 mg PO qd

## 2024-05-23 NOTE — PROGRESS NOTE ADULT - SUBJECTIVE AND OBJECTIVE BOX
Date of service: 05-23-24 @ 08:03    Siting in bed in NAD  Has low grade fever  Has abdominal discomfort    ROS: denies dizziness, no HA, no SOB or cough, no diarrhea or constipation; no dysuria, no legs pain, no rashes    MEDICATIONS  (STANDING):  acetaminophen   IVPB .. 1000 milliGRAM(s) IV Intermittent once  aspirin  chewable 81 milliGRAM(s) Oral daily  atorvastatin 20 milliGRAM(s) Oral at bedtime  dextrose 5% + sodium chloride 0.45% with potassium chloride 20 mEq/L 1000 milliLiter(s) (50 mL/Hr) IV Continuous <Continuous>  ertapenem  IVPB 1000 milliGRAM(s) IV Intermittent every 24 hours  escitalopram 10 milliGRAM(s) Oral daily  fluconAZOLE   Tablet 100 milliGRAM(s) Oral daily  heparin   Injectable 5000 Unit(s) SubCutaneous every 12 hours  hydrochlorothiazide 12.5 milliGRAM(s) Oral daily  levothyroxine 50 MICROGram(s) Oral daily  losartan 100 milliGRAM(s) Oral daily  pantoprazole  Injectable 40 milliGRAM(s) IV Push every 12 hours    Vital Signs Last 24 Hrs  T(C): 37.2 (23 May 2024 07:38), Max: 37.5 (22 May 2024 19:54)  T(F): 99 (23 May 2024 07:38), Max: 99.5 (22 May 2024 19:54)  HR: 81 (23 May 2024 07:38) (73 - 81)  BP: 123/88 (23 May 2024 07:38) (123/88 - 142/64)  BP(mean): --  RR: 17 (23 May 2024 07:38) (17 - 18)  SpO2: 92% (23 May 2024 07:38) (91% - 93%)    Parameters below as of 23 May 2024 07:38  Patient On (Oxygen Delivery Method): room air    Physical exam:    Constitutional:  No acute distress  HEENT: NC/AT, EOMI, PERRLA, conjunctivae clear; ears and nose atraumatic  Neck: supple; thyroid not palpable  Back: no tenderness  Respiratory: respiratory effort normal; clear to auscultation  Cardiovascular: S1S2 regular, no murmurs  Abdomen: soft, epigastric tender, not distended, positive BS  Genitourinary: no suprapubic tenderness  Lymphatic: no LN palpable  Musculoskeletal: no muscle tenderness, no joint swelling or tenderness  Extremities: no pedal edema  Neurological/ Psychiatric: AxOx3, judgement and insight normal; moving all extremities  Skin: no rashes; no palpable lesions    Labs: reviewed                        8.9    6.81  )-----------( 114      ( 23 May 2024 06:24 )             27.1     05-23    137  |  107  |  14  ----------------------------<  157<H>  4.2   |  23  |  0.75    Ca    8.1<L>      23 May 2024 06:24  Phos  2.5     05-23  Mg     1.9     05-23    TPro  6.0  /  Alb  2.3<L>  /  TBili  0.5  /  DBili  x   /  AST  45<H>  /  ALT  62  /  AlkPhos  171<H>  05-23    Ferritin: 5757 ng/mL (05-15-24 @ 08:04)                        8.1    8.40  )-----------( 89       ( 22 May 2024 00:31 )             24.0     05-21    137  |  107  |  22  ----------------------------<  143<H>  4.3   |  24  |  0.82    Ca    7.9<L>      21 May 2024 17:06    TPro  6.0  /  Alb  2.3<L>  /  TBili  0.4  /  DBili  x   /  AST  22  /  ALT  40  /  AlkPhos  85  05-21     LIVER FUNCTIONS - ( 21 May 2024 17:06 )  Alb: 2.3 g/dL / Pro: 6.0 gm/dL / ALK PHOS: 85 U/L / ALT: 40 U/L / AST: 22 U/L / GGT: x           Urinalysis (05-21 @ 19:00)  Urine Appearance: Clear  Protein, Urine: Negative mg/dL    Culture - Urine (collected 13 May 2024 14:13)  Source: Clean Catch Clean Catch (Midstream)  Final Report (15 May 2024 20:00):    50,000 - 99,000 CFU/mL Escherichia coli  Organism: Escherichia coli (15 May 2024 20:00)  Organism: Escherichia coli (15 May 2024 20:00)      Method Type: TEVIN      -  Amoxicillin/Clavulanic Acid: S <=8/4      -  Ampicillin: S <=8 These ampicillin results predict results for amoxicillin      -  Ampicillin/Sulbactam: S <=4/2      -  Aztreonam: S <=4      -  Cefazolin: S <=2 For uncomplicated UTI with K. pneumoniae, E. coli, or P. mirablis: TEVIN <=16 is sensitive and TEVIN >=32 is resistant. This also predicts results for oral agents cefaclor, cefdinir, cefpodoxime, cefprozil, cefuroxime axetil, cephalexin and locarbef for uncomplicated UTI. Note that some isolates may be susceptible to these agents while testing resistant to cefazolin.      -  Cefepime: S <=2      -  Cefoxitin: S <=8      -  Ceftriaxone: S <=1      -  Cefuroxime: S <=4      -  Ciprofloxacin: S <=0.25      -  Ertapenem: S <=0.5      -  Gentamicin: S <=2      -  Imipenem: S <=1      -  Levofloxacin: S <=0.5      -  Meropenem: S <=1      -  Nitrofurantoin: S <=32 Should not be used to treat pyelonephritis      -  Piperacillin/Tazobactam: S <=8      -  Tobramycin: S <=2      -  Trimethoprim/Sulfamethoxazole: S <=0.5/9.5    Radiology: all available radiological tests reviewed    < from: CT Abdomen and Pelvis w/ Oral Cont and w/ IV Cont (05.21.24 @ 18:36) >  Redemonstration of a contained perforation of the first portion of the duodenum with surrounding inflammatory changes, similar in appearance to   5/13/2024. No pneumoperitoneum or extraluminal oral contrast seen.  < end of copied text >    Advanced directives addressed: full resuscitation

## 2024-05-23 NOTE — PROGRESS NOTE ADULT - ASSESSMENT
89 y/o Female with h/o breast CA, MDS, recent admission for perforated duodenal ulcer, HTN, HLD, hypothyroidism was admitted on for recurrent right sided abdominal pain. Patient stated that she was was dc'd 5 days PTA following admission for perforated duodenal ulcer. Had 2 days of relief of symptoms, but they have been worsening since that time. The pt stated she had black stools until the day PTA. Was seen in office this AM by Dr. Brown, was advised to return to the hospital for likely readmission as per son as patient requires additional antibiotics which were nor prescribed at discharge. Pt denies fever, chills at home. She received zosyn and fluconazole.     1. Perforated duodenal ulcer. Acute localized peritonitis with contained DU perforation.   -low grade fever  -partially treated with IV abx during recent hospitalization  -recurrent abdomen pain --> has a contained perforation of the first portion of the duodenum with persistent surrounding inflammatory changes  -s/p zosyn 3.375 gm IV q8h and fluconazole 400 mg IV qd  -surgery evaluation appreciated - conservative care at this time  -on ertapenem 1000 mg IV qd and fluconazole 100 mg PO qd # 1-2  -tolerating abx well so far; no side effects noted  -continue abx coverage   -plan for home IV abx via midline  -expect that patient will need 2 more weeks of abx therapy, but duration of abx therapy will depend on her clinical response  -f/u with surgery  -monitor temps  -f/u CBC  -supportive care  2. Other issues:   -care per medicine    IV to PO abx token dose not apply  
88 F w/ contained perforation of the first portion of the duodenum with surrounding inflammatory changes, similar in appearance to 5/13/2024. No pneumoperitoneum or extraluminal oral contrast seen    Plan:  FLD, ADAT  IV abx as per ID  Obtain d/c reccs  Protonix BID  Ambulation   Hospitalist consult appreciated  Physical therapy  Pain control PRN  DVT ppx    Plan discussed with Dr. Brown 
88 F w/ contained perforation of the first portion of the duodenum with surrounding inflammatory changes, similar in appearance to 5/13/2024. No pneumoperitoneum or extraluminal oral contrast seen    Disposition: -plan for home IV abx via midline

## 2024-05-23 NOTE — PROGRESS NOTE ADULT - SUBJECTIVE AND OBJECTIVE BOX
[facility]   SURGERY DAILY PROGRESS NOTE    Subjective:  Patient seen and examined on morning rounds.       MEDICATIONS  (STANDING):  acetaminophen   IVPB .. 1000 milliGRAM(s) IV Intermittent once  aspirin  chewable 81 milliGRAM(s) Oral daily  atorvastatin 20 milliGRAM(s) Oral at bedtime  dextrose 5% + sodium chloride 0.45% with potassium chloride 20 mEq/L 1000 milliLiter(s) (50 mL/Hr) IV Continuous <Continuous>  ertapenem  IVPB 1000 milliGRAM(s) IV Intermittent every 24 hours  escitalopram 10 milliGRAM(s) Oral daily  fluconAZOLE   Tablet 100 milliGRAM(s) Oral daily  heparin   Injectable 5000 Unit(s) SubCutaneous every 12 hours  hydrochlorothiazide 12.5 milliGRAM(s) Oral daily  levothyroxine 50 MICROGram(s) Oral daily  losartan 100 milliGRAM(s) Oral daily  pantoprazole  Injectable 40 milliGRAM(s) IV Push every 12 hours    MEDICATIONS  (PRN):  acetaminophen     Tablet .. 650 milliGRAM(s) Oral every 6 hours PRN Moderate Pain (4 - 6)  HYDROmorphone  Injectable 0.5 milliGRAM(s) IV Push every 4 hours PRN Severe Pain (7 - 10)  morphine  - Injectable 2 milliGRAM(s) IV Push every 4 hours PRN Moderate Pain (4 - 6)  ondansetron Injectable 4 milliGRAM(s) IV Push every 6 hours PRN Nausea    Vital Signs Last 24 Hrs  T(C): 37 (22 May 2024 20:42), Max: 37.5 (22 May 2024 19:54)  T(F): 98.6 (22 May 2024 20:42), Max: 99.5 (22 May 2024 19:54)  HR: 73 (22 May 2024 19:54) (73 - 83)  BP: 142/61 (22 May 2024 19:54) (139/60 - 143/65)  BP(mean): --  RR: 18 (22 May 2024 19:54) (18 - 18)  SpO2: 93% (22 May 2024 19:54) (91% - 94%)    Parameters below as of 22 May 2024 19:54  Patient On (Oxygen Delivery Method): room air      I&O's Detail    21 May 2024 07:01  -  22 May 2024 07:00  --------------------------------------------------------  IN:    PRBCs (Packed Red Blood Cells): 335 mL  Total IN: 335 mL    OUT:  Total OUT: 0 mL    Total NET: 335 mL        Daily     Daily     LABS:                        8.1    8.40  )-----------( 89       ( 22 May 2024 00:31 )             24.0         137  |  107  |  22  ----------------------------<  143<H>  4.3   |  24  |  0.82    Ca    7.9<L>      21 May 2024 17:06    TPro  6.0  /  Alb  2.3<L>  /  TBili  0.4  /  DBili  x   /  AST  22  /  ALT  40  /  AlkPhos  85      PT/INR - ( 21 May 2024 17:06 )   PT: 13.9 sec;   INR: 1.24 ratio         PTT - ( 21 May 2024 17:06 )  PTT:35.2 sec  Urinalysis Basic - ( 21 May 2024 19:00 )    Color: Yellow / Appearance: Clear / S.019 / pH: x  Gluc: x / Ketone: Negative mg/dL  / Bili: Negative / Urobili: 1.0 mg/dL   Blood: x / Protein: Negative mg/dL / Nitrite: Negative   Leuk Esterase: Negative / RBC: x / WBC x   Sq Epi: x / Non Sq Epi: x / Bacteria: x        Physical Exam:   GEN: Denies fever, chills, sick contacts, recent travel  HEENT: Denies dizziness, blurry vision, HA  Cardiac: Denies CP, SOB, palpitations  Lungs: Denies cough, wheezing  PV: Denies LE swelling  GI: mild RUQ abdominal pain. Denies nausea, vomiting, diarrhea, constipation, flank pain  : Denies hematuria, dysuria, frequency, urgency  Skin: Denies rash, redness, open wound

## 2024-05-23 NOTE — PROGRESS NOTE ADULT - SUBJECTIVE AND OBJECTIVE BOX
< from: CT Abdomen and Pelvis w/ Oral Cont and w/ IV Cont (05.21.24 @ 18:36) >  IMPRESSION:  Redemonstration of a contained perforation of the first portion of the   duodenum with surrounding inflammatory changes, similar in appearance to   5/13/2024. No pneumoperitoneum or extraluminal oral contrast seen.    < end of copied text >      Culture - Blood (05.21.24 @ 17:06)   Specimen Source: .Blood None  Culture Results:   No growth at 24 hoursCulture - Urine (05.13.24 @ 14:13)   - Nitrofurantoin: S <=32 Should not be used to treat pyelonephritis  - Piperacillin/Tazobactam: S <=8  - Amoxicillin/Clavulanic Acid: S <=8/4  - Tobramycin: S <=2  - Trimethoprim/Sulfamethoxazole: S <=0.5/9.5  - Ceftriaxone: S <=1  - Cefuroxime: S <=4  - Ciprofloxacin: S <=0.25  - Ertapenem: S <=0.5  - Gentamicin: S <=2  - Imipenem: S <=1  - Levofloxacin: S <=0.5  - Meropenem: S <=1  - Ampicillin: S <=8 These ampicillin results predict results for amoxicillin  - Ampicillin/Sulbactam: S <=4/2  - Aztreonam: S <=4  - Cefazolin: S <=2 For uncomplicated UTI with K. pneumoniae, E. coli, or P. mirablis: TEVIN <=16 is sensitive and TEVIN >=32 is resistant. This also predicts results for oral agents cefaclor, cefdinir, cefpodoxime, cefprozil, cefuroxime axetil, cephalexin and locarbef for uncomplicated UTI. Note that some isolates may be susceptible to these agents while testing resistant to cefazolin.  - Cefepime: S <=2  - Cefoxitin: S <=8  Specimen Source: Clean Catch Clean Catch (Midstream)  Culture Results:   50,000 - 99,000 CFU/mL Escherichia coli  Organism Identification: Escherichia coli  Organism: Escherichia coli  Method Type: TEVIN HOSPITALIST ATTENDING PROGRESS NOTE    Chart and meds reviewed.  Patient seen and examined.    CC/ HPI Patient is a 88y old  Female who presents with a chief complaint of Abdominal Pain/Duodenal Perforation (22 May 2024 13:51)      Subjective:  Seen sitting in chair at bedside. States abdominal pain is improved. No nausea. no shortness of breath.     All other systems reviewed and found to be negative with the exception of what has been described above.    MEDICATIONS:  MEDICATIONS  (STANDING):  acetaminophen   IVPB .. 1000 milliGRAM(s) IV Intermittent once  aspirin  chewable 81 milliGRAM(s) Oral daily  atorvastatin 20 milliGRAM(s) Oral at bedtime  dextrose 5% + sodium chloride 0.45% with potassium chloride 20 mEq/L 1000 milliLiter(s) (50 mL/Hr) IV Continuous <Continuous>  ertapenem  IVPB 1000 milliGRAM(s) IV Intermittent every 24 hours  escitalopram 10 milliGRAM(s) Oral daily  fluconAZOLE   Tablet 100 milliGRAM(s) Oral daily  heparin   Injectable 5000 Unit(s) SubCutaneous every 12 hours  hydrochlorothiazide 12.5 milliGRAM(s) Oral daily  levothyroxine 50 MICROGram(s) Oral daily  losartan 100 milliGRAM(s) Oral daily  pantoprazole  Injectable 40 milliGRAM(s) IV Push every 12 hours      Vital Signs Last 24 Hrs  T(C): 37 (23 May 2024 16:06), Max: 37.5 (22 May 2024 19:54)  T(F): 98.6 (23 May 2024 16:06), Max: 99.5 (22 May 2024 19:54)  HR: 78 (23 May 2024 16:06) (73 - 81)  BP: 141/53 (23 May 2024 16:06) (123/88 - 142/61)  BP(mean): --  RR: 18 (23 May 2024 16:06) (17 - 18)  SpO2: 95% (23 May 2024 16:06) (92% - 95%)    Parameters below as of 23 May 2024 16:06  Patient On (Oxygen Delivery Method): room air        I&O's Summary      CAPILLARY BLOOD GLUCOSE          PHYSICAL EXAM:    Constitutional: NAD, awake and alert, thin  HEENT:  EOMI, Normal Hearing, MMM  Neck: Soft and supple, No LAD, No JVD  Respiratory: Breath sounds are clear bilaterally, No wheezing, rales or rhonchi  Cardiovascular: S1 and S2, regular rate and rhythm, no Murmurs, gallops or rubs  Gastrointestinal: Bowel Sounds present, soft, + mild tenderness over right epigastric region  Extremities: No peripheral edema  Vascular: 2+ peripheral pulses  Neurological: A/O x 3, no focal deficits  Musculoskeletal: 5/5 strength b/l upper and lower extremities  Skin: No rashes        LABS: All Labs Reviewed:                        8.9    6.81  )-----------( 114      ( 23 May 2024 06:24 )             27.1     05-23    137  |  107  |  14  ----------------------------<  157<H>  4.2   |  23  |  0.75    Ca    8.1<L>      23 May 2024 06:24  Phos  2.5     05-23  Mg     1.9     05-23    TPro  6.0  /  Alb  2.3<L>  /  TBili  0.5  /  DBili  x   /  AST  45<H>  /  ALT  62  /  AlkPhos  171<H>  05-23    PT/INR - ( 21 May 2024 17:06 )   PT: 13.9 sec;   INR: 1.24 ratio         PTT - ( 21 May 2024 17:06 )  PTT:35.2 sec      Blood Culture: 05-21 @ 19:00  Organism --  Gram Stain Blood -- Gram Stain --  Specimen Source Clean Catch Clean Catch (Midstream)  Culture-Blood --    05-21 @ 17:06  Organism --  Gram Stain Blood -- Gram Stain --  Specimen Source .Blood None  Culture-Blood --        RADIOLOGY/EKG:    DVT PPX:    ADVANCED DIRECTIVE:    DISPOSITION:    < from: CT Abdomen and Pelvis w/ Oral Cont and w/ IV Cont (05.21.24 @ 18:36) >  IMPRESSION:  Redemonstration of a contained perforation of the first portion of the   duodenum with surrounding inflammatory changes, similar in appearance to   5/13/2024. No pneumoperitoneum or extraluminal oral contrast seen.    < end of copied text >      Culture - Blood (05.21.24 @ 17:06)   Specimen Source: .Blood None  Culture Results:   No growth at 24 hoursCulture - Urine (05.13.24 @ 14:13)   - Nitrofurantoin: S <=32 Should not be used to treat pyelonephritis  - Piperacillin/Tazobactam: S <=8  - Amoxicillin/Clavulanic Acid: S <=8/4  - Tobramycin: S <=2  - Trimethoprim/Sulfamethoxazole: S <=0.5/9.5  - Ceftriaxone: S <=1  - Cefuroxime: S <=4  - Ciprofloxacin: S <=0.25  - Ertapenem: S <=0.5  - Gentamicin: S <=2  - Imipenem: S <=1  - Levofloxacin: S <=0.5  - Meropenem: S <=1  - Ampicillin: S <=8 These ampicillin results predict results for amoxicillin  - Ampicillin/Sulbactam: S <=4/2  - Aztreonam: S <=4  - Cefazolin: S <=2 For uncomplicated UTI with K. pneumoniae, E. coli, or P. mirablis: TEVIN <=16 is sensitive and TEVIN >=32 is resistant. This also predicts results for oral agents cefaclor, cefdinir, cefpodoxime, cefprozil, cefuroxime axetil, cephalexin and locarbef for uncomplicated UTI. Note that some isolates may be susceptible to these agents while testing resistant to cefazolin.  - Cefepime: S <=2  - Cefoxitin: S <=8  Specimen Source: Clean Catch Clean Catch (Midstream)  Culture Results:   50,000 - 99,000 CFU/mL Escherichia coli  Organism Identification: Escherichia coli  Organism: Escherichia coli  Method Type: TEVIN

## 2024-05-23 NOTE — DISCHARGE NOTE PROVIDER - NSDCQMPCI_CARD_ALL_CORE
Pt to ED for chest palpitations onset 1 month ago, intermittent. Today she states the palpitations started, she felt difficulty breathing, and her left arm began to hurt- currently 6/10. Denies chest pain. No

## 2024-05-23 NOTE — DISCHARGE NOTE PROVIDER - CARE PROVIDER_API CALL
Ed Brown  Surgery  [address] [place] [address], [address]  [place] [address]  Phone: [phone]  Fax: [phone]  Follow Up Time: 2 weeks

## 2024-05-23 NOTE — DISCHARGE NOTE PROVIDER - HOSPITAL COURSE
89yo F presented to the ED on 5/21/24 with worsening abd pain after being discharged on 5/16/24. Patient was previously admitted with contained duodenal perforation and treated conservatively. During admission, he pain improved and she tolerated a regular diet. PICC line was placed for outpatient IV abx administration. patient is stable for discharge.

## 2024-05-24 DIAGNOSIS — K26.5 CHRONIC OR UNSPECIFIED DUODENAL ULCER WITH PERFORATION: ICD-10-CM

## 2024-05-24 DIAGNOSIS — D46.9 MYELODYSPLASTIC SYNDROME, UNSPECIFIED: ICD-10-CM

## 2024-05-24 DIAGNOSIS — I10 ESSENTIAL (PRIMARY) HYPERTENSION: ICD-10-CM

## 2024-05-24 DIAGNOSIS — Z79.890 HORMONE REPLACEMENT THERAPY: ICD-10-CM

## 2024-05-24 DIAGNOSIS — E78.5 HYPERLIPIDEMIA, UNSPECIFIED: ICD-10-CM

## 2024-05-24 DIAGNOSIS — Z79.82 LONG TERM (CURRENT) USE OF ASPIRIN: ICD-10-CM

## 2024-05-24 DIAGNOSIS — E03.9 HYPOTHYROIDISM, UNSPECIFIED: ICD-10-CM

## 2024-05-24 DIAGNOSIS — E43 UNSPECIFIED SEVERE PROTEIN-CALORIE MALNUTRITION: ICD-10-CM

## 2024-05-28 ENCOUNTER — OUTPATIENT (OUTPATIENT)
Dept: OUTPATIENT SERVICES | Facility: HOSPITAL | Age: 89
LOS: 1 days | Discharge: HOME | End: 2024-05-28

## 2024-05-28 DIAGNOSIS — S72.90XA UNSPECIFIED FRACTURE OF UNSPECIFIED FEMUR, INITIAL ENCOUNTER FOR CLOSED FRACTURE: Chronic | ICD-10-CM

## 2024-05-28 DIAGNOSIS — Z98.890 OTHER SPECIFIED POSTPROCEDURAL STATES: Chronic | ICD-10-CM

## 2024-05-28 DIAGNOSIS — D64.9 ANEMIA, UNSPECIFIED: ICD-10-CM

## 2024-05-28 LAB — BLD GP AB SCN SERPL QL: SIGNIFICANT CHANGE UP

## 2024-05-28 PROCEDURE — 86901 BLOOD TYPING SEROLOGIC RH(D): CPT

## 2024-05-28 PROCEDURE — 86920 COMPATIBILITY TEST SPIN: CPT

## 2024-05-28 PROCEDURE — 86900 BLOOD TYPING SEROLOGIC ABO: CPT

## 2024-05-28 PROCEDURE — 86850 RBC ANTIBODY SCREEN: CPT

## 2024-05-28 PROCEDURE — 36415 COLL VENOUS BLD VENIPUNCTURE: CPT

## 2024-05-28 PROCEDURE — 86922 COMPATIBILITY TEST ANTIGLOB: CPT

## 2024-05-28 PROCEDURE — 86921 COMPATIBILITY TEST INCUBATE: CPT

## 2024-05-29 DIAGNOSIS — D64.9 ANEMIA, UNSPECIFIED: ICD-10-CM

## 2024-06-04 DIAGNOSIS — D46.9 MYELODYSPLASTIC SYNDROME, UNSPECIFIED: ICD-10-CM

## 2024-06-04 DIAGNOSIS — E03.9 HYPOTHYROIDISM, UNSPECIFIED: ICD-10-CM

## 2024-06-04 DIAGNOSIS — Z85.6 PERSONAL HISTORY OF LEUKEMIA: ICD-10-CM

## 2024-06-04 DIAGNOSIS — I10 ESSENTIAL (PRIMARY) HYPERTENSION: ICD-10-CM

## 2024-06-04 DIAGNOSIS — K26.1 ACUTE DUODENAL ULCER WITH PERFORATION: ICD-10-CM

## 2024-06-04 DIAGNOSIS — D47.1 CHRONIC MYELOPROLIFERATIVE DISEASE: ICD-10-CM

## 2024-06-04 DIAGNOSIS — K21.9 GASTRO-ESOPHAGEAL REFLUX DISEASE WITHOUT ESOPHAGITIS: ICD-10-CM

## 2024-06-04 DIAGNOSIS — E78.5 HYPERLIPIDEMIA, UNSPECIFIED: ICD-10-CM

## 2024-06-04 DIAGNOSIS — Z79.890 HORMONE REPLACEMENT THERAPY: ICD-10-CM

## 2024-06-04 DIAGNOSIS — Z79.82 LONG TERM (CURRENT) USE OF ASPIRIN: ICD-10-CM

## 2024-06-04 DIAGNOSIS — I25.10 ATHEROSCLEROTIC HEART DISEASE OF NATIVE CORONARY ARTERY WITHOUT ANGINA PECTORIS: ICD-10-CM

## 2024-06-04 DIAGNOSIS — D69.6 THROMBOCYTOPENIA, UNSPECIFIED: ICD-10-CM

## 2024-06-04 DIAGNOSIS — Z85.3 PERSONAL HISTORY OF MALIGNANT NEOPLASM OF BREAST: ICD-10-CM

## 2024-06-04 DIAGNOSIS — K65.0 GENERALIZED (ACUTE) PERITONITIS: ICD-10-CM

## 2025-03-24 NOTE — PHYSICAL THERAPY INITIAL EVALUATION ADULT - GENERAL OBSERVATIONS, REHAB EVAL
Mom left message after hours on Friday to review Thais's download because Thais feels she keeps \"running out of insulin\" and going low overnight after making dose changes at last visit. OP5 download reviewed. Thais is not entering any carbs and will randomly enter in corrections, which most of the time are blind bolus'. Mom was not aware of this. Mom reports pt picked up extra shifts so is working more, trying to get sleep schedule on track, etc. So she will work/talk with Thais to get her back on track with entering doses.          
Pt received supine in bed on 2N, +SCD's, R hip bandage C/D/I, denies pain at this session. Pt [place] and has L eye cataract and R eye macular degeneration, [place], A and O x4, reports just starting chemo and has a wig+